# Patient Record
Sex: FEMALE | Race: WHITE | Employment: FULL TIME | URBAN - METROPOLITAN AREA
[De-identification: names, ages, dates, MRNs, and addresses within clinical notes are randomized per-mention and may not be internally consistent; named-entity substitution may affect disease eponyms.]

---

## 2018-07-27 ENCOUNTER — OFFICE VISIT (OUTPATIENT)
Dept: FAMILY MEDICINE CLINIC | Age: 53
End: 2018-07-27
Payer: MEDICARE

## 2018-07-27 VITALS
HEIGHT: 66 IN | SYSTOLIC BLOOD PRESSURE: 132 MMHG | WEIGHT: 158 LBS | TEMPERATURE: 97.7 F | DIASTOLIC BLOOD PRESSURE: 72 MMHG | HEART RATE: 63 BPM | BODY MASS INDEX: 25.39 KG/M2

## 2018-07-27 DIAGNOSIS — J30.89 CHRONIC NON-SEASONAL ALLERGIC RHINITIS, UNSPECIFIED TRIGGER: ICD-10-CM

## 2018-07-27 DIAGNOSIS — Z12.39 BREAST CANCER SCREENING: ICD-10-CM

## 2018-07-27 DIAGNOSIS — Z12.11 SCREENING FOR COLON CANCER: ICD-10-CM

## 2018-07-27 DIAGNOSIS — E04.9 THYROID ENLARGEMENT: ICD-10-CM

## 2018-07-27 DIAGNOSIS — Z11.59 NEED FOR HEPATITIS C SCREENING TEST: ICD-10-CM

## 2018-07-27 DIAGNOSIS — Z97.2 WEARS DENTURES: ICD-10-CM

## 2018-07-27 DIAGNOSIS — Z76.89 ENCOUNTER TO ESTABLISH CARE: ICD-10-CM

## 2018-07-27 DIAGNOSIS — Z80.3 FAMILY HISTORY OF BREAST CANCER IN SISTER: ICD-10-CM

## 2018-07-27 DIAGNOSIS — Z01.89 ROUTINE LAB DRAW: ICD-10-CM

## 2018-07-27 DIAGNOSIS — R53.82 CHRONIC FATIGUE: ICD-10-CM

## 2018-07-27 DIAGNOSIS — L30.9 DERMATITIS: ICD-10-CM

## 2018-07-27 DIAGNOSIS — Z13.220 SCREENING, LIPID: ICD-10-CM

## 2018-07-27 DIAGNOSIS — R23.3 ABNORMAL BRUISING: Primary | ICD-10-CM

## 2018-07-27 DIAGNOSIS — Z11.4 ENCOUNTER FOR SCREENING FOR HIV: ICD-10-CM

## 2018-07-27 DIAGNOSIS — Z13.6 ENCOUNTER FOR SCREENING FOR CARDIOVASCULAR DISORDERS: ICD-10-CM

## 2018-07-27 DIAGNOSIS — Z86.39 HISTORY OF HYPERTHYROIDISM: ICD-10-CM

## 2018-07-27 PROCEDURE — 99203 OFFICE O/P NEW LOW 30 MIN: CPT | Performed by: NURSE PRACTITIONER

## 2018-07-27 PROCEDURE — 3017F COLORECTAL CA SCREEN DOC REV: CPT | Performed by: NURSE PRACTITIONER

## 2018-07-27 PROCEDURE — G8427 DOCREV CUR MEDS BY ELIG CLIN: HCPCS | Performed by: NURSE PRACTITIONER

## 2018-07-27 PROCEDURE — 1036F TOBACCO NON-USER: CPT | Performed by: NURSE PRACTITIONER

## 2018-07-27 PROCEDURE — G8419 CALC BMI OUT NRM PARAM NOF/U: HCPCS | Performed by: NURSE PRACTITIONER

## 2018-07-27 RX ORDER — LORATADINE 10 MG/1
10 TABLET ORAL DAILY
Qty: 30 TABLET | Refills: 0 | COMMUNITY
Start: 2018-07-27 | End: 2018-08-21 | Stop reason: ALTCHOICE

## 2018-07-27 RX ORDER — IBUPROFEN 800 MG/1
800 TABLET ORAL EVERY 8 HOURS PRN
COMMUNITY
Start: 2018-06-11 | End: 2018-08-21 | Stop reason: ALTCHOICE

## 2018-07-27 RX ORDER — ANTIARTHRITIC COMBINATION NO.2 900 MG
1 TABLET ORAL DAILY
COMMUNITY
End: 2018-08-21

## 2018-07-27 RX ORDER — LEVOTHYROXINE SODIUM 0.05 MG/1
50 TABLET ORAL DAILY
COMMUNITY
Start: 2018-07-15 | End: 2018-08-02 | Stop reason: ALTCHOICE

## 2018-07-27 ASSESSMENT — ENCOUNTER SYMPTOMS
COUGH: 0
EYE PAIN: 0
EYE REDNESS: 0
EYE DISCHARGE: 1
SINUS PRESSURE: 0
ABDOMINAL PAIN: 0
TROUBLE SWALLOWING: 0
VOMITING: 0
DIARRHEA: 0
CONSTIPATION: 0
BLOOD IN STOOL: 0
BACK PAIN: 0
SORE THROAT: 0
RHINORRHEA: 1
NAUSEA: 0
CHEST TIGHTNESS: 1
SHORTNESS OF BREATH: 0
ABDOMINAL DISTENTION: 0
WHEEZING: 0

## 2018-07-27 ASSESSMENT — PATIENT HEALTH QUESTIONNAIRE - PHQ9
2. FEELING DOWN, DEPRESSED OR HOPELESS: 0
SUM OF ALL RESPONSES TO PHQ QUESTIONS 1-9: 0
1. LITTLE INTEREST OR PLEASURE IN DOING THINGS: 0
SUM OF ALL RESPONSES TO PHQ9 QUESTIONS 1 & 2: 0

## 2018-07-27 NOTE — PROGRESS NOTES
hepatosplenomegaly. There is no tenderness. There is no rebound and no CVA tenderness. Musculoskeletal: Normal range of motion. She exhibits no edema or tenderness. Lymphadenopathy:     She has no cervical adenopathy. Neurological: She is alert and oriented to person, place, and time. She has normal strength. She exhibits normal muscle tone. Coordination normal.   Skin: Skin is warm, dry and intact. Ecchymosis (large bruises scattered over body wihtout explanation ) and rash noted. No erythema. Psychiatric: Her speech is normal and behavior is normal. Thought content normal. Her mood appears anxious. Nursing note and vitals reviewed. Assessment:      Diagnosis Orders   1. Abnormal bruising  Comprehensive Metabolic Panel    CBC Auto Differential    TSH without Reflex    T4, Free    T3, Free    APTT    Protime-INR    Vitamin B12    Vitamin D 25 Hydroxy   2. Dermatitis  Comprehensive Metabolic Panel    CBC Auto Differential    TSH without Reflex    T4, Free    T3, Free    APTT    Protime-INR    Vitamin B12    Vitamin D 25 Hydroxy   3. Chronic fatigue  Comprehensive Metabolic Panel    CBC Auto Differential    TSH without Reflex    T4, Free    T3, Free    US Head Neck Soft Tissue Thyroid    Vitamin B12    Vitamin D 25 Hydroxy   4. Thyroid enlargement  TSH without Reflex    T4, Free    T3, Free    US Head Neck Soft Tissue Thyroid   5. History of hyperthyroidism  TSH without Reflex    T4, Free    T3, Free    US Head Neck Soft Tissue Thyroid   6. Chronic non-seasonal allergic rhinitis, unspecified trigger  loratadine (CLARITIN) 10 MG tablet   7. Wears dentures     8. Screening for colon cancer  POCT Fecal Immunochemical Test (FIT)   9. Breast cancer screening  [facility] DIGITAL SCREEN W CAD BILATERAL   10. Family history of breast cancer in sister  [facility] DIGITAL SCREEN W CAD BILATERAL   11.  Encounter for screening for cardiovascular disorders  Lipid Panel    Comprehensive Metabolic Panel    CBC Auto Differential 12. Screening, lipid  Lipid Panel    Comprehensive Metabolic Panel    CBC Auto Differential   13. Routine lab draw  Lipid Panel    Comprehensive Metabolic Panel    CBC Auto Differential   14. Encounter for screening for HIV  Comprehensive Metabolic Panel    CBC Auto Differential    TSH without Reflex    T4, Free    T3, Free    US Head Neck Soft Tissue Thyroid    HIV Screen   15. Need for hepatitis C screening test  Hepatitis C Antibody   16. Encounter to establish care         Plan:     Care established in office today  tdap from [address] with FIT test for colon cancer screening   Proceed with mammogram as ordered  Fasting labs today as ordered  Ultrasound thyroid as ordered  Recommend healthy diet and regular exercise   Call with concerns  Return in about 4 weeks (around 8/24/2018) for physical with pap. Abbey Duque received counseling on the following healthy behaviors: nutrition, exercise and medication adherence  Reviewed prior labs and health maintenance  Continue current medications, diet and exercise. Discussed use, benefit, and side effects of prescribed medications. Barriers to medication compliance addressed. Patient given educational materials - see patient instructions  Was a self-tracking handout given in paper form or via "[employer]t? No    Requested Prescriptions     Signed Prescriptions Disp Refills    loratadine (CLARITIN) 10 MG tablet 30 tablet 0     Sig: Take 1 tablet by mouth daily       All patient questions answered. Patient voiced understanding. Quality Measures    Body mass index is 25.5 kg/m². Normal. Weight control planned discussed Healthy diet and regular exercise. BP: 132/72 Blood pressure is normal. Treatment plan consists of No treatment change needed.     Lab Results   Component Value Date    LDLCHOLESTEROL 142 (H) 07/30/2018    (goal LDL reduction with dx if diabetes is 50% LDL reduction)      PHQ Scores 7/27/2018   PHQ2 Score 0   PHQ9 Score 0     Interpretation of Total

## 2018-07-27 NOTE — PATIENT INSTRUCTIONS
Patient Education        Managing Your Allergies: Care Instructions  Your Care Instructions    Managing your allergies is an important part of staying healthy. Your doctor will help you find out what may be causing the allergies. Common causes of allergy symptoms are house dust and dust mites, animal dander, mold, and pollen. As soon as you know what triggers your symptoms, try to reduce your exposure to your triggers. This can help prevent allergy symptoms, asthma, and other health problems. Ask your doctor about allergy medicine or immunotherapy. These treatments may help reduce or prevent allergy symptoms. Follow-up care is a key part of your treatment and safety. Be sure to make and go to all appointments, and call your doctor if you are having problems. It's also a good idea to know your test results and keep a list of the medicines you take. How can you care for yourself at home? · If you think that dust or dust mites are causing your allergies:  ¨ Wash sheets, pillowcases, and other bedding every week in hot water. ¨ Use airtight, dust-proof covers for pillows, duvets, and mattresses. Avoid plastic covers, because they tend to tear quickly and do not \"breathe. \" Wash according to the instructions. ¨ Remove extra blankets and pillows that you don't need. ¨ Use blankets that are machine-washable. ¨ Don't use home humidifiers. They can help mites live longer. · Use air-conditioning. Change or clean all filters every month. Keep windows closed. Use high-efficiency air filters. Don't use window or attic fans, which draw dust into the air. · If you're allergic to pet dander, keep pets outside or, at the very least, out of your bedroom. Old carpet and cloth-covered furniture can hold a lot of animal dander. You may need to replace them. · Look for signs of cockroaches. Use cockroach baits to get rid of them. Then clean your home well.   · If you're allergic to mold, don't keep indoor plants, because your healthcare professional. [address] any warranty or liability for your use of this information. Patient Education        Fatigue: Care Instructions  Your Care Instructions    Fatigue is a feeling of tiredness, exhaustion, or lack of energy. You may feel fatigue because of too much or not enough activity. It can also come from stress, lack of sleep, boredom, and poor diet. Many medical problems, such as viral infections, can cause fatigue. Emotional problems, especially depression, are often the cause of fatigue. Fatigue is most often a symptom of another problem. Treatment for fatigue depends on the cause. For example, if you have fatigue because you have a certain health problem, treating this problem also treats your fatigue. If depression or anxiety is the cause, treatment may help. Follow-up care is a key part of your treatment and safety. Be sure to make and go to all appointments, and call your doctor if you are having problems. It's also a good idea to know your test results and keep a list of the medicines you take. How can you care for yourself at home? · Get regular exercise. But don't overdo it. Go back and forth between rest and exercise. · Get plenty of rest.  · Eat a healthy diet. Do not skip meals, especially breakfast.  · Reduce your use of caffeine, tobacco, and alcohol. Caffeine is most often found in coffee, tea, cola drinks, and chocolate. · Limit medicines that can cause fatigue. This includes tranquilizers and cold and allergy medicines. When should you call for help? Watch closely for changes in your health, and be sure to contact your doctor if:    · You have new symptoms such as fever or a rash.     · Your fatigue gets worse.     · You have been feeling down, depressed, or hopeless. Or you may have lost interest in things that you usually enjoy.     · You are not getting better as expected. Where can you learn more?   Go to days. Try to keep the bruise above the level of your heart. When should you call for help? Call your doctor now or seek immediate medical care if:    · You have signs of infection, such as:  ¨ Increased pain, swelling, warmth, or redness. ¨ Red streaks leading from the bruise. ¨ Pus draining from the bruise. ¨ A fever.     · You have a bruise on your leg and signs of a blood clot, such as:  ¨ Increasing redness and swelling along with warmth, tenderness, and pain in the bruised area. ¨ Pain in your calf, back of the knee, thigh, or groin. ¨ Redness and swelling in your leg or groin.     · Your pain gets worse.    Watch closely for changes in your health, and be sure to contact your doctor if:    · You do not get better as expected. Where can you learn more? Go to https://[email or web address][employer]. org and sign in to your [employer] account. Enter [phone] in the An[employer] DD box to learn more about \"Bruises: Care Instructions. \"     If you do not have an account, please click on the \"Sign Up Now\" link. Current as of: November 20, 2017  Content Version: 11.6  © 5980-4125 [facility]. Care instructions adapted under license by [facility] [employer] [facility] ([facility]). If you have questions about a medical condition or this instruction, always ask your healthcare professional. [place] [address] any warranty or liability for your use of this information.

## 2018-07-27 NOTE — PROGRESS NOTES
Visit Information    Have you changed or started any medications since your last visit including any over-the-counter medicines, vitamins, or herbal medicines? no   Are you having any side effects from any of your medications? -  no  Have you stopped taking any of your medications? Is so, why? -  no    Have you seen any other physician or provider since your last visit? Yes - Records Obtained  Have you had any other diagnostic tests since your last visit? No  Have you been seen in the emergency room and/or had an admission to a hospital since we last saw you? No  Have you had your routine dental cleaning in the past 6 months? no    Have you activated your [employer] account? If not, what are your barriers?  Yes     Patient Care Team:  JAYLENE Leahy CNP as PCP - General (Certified Nurse Practitioner)    Medical History Review  Past Medical, Family, and Social History reviewed and does contribute to the patient presenting condition    Health Maintenance   Topic Date Due    Hepatitis C screen  1965    HIV screen  08/12/1980    DTaP/Tdap/Td vaccine (1 - Tdap) 08/12/1984    Cervical cancer screen  08/12/1986    Lipid screen  08/12/2005    Breast cancer screen  08/12/2015    Shingles Vaccine (1 of 2 - 2 Dose Series) 08/12/2015    Colon cancer screen colonoscopy  08/12/2015    Flu vaccine (1) 09/01/2018

## 2018-07-30 ENCOUNTER — HOSPITAL ENCOUNTER (OUTPATIENT)
Age: 53
Setting detail: SPECIMEN
Discharge: HOME | End: 2018-07-30
Payer: MEDICARE

## 2018-07-30 DIAGNOSIS — D69.6 THROMBOCYTOPENIA (HCC): Primary | ICD-10-CM

## 2018-07-30 DIAGNOSIS — Z13.220 SCREENING, LIPID: ICD-10-CM

## 2018-07-30 DIAGNOSIS — Z01.89 ROUTINE LAB DRAW: ICD-10-CM

## 2018-07-30 DIAGNOSIS — Z11.59 NEED FOR HEPATITIS C SCREENING TEST: ICD-10-CM

## 2018-07-30 DIAGNOSIS — Z11.4 ENCOUNTER FOR SCREENING FOR HIV: ICD-10-CM

## 2018-07-30 DIAGNOSIS — R23.3 ABNORMAL BRUISING: ICD-10-CM

## 2018-07-30 DIAGNOSIS — R53.82 CHRONIC FATIGUE: ICD-10-CM

## 2018-07-30 DIAGNOSIS — L30.9 DERMATITIS: ICD-10-CM

## 2018-07-30 DIAGNOSIS — Z13.6 ENCOUNTER FOR SCREENING FOR CARDIOVASCULAR DISORDERS: ICD-10-CM

## 2018-07-30 DIAGNOSIS — Z86.39 HISTORY OF HYPERTHYROIDISM: ICD-10-CM

## 2018-07-30 DIAGNOSIS — E04.9 THYROID ENLARGEMENT: ICD-10-CM

## 2018-07-30 LAB
ABSOLUTE EOS #: 0.49 K/UL (ref 0–0.44)
ABSOLUTE IMMATURE GRANULOCYTE: <0.03 K/UL (ref 0–0.3)
ABSOLUTE LYMPH #: 1.61 K/UL (ref 1.1–3.7)
ABSOLUTE MONO #: 0.44 K/UL (ref 0.1–1.2)
ALBUMIN SERPL-MCNC: 4.4 G/DL (ref 3.5–5.2)
ALBUMIN/GLOBULIN RATIO: 1.5 (ref 1–2.5)
ALP BLD-CCNC: 82 U/L (ref 35–104)
ALT SERPL-CCNC: 14 U/L (ref 5–33)
ANION GAP SERPL CALCULATED.3IONS-SCNC: 15 MMOL/L (ref 9–17)
AST SERPL-CCNC: 17 U/L
BASOPHILS # BLD: 1 % (ref 0–2)
BASOPHILS ABSOLUTE: 0.07 K/UL (ref 0–0.2)
BILIRUB SERPL-MCNC: 0.66 MG/DL (ref 0.3–1.2)
BUN BLDV-MCNC: 13 MG/DL (ref 6–20)
BUN/CREAT BLD: NORMAL (ref 9–20)
CALCIUM SERPL-MCNC: 9.4 MG/DL (ref 8.6–10.4)
CHLORIDE BLD-SCNC: 104 MMOL/L (ref 98–107)
CHOLESTEROL/HDL RATIO: 3.3
CHOLESTEROL: 230 MG/DL
CO2: 23 MMOL/L (ref 20–31)
CREAT SERPL-MCNC: 0.79 MG/DL (ref 0.5–0.9)
DIFFERENTIAL TYPE: ABNORMAL
EOSINOPHILS RELATIVE PERCENT: 9 % (ref 1–4)
GFR AFRICAN AMERICAN: >60 ML/MIN
GFR NON-AFRICAN AMERICAN: >60 ML/MIN
GFR SERPL CREATININE-BSD FRML MDRD: NORMAL ML/MIN/{1.73_M2}
GFR SERPL CREATININE-BSD FRML MDRD: NORMAL ML/MIN/{1.73_M2}
GLUCOSE BLD-MCNC: 86 MG/DL (ref 70–99)
HCT VFR BLD CALC: 44.8 % (ref 36.3–47.1)
HDLC SERPL-MCNC: 69 MG/DL
HEMOGLOBIN: 14.9 G/DL (ref 11.9–15.1)
HEPATITIS C ANTIBODY: NONREACTIVE
HIV AG/AB: NONREACTIVE
IMMATURE GRANULOCYTES: 0 %
INR BLD: 0.9
LDL CHOLESTEROL: 142 MG/DL (ref 0–130)
LYMPHOCYTES # BLD: 29 % (ref 24–43)
MCH RBC QN AUTO: 30.1 PG (ref 25.2–33.5)
MCHC RBC AUTO-ENTMCNC: 33.3 G/DL (ref 28.4–34.8)
MCV RBC AUTO: 90.5 FL (ref 82.6–102.9)
MONOCYTES # BLD: 8 % (ref 3–12)
NRBC AUTOMATED: 0 PER 100 WBC
PARTIAL THROMBOPLASTIN TIME: 24.9 SEC (ref 20.5–30.5)
PDW BLD-RTO: 13.4 % (ref 11.8–14.4)
PLATELET # BLD: ABNORMAL K/UL (ref 138–453)
PLATELET ESTIMATE: ABNORMAL
PLATELET, FLUORESCENCE: 5 K/UL (ref 138–453)
PLATELET, IMMATURE FRACTION: 38.3 % (ref 1.1–10.3)
PMV BLD AUTO: ABNORMAL FL (ref 8.1–13.5)
POTASSIUM SERPL-SCNC: 4.2 MMOL/L (ref 3.7–5.3)
PROTHROMBIN TIME: 9.7 SEC (ref 9–12)
RBC # BLD: 4.95 M/UL (ref 3.95–5.11)
RBC # BLD: ABNORMAL 10*6/UL
SEG NEUTROPHILS: 53 % (ref 36–65)
SEGMENTED NEUTROPHILS ABSOLUTE COUNT: 2.91 K/UL (ref 1.5–8.1)
SODIUM BLD-SCNC: 142 MMOL/L (ref 135–144)
T3 FREE: 2.74 PG/ML (ref 2.02–4.43)
THYROXINE, FREE: 1.27 NG/DL (ref 0.93–1.7)
TOTAL PROTEIN: 7.4 G/DL (ref 6.4–8.3)
TRIGL SERPL-MCNC: 93 MG/DL
TSH SERPL DL<=0.05 MIU/L-ACNC: 5.19 MIU/L (ref 0.3–5)
VITAMIN B-12: 359 PG/ML (ref 232–1245)
VITAMIN D 25-HYDROXY: 24.1 NG/ML (ref 30–100)
VLDLC SERPL CALC-MCNC: ABNORMAL MG/DL (ref 1–30)
WBC # BLD: 5.5 K/UL (ref 3.5–11.3)
WBC # BLD: ABNORMAL 10*3/UL

## 2018-07-31 ENCOUNTER — TELEPHONE (OUTPATIENT)
Dept: ONCOLOGY | Age: 53
End: 2018-07-31

## 2018-07-31 ENCOUNTER — INITIAL CONSULT (OUTPATIENT)
Dept: ONCOLOGY | Age: 53
End: 2018-07-31
Payer: MEDICARE

## 2018-07-31 VITALS
SYSTOLIC BLOOD PRESSURE: 153 MMHG | HEIGHT: 66 IN | RESPIRATION RATE: 18 BRPM | BODY MASS INDEX: 25.51 KG/M2 | WEIGHT: 158.7 LBS | DIASTOLIC BLOOD PRESSURE: 91 MMHG | HEART RATE: 69 BPM | TEMPERATURE: 97.8 F

## 2018-07-31 DIAGNOSIS — D69.3 ACUTE ITP (HCC): ICD-10-CM

## 2018-07-31 LAB
CONTROL: PRESENT
HEMOCCULT STL QL: NORMAL

## 2018-07-31 PROCEDURE — G8427 DOCREV CUR MEDS BY ELIG CLIN: HCPCS | Performed by: INTERNAL MEDICINE

## 2018-07-31 PROCEDURE — 99245 OFF/OP CONSLTJ NEW/EST HI 55: CPT | Performed by: INTERNAL MEDICINE

## 2018-07-31 PROCEDURE — 3017F COLORECTAL CA SCREEN DOC REV: CPT | Performed by: INTERNAL MEDICINE

## 2018-07-31 PROCEDURE — G8419 CALC BMI OUT NRM PARAM NOF/U: HCPCS | Performed by: INTERNAL MEDICINE

## 2018-07-31 PROCEDURE — 99201 HC NEW PT, E/M LEVEL 1: CPT | Performed by: INTERNAL MEDICINE

## 2018-07-31 RX ORDER — PANTOPRAZOLE SODIUM 40 MG/1
40 TABLET, DELAYED RELEASE ORAL DAILY
Qty: 30 TABLET | Refills: 0 | Status: SHIPPED | OUTPATIENT
Start: 2018-07-31 | End: 2018-08-02 | Stop reason: ALTCHOICE

## 2018-07-31 RX ORDER — DEXAMETHASONE 4 MG/1
40 TABLET ORAL 4 TIMES DAILY
Qty: 40 TABLET | Refills: 0 | Status: SHIPPED | OUTPATIENT
Start: 2018-07-31 | End: 2018-08-04

## 2018-07-31 NOTE — LETTER
Teddy Sullivan MD    2018     Natalie Hartmann, APRN - CNP   [address], [address] [facility] [address]    Dear Doctors : Thank you for referring Stan Gary, 1965, to me for evaluation. Below are the relevant portions of my assessment and plan of care. DIAGNOSIS:   1. Severe thrombocytopenia, likely ITP with petechia and bruising, no active bleeding     CURRENT THERAPY:  1. Start steroids course 2018    BRIEF CASE HISTORY:  Stan Gary is a very pleasant 46 y.o. female who is referred to us for thrombocytopenia. She recently had CBC via PCP which showed PLT count at 5. She has no other history of lab work. She notes she has petechia ecchymosis which started about a year ago and has been bruising easily over the last weeks. She was on steroids for shoulder dislocation a few moths ago and had GI distress. She reports the bruising started after that. She had hypothyroidism and takes Synthroid. She has not started new medication recently and does not take any blood thinners or pain medication. She has no history of stroke, heart attack, previous blood disorders, diabetes, or cancer. She does not drink alcohol or smoke cigarettes. PAST MEDICAL HISTORY: has a past medical history of Atrial fibrillation ([address] Hyperthyroidism; and Wears dentures. PAST SURGICAL HISTORY: has a past surgical history that includes  section (). CURRENT MEDICATIONS:  has a current medication list which includes the following prescription(s): levothyroxine, ibu, biotin, diphenhydramine hcl, and loratadine. ALLERGIES:  is allergic to bee venom and nuts [peanut-containing drug products]. FAMILY HISTORY: Negative for any hematological or oncological conditions. SOCIAL HISTORY:  reports that she has never smoked. She has never used smokeless tobacco. She reports that she drinks alcohol. She reports that she does not use drugs.     REVIEW OF SYSTEMS: Chemistry        Component Value Date/Time     07/30/2018 0735    K 4.2 07/30/2018 0735     07/30/2018 0735    CO2 23 07/30/2018 0735    BUN 13 07/30/2018 0735    CREATININE 0.79 07/30/2018 0735        Component Value Date/Time    CALCIUM 9.4 07/30/2018 0735    ALKPHOS 82 07/30/2018 0735    AST 17 07/30/2018 0735    ALT 14 07/30/2018 0735    BILITOT 0.66 07/30/2018 0735        Platelet, Immature Fraction 38.3   1.1 - 10.3 % Final 07/30/2018  7:35  [facility]   Platelet, Fluorescence 5   138 - 453 k/uL Final       HIV and hepatitis tests were negative    REVIEW OF RADIOLOGICAL RESULTS:     IMPRESSION:   1. Severe thrombocytopenia, likely  ITP with petechia and bruising    PLAN:   1. We reviewed her recent history, she has symptoms of petechia and bruising in addition to very low PLT. 2. I explained the differential diagnosis, including the likely diagnosis of ITP  3. I offered the patient admission to the hospital but she declined  4. I discussed plan to start steroids course and repeat testing in 2 days, I recommend she take prilosec during the course. 5. I explained she will need bone marrow biopsy in the near future. 6. I am writing for steroids. 7. I am ordering repeat lab work. 8. Return in 2 days. If you have questions, please do not hesitate to call me. I look forward to following [facility] along with you.     Sincerely,    Yudelka Jerez MD  Hematology/ Oncology  Cell [phone]

## 2018-08-01 DIAGNOSIS — Z12.11 SCREENING FOR COLON CANCER: ICD-10-CM

## 2018-08-01 PROCEDURE — 82274 ASSAY TEST FOR BLOOD FECAL: CPT | Performed by: NURSE PRACTITIONER

## 2018-08-02 ENCOUNTER — HOSPITAL ENCOUNTER (OUTPATIENT)
Age: 53
Discharge: HOME | End: 2018-08-02
Payer: MEDICARE

## 2018-08-02 ENCOUNTER — TELEPHONE (OUTPATIENT)
Dept: INFUSION THERAPY | Age: 53
End: 2018-08-02

## 2018-08-02 ENCOUNTER — OFFICE VISIT (OUTPATIENT)
Dept: ONCOLOGY | Age: 53
End: 2018-08-02
Payer: MEDICARE

## 2018-08-02 ENCOUNTER — TELEPHONE (OUTPATIENT)
Dept: ONCOLOGY | Age: 53
End: 2018-08-02

## 2018-08-02 VITALS
WEIGHT: 158 LBS | TEMPERATURE: 97.8 F | BODY MASS INDEX: 25.89 KG/M2 | SYSTOLIC BLOOD PRESSURE: 170 MMHG | DIASTOLIC BLOOD PRESSURE: 94 MMHG | HEART RATE: 60 BPM

## 2018-08-02 DIAGNOSIS — R23.3 ABNORMAL BRUISING: ICD-10-CM

## 2018-08-02 DIAGNOSIS — D69.3 ACUTE ITP (HCC): ICD-10-CM

## 2018-08-02 LAB
ABSOLUTE EOS #: 0 K/UL (ref 0–0.4)
ABSOLUTE IMMATURE GRANULOCYTE: ABNORMAL K/UL (ref 0–0.3)
ABSOLUTE LYMPH #: 0.92 K/UL (ref 1–4.8)
ABSOLUTE MONO #: 1.07 K/UL (ref 0.1–1.2)
BASOPHILS # BLD: 0 % (ref 0–2)
BASOPHILS ABSOLUTE: 0 K/UL (ref 0–0.2)
DIFFERENTIAL TYPE: ABNORMAL
EOSINOPHILS RELATIVE PERCENT: 0 % (ref 1–4)
HCT VFR BLD CALC: 43.8 % (ref 36–46)
HEMOGLOBIN: 15 G/DL (ref 12–16)
IMMATURE GRANULOCYTES: ABNORMAL %
LYMPHOCYTES # BLD: 6 % (ref 24–44)
MCH RBC QN AUTO: 31 PG (ref 26–34)
MCHC RBC AUTO-ENTMCNC: 34.2 G/DL (ref 31–37)
MCV RBC AUTO: 90.8 FL (ref 80–100)
MONOCYTES # BLD: 7 % (ref 2–11)
MORPHOLOGY: NORMAL
NRBC AUTOMATED: ABNORMAL PER 100 WBC
PDW BLD-RTO: 13.9 % (ref 12.5–15.4)
PLATELET # BLD: 19 K/UL (ref 140–450)
PLATELET ESTIMATE: ABNORMAL
PMV BLD AUTO: 10.1 FL (ref 6–12)
RBC # BLD: 4.82 M/UL (ref 4–5.2)
RBC # BLD: ABNORMAL 10*6/UL
SEG NEUTROPHILS: 87 % (ref 36–66)
SEGMENTED NEUTROPHILS ABSOLUTE COUNT: 13.31 K/UL (ref 1.8–7.7)
WBC # BLD: 15.3 K/UL (ref 3.5–11)
WBC # BLD: ABNORMAL 10*3/UL

## 2018-08-02 PROCEDURE — 36415 COLL VENOUS BLD VENIPUNCTURE: CPT

## 2018-08-02 PROCEDURE — 3017F COLORECTAL CA SCREEN DOC REV: CPT | Performed by: INTERNAL MEDICINE

## 2018-08-02 PROCEDURE — 1036F TOBACCO NON-USER: CPT | Performed by: INTERNAL MEDICINE

## 2018-08-02 PROCEDURE — G8427 DOCREV CUR MEDS BY ELIG CLIN: HCPCS | Performed by: INTERNAL MEDICINE

## 2018-08-02 PROCEDURE — 85025 COMPLETE CBC W/AUTO DIFF WBC: CPT

## 2018-08-02 PROCEDURE — 99214 OFFICE O/P EST MOD 30 MIN: CPT | Performed by: INTERNAL MEDICINE

## 2018-08-02 PROCEDURE — G8419 CALC BMI OUT NRM PARAM NOF/U: HCPCS | Performed by: INTERNAL MEDICINE

## 2018-08-02 PROCEDURE — 99211 OFF/OP EST MAY X REQ PHY/QHP: CPT | Performed by: INTERNAL MEDICINE

## 2018-08-02 RX ORDER — OMEPRAZOLE 20 MG/1
20 CAPSULE, DELAYED RELEASE ORAL DAILY
COMMUNITY
End: 2018-08-21 | Stop reason: ALTCHOICE

## 2018-08-02 NOTE — LETTER
Darinel Falk MD    2018     Phil Pandya, APRN - CNP   [address] [facility], [address] [facility]    Dear Doctors : Thank you for referring Ceci Gray, 1965, to me for evaluation. Below are the relevant portions of my assessment and plan of care. DIAGNOSIS:   1. Severe thrombocytopenia, likely ITP with petechia and bruising, no active bleeding     CURRENT THERAPY:  1. Start steroids course (high dose dex)  2018 - good initial response    BRIEF CASE HISTORY:  Ceci Gray is a very pleasant 46 y.o. female who is referred to us for thrombocytopenia. She recently had CBC via PCP which showed PLT count at 5. She has no other history of lab work. She notes she has petechia ecchymosis which started about a year ago and has been bruising easily over the last weeks. She was on steroids for shoulder dislocation a few moths ago and had GI distress. She reports the bruising started after that. She had hypothyroidism and takes Synthroid. She has not started new medication recently and does not take any blood thinners or pain medication. She has no history of stroke, heart attack, previous blood disorders, diabetes, or cancer. She does not drink alcohol or smoke cigarettes. Started on steroids 2018 - good initial response. INTERIM HISTORY: The patient presents for follow up. She has been taking steroids and notes fatigue and bone pain. PAST MEDICAL HISTORY: has a past medical history of Atrial fibrillation ([address] Hyperthyroidism; and Wears dentures. PAST SURGICAL HISTORY: has a past surgical history that includes  section (). CURRENT MEDICATIONS:  has a current medication list which includes the following prescription(s): omeprazole, dexamethasone, ibu, biotin, diphenhydramine hcl, and loratadine. ALLERGIES:  is allergic to bee venom and nuts [peanut-containing drug products]. appreciated in the supraclavicular, axillary, cervical or inguinal area Skin: petechia and bruising    REVIEW OF LABORATORY DATA:   Lab Results   Component Value Date    WBC 15.3 (H) 08/02/2018    HGB 15.0 08/02/2018    HCT 43.8 08/02/2018    MCV 90.8 08/02/2018    PLT 19 (LL) 08/02/2018           Chemistry        Component Value Date/Time     07/30/2018 0735    K 4.2 07/30/2018 0735     07/30/2018 0735    CO2 23 07/30/2018 0735    BUN 13 07/30/2018 0735    CREATININE 0.79 07/30/2018 0735        Component Value Date/Time    CALCIUM 9.4 07/30/2018 0735    ALKPHOS 82 07/30/2018 0735    AST 17 07/30/2018 0735    ALT 14 07/30/2018 0735    BILITOT 0.66 07/30/2018 0735        Platelet, Immature Fraction 38.3   1.1 - 10.3 % Final 07/30/2018  7:35  [facility] [facility]   Platelet, Fluorescence 5   138 - 453 k/uL Final       HIV and hepatitis tests were negative    REVIEW OF RADIOLOGICAL RESULTS:     IMPRESSION:   1. Severe thrombocytopenia, likely  ITP with petechia and bruising  2. Started 4 day course of steroids - good initial response. PLAN:   1. We reviewed her current PLT count which shows good response to steroids. 2. I discussed plan to complete course and monitor lab work weekly with goal of PLT reaching minimum of 50 and remaining stable. 3. I explained her bone pain is side effect of steroids. 4. I am ordering weekly lab work. 5. Return in 2 weeks. If you have questions, please do not hesitate to call me. I look forward to following Tyrell Epley along with you.     Sincerely,    Yomi Jackson MD  Hematology/ Oncology  Cell [phone]

## 2018-08-02 NOTE — TELEPHONE ENCOUNTER
TWO WEEK FOLLOW UP SCHEDULED FOR 8/16/18 AT 2:20PM WITH CBC TO BE DRAWN AT [facility] PATIENT WILL COME IN ON 8/9/18 TO HAVE CBC DRAWN FOR WEEKLY DRAW.

## 2018-08-02 NOTE — TELEPHONE ENCOUNTER
A message was left on our voicemail from lab, regarding a low plt count. I called lab spoke to [facility]. She gave me to someone else. While I was waiting it was noted that Dr. Stephany Mohan saw the patient today. He saw the lab results today and he was pleased with the increase in plts. D/t steroid treatment. It was noted by lab that they spoke to Vinnie  [address]. earlier today, after leaving the message on the triage line.

## 2018-08-05 PROBLEM — E04.9 THYROID ENLARGEMENT: Status: ACTIVE | Noted: 2018-08-05

## 2018-08-07 ENCOUNTER — TELEPHONE (OUTPATIENT)
Dept: ONCOLOGY | Age: 53
End: 2018-08-07

## 2018-08-09 ENCOUNTER — TELEPHONE (OUTPATIENT)
Dept: ONCOLOGY | Age: 53
End: 2018-08-09

## 2018-08-09 ENCOUNTER — TELEPHONE (OUTPATIENT)
Dept: INFUSION THERAPY | Age: 53
End: 2018-08-09

## 2018-08-09 ENCOUNTER — HOSPITAL ENCOUNTER (OUTPATIENT)
Age: 53
Discharge: HOME | End: 2018-08-09
Payer: MEDICARE

## 2018-08-09 DIAGNOSIS — D69.3 ACUTE ITP (HCC): ICD-10-CM

## 2018-08-09 DIAGNOSIS — D69.3 ACUTE ITP (HCC): Primary | ICD-10-CM

## 2018-08-09 LAB
ABSOLUTE EOS #: 0.43 K/UL (ref 0–0.4)
ABSOLUTE IMMATURE GRANULOCYTE: ABNORMAL K/UL (ref 0–0.3)
ABSOLUTE LYMPH #: 1.59 K/UL (ref 1–4.8)
ABSOLUTE MONO #: 0.43 K/UL (ref 0.1–0.8)
BASOPHILS # BLD: 0 % (ref 0–2)
BASOPHILS ABSOLUTE: 0 K/UL (ref 0–0.2)
DIFFERENTIAL TYPE: ABNORMAL
EOSINOPHILS RELATIVE PERCENT: 7 % (ref 1–4)
HCT VFR BLD CALC: 44.1 % (ref 36–46)
HEMOGLOBIN: 15.3 G/DL (ref 12–16)
IMMATURE GRANULOCYTES: ABNORMAL %
LYMPHOCYTES # BLD: 26 % (ref 24–44)
MCH RBC QN AUTO: 31.7 PG (ref 26–34)
MCHC RBC AUTO-ENTMCNC: 34.7 G/DL (ref 31–37)
MCV RBC AUTO: 91.3 FL (ref 80–100)
MONOCYTES # BLD: 7 % (ref 1–7)
MORPHOLOGY: NORMAL
NRBC AUTOMATED: ABNORMAL PER 100 WBC
PDW BLD-RTO: 13.9 % (ref 12.5–15.4)
PLATELET # BLD: 15 K/UL (ref 140–450)
PLATELET ESTIMATE: ABNORMAL
PMV BLD AUTO: 10.2 FL (ref 6–12)
RBC # BLD: 4.83 M/UL (ref 4–5.2)
RBC # BLD: ABNORMAL 10*6/UL
SEG NEUTROPHILS: 60 % (ref 36–66)
SEGMENTED NEUTROPHILS ABSOLUTE COUNT: 3.65 K/UL (ref 1.8–7.7)
WBC # BLD: 6.1 K/UL (ref 3.5–11)
WBC # BLD: ABNORMAL 10*3/UL

## 2018-08-09 PROCEDURE — 85025 COMPLETE CBC W/AUTO DIFF WBC: CPT

## 2018-08-09 PROCEDURE — 36415 COLL VENOUS BLD VENIPUNCTURE: CPT

## 2018-08-09 RX ORDER — DIPHENHYDRAMINE HYDROCHLORIDE 50 MG/ML
25 INJECTION INTRAMUSCULAR; INTRAVENOUS ONCE
Status: CANCELLED | OUTPATIENT
Start: 2018-08-10 | End: 2018-08-10

## 2018-08-09 RX ORDER — METHYLPREDNISOLONE SODIUM SUCCINATE 125 MG/2ML
125 INJECTION, POWDER, LYOPHILIZED, FOR SOLUTION INTRAMUSCULAR; INTRAVENOUS ONCE
Status: CANCELLED | OUTPATIENT
Start: 2018-08-10 | End: 2018-08-10

## 2018-08-09 RX ORDER — DIPHENHYDRAMINE HYDROCHLORIDE 50 MG/ML
50 INJECTION INTRAMUSCULAR; INTRAVENOUS ONCE
Status: CANCELLED | OUTPATIENT
Start: 2018-08-10 | End: 2018-08-10

## 2018-08-09 RX ORDER — SODIUM CHLORIDE 0.9 % (FLUSH) 0.9 %
5 SYRINGE (ML) INJECTION PRN
Status: CANCELLED | OUTPATIENT
Start: 2018-08-10

## 2018-08-09 RX ORDER — SODIUM CHLORIDE 9 MG/ML
INJECTION, SOLUTION INTRAVENOUS CONTINUOUS
Status: CANCELLED | OUTPATIENT
Start: 2018-08-10

## 2018-08-09 RX ORDER — HEPARIN SODIUM (PORCINE) LOCK FLUSH IV SOLN 100 UNIT/ML 100 UNIT/ML
500 SOLUTION INTRAVENOUS PRN
Status: CANCELLED | OUTPATIENT
Start: 2018-08-10

## 2018-08-09 RX ORDER — ACETAMINOPHEN 325 MG/1
650 TABLET ORAL ONCE
Status: CANCELLED | OUTPATIENT
Start: 2018-08-10

## 2018-08-09 RX ORDER — 0.9 % SODIUM CHLORIDE 0.9 %
10 VIAL (ML) INJECTION ONCE
Status: CANCELLED | OUTPATIENT
Start: 2018-08-10 | End: 2018-08-10

## 2018-08-09 RX ORDER — PREDNISONE 20 MG/1
40 TABLET ORAL DAILY
Qty: 60 TABLET | Refills: 0 | Status: SHIPPED | OUTPATIENT
Start: 2018-08-09 | End: 2018-09-06 | Stop reason: SDUPTHER

## 2018-08-09 RX ORDER — SODIUM CHLORIDE 0.9 % (FLUSH) 0.9 %
10 SYRINGE (ML) INJECTION PRN
Status: CANCELLED | OUTPATIENT
Start: 2018-08-10

## 2018-08-09 RX ORDER — SODIUM CHLORIDE 9 MG/ML
INJECTION, SOLUTION INTRAVENOUS ONCE
Status: CANCELLED | OUTPATIENT
Start: 2018-08-10

## 2018-08-09 RX ORDER — EPINEPHRINE 1 MG/ML
0.3 INJECTION, SOLUTION, CONCENTRATE INTRAVENOUS PRN
Status: CANCELLED | OUTPATIENT
Start: 2018-08-10

## 2018-08-09 NOTE — TELEPHONE ENCOUNTER
Patient provided with cbc results for today. Patient's platelet count is critical at 15. Patient denies any active bleeding, dark/tarry stools, or any other issues. Patient stated she still has generalized bruising; however, nothing has worsened. Writer contacted Dr Lazarus Reader via [employer] and he stated he spoke with Sunny Teran infusion RN, and gave her orders.  Summer Robbins

## 2018-08-13 ENCOUNTER — TELEPHONE (OUTPATIENT)
Dept: ONCOLOGY | Age: 53
End: 2018-08-13

## 2018-08-13 ENCOUNTER — HOSPITAL ENCOUNTER (OUTPATIENT)
Age: 53
Discharge: HOME | End: 2018-08-13
Payer: MEDICARE

## 2018-08-13 DIAGNOSIS — D69.3 ACUTE ITP (HCC): ICD-10-CM

## 2018-08-13 LAB
ABSOLUTE EOS #: 0.27 K/UL (ref 0–0.4)
ABSOLUTE IMMATURE GRANULOCYTE: ABNORMAL K/UL (ref 0–0.3)
ABSOLUTE LYMPH #: 1.96 K/UL (ref 1–4.8)
ABSOLUTE MONO #: 0.53 K/UL (ref 0.1–0.8)
BASOPHILS # BLD: 0 % (ref 0–2)
BASOPHILS ABSOLUTE: 0 K/UL (ref 0–0.2)
DIFFERENTIAL TYPE: ABNORMAL
EOSINOPHILS RELATIVE PERCENT: 3 % (ref 1–4)
HCT VFR BLD CALC: 42.2 % (ref 36–46)
HEMOGLOBIN: 14.5 G/DL (ref 12–16)
IMMATURE GRANULOCYTES: ABNORMAL %
LYMPHOCYTES # BLD: 22 % (ref 24–44)
MCH RBC QN AUTO: 31.5 PG (ref 26–34)
MCHC RBC AUTO-ENTMCNC: 34.3 G/DL (ref 31–37)
MCV RBC AUTO: 91.8 FL (ref 80–100)
MONOCYTES # BLD: 6 % (ref 1–7)
MORPHOLOGY: NORMAL
NRBC AUTOMATED: ABNORMAL PER 100 WBC
PDW BLD-RTO: 14.2 % (ref 12.5–15.4)
PLATELET # BLD: 33 K/UL (ref 140–450)
PLATELET ESTIMATE: ABNORMAL
PMV BLD AUTO: 9.9 FL (ref 6–12)
RBC # BLD: 4.6 M/UL (ref 4–5.2)
RBC # BLD: ABNORMAL 10*6/UL
SEG NEUTROPHILS: 69 % (ref 36–66)
SEGMENTED NEUTROPHILS ABSOLUTE COUNT: 6.14 K/UL (ref 1.8–7.7)
WBC # BLD: 8.9 K/UL (ref 3.5–11)
WBC # BLD: ABNORMAL 10*3/UL

## 2018-08-13 PROCEDURE — 85025 COMPLETE CBC W/AUTO DIFF WBC: CPT

## 2018-08-13 PROCEDURE — 36415 COLL VENOUS BLD VENIPUNCTURE: CPT

## 2018-08-16 ENCOUNTER — TELEPHONE (OUTPATIENT)
Dept: ONCOLOGY | Age: 53
End: 2018-08-16

## 2018-08-16 ENCOUNTER — HOSPITAL ENCOUNTER (OUTPATIENT)
Age: 53
Discharge: HOME | End: 2018-08-16
Payer: MEDICARE

## 2018-08-16 DIAGNOSIS — D69.3 ACUTE ITP (HCC): ICD-10-CM

## 2018-08-16 LAB
ABSOLUTE EOS #: 0.1 K/UL (ref 0–0.4)
ABSOLUTE IMMATURE GRANULOCYTE: ABNORMAL K/UL (ref 0–0.3)
ABSOLUTE LYMPH #: 3.1 K/UL (ref 1–4.8)
ABSOLUTE MONO #: 0.7 K/UL (ref 0.1–1.2)
BASOPHILS # BLD: 1 % (ref 0–2)
BASOPHILS ABSOLUTE: 0.1 K/UL (ref 0–0.2)
DIFFERENTIAL TYPE: ABNORMAL
EOSINOPHILS RELATIVE PERCENT: 1 % (ref 1–4)
HCT VFR BLD CALC: 45.3 % (ref 36–46)
HEMOGLOBIN: 15.3 G/DL (ref 12–16)
IMMATURE GRANULOCYTES: ABNORMAL %
LYMPHOCYTES # BLD: 31 % (ref 24–44)
MCH RBC QN AUTO: 31.3 PG (ref 26–34)
MCHC RBC AUTO-ENTMCNC: 33.9 G/DL (ref 31–37)
MCV RBC AUTO: 92.4 FL (ref 80–100)
MONOCYTES # BLD: 7 % (ref 2–11)
NRBC AUTOMATED: ABNORMAL PER 100 WBC
PDW BLD-RTO: 14.5 % (ref 12.5–15.4)
PLATELET # BLD: 26 K/UL (ref 140–450)
PLATELET ESTIMATE: ABNORMAL
PMV BLD AUTO: 10 FL (ref 6–12)
RBC # BLD: 4.91 M/UL (ref 4–5.2)
RBC # BLD: ABNORMAL 10*6/UL
SEG NEUTROPHILS: 60 % (ref 36–66)
SEGMENTED NEUTROPHILS ABSOLUTE COUNT: 6.2 K/UL (ref 1.8–7.7)
WBC # BLD: 10.2 K/UL (ref 3.5–11)
WBC # BLD: ABNORMAL 10*3/UL

## 2018-08-16 PROCEDURE — 36415 COLL VENOUS BLD VENIPUNCTURE: CPT

## 2018-08-16 PROCEDURE — 85025 COMPLETE CBC W/AUTO DIFF WBC: CPT

## 2018-08-16 NOTE — TELEPHONE ENCOUNTER
Writer spoke with patient regarding critical platelet count. Patient denies any active bleeding at this time. Writer discussed platelet count with Dr Angelique Gandhi and he stated he wants patient's Rituxan to be started ASAP. Writer called and discussed above with Anu Robles, infusion . Logan Eaton stated she will discuss adding patient to schedule for tomorrow or next week. Anu Robles to f/u with patient to schedule Rituxan.  Marta Gonzalez

## 2018-08-17 ENCOUNTER — APPOINTMENT (OUTPATIENT)
Dept: INFUSION THERAPY | Age: 53
End: 2018-08-17
Payer: MEDICARE

## 2018-08-20 ENCOUNTER — TELEPHONE (OUTPATIENT)
Dept: ONCOLOGY | Age: 53
End: 2018-08-20

## 2018-08-20 ENCOUNTER — HOSPITAL ENCOUNTER (OUTPATIENT)
Age: 53
Discharge: HOME | End: 2018-08-20
Payer: MEDICARE

## 2018-08-20 DIAGNOSIS — D69.3 ACUTE ITP (HCC): ICD-10-CM

## 2018-08-20 LAB
ABSOLUTE EOS #: 0.1 K/UL (ref 0–0.4)
ABSOLUTE IMMATURE GRANULOCYTE: ABNORMAL K/UL (ref 0–0.3)
ABSOLUTE LYMPH #: 3.3 K/UL (ref 1–4.8)
ABSOLUTE MONO #: 0.8 K/UL (ref 0.1–1.2)
BASOPHILS # BLD: 1 % (ref 0–2)
BASOPHILS ABSOLUTE: 0.1 K/UL (ref 0–0.2)
DIFFERENTIAL TYPE: ABNORMAL
EOSINOPHILS RELATIVE PERCENT: 1 % (ref 1–4)
HCT VFR BLD CALC: 43.7 % (ref 36–46)
HEMOGLOBIN: 14.8 G/DL (ref 12–16)
IMMATURE GRANULOCYTES: ABNORMAL %
LYMPHOCYTES # BLD: 35 % (ref 24–44)
MCH RBC QN AUTO: 31.1 PG (ref 26–34)
MCHC RBC AUTO-ENTMCNC: 33.9 G/DL (ref 31–37)
MCV RBC AUTO: 91.8 FL (ref 80–100)
MONOCYTES # BLD: 8 % (ref 2–11)
NRBC AUTOMATED: ABNORMAL PER 100 WBC
PDW BLD-RTO: 14.3 % (ref 12.5–15.4)
PLATELET # BLD: 48 K/UL (ref 140–450)
PLATELET ESTIMATE: ABNORMAL
PMV BLD AUTO: 10.1 FL (ref 6–12)
RBC # BLD: 4.76 M/UL (ref 4–5.2)
RBC # BLD: ABNORMAL 10*6/UL
SEG NEUTROPHILS: 55 % (ref 36–66)
SEGMENTED NEUTROPHILS ABSOLUTE COUNT: 5.1 K/UL (ref 1.8–7.7)
WBC # BLD: 9.3 K/UL (ref 3.5–11)
WBC # BLD: ABNORMAL 10*3/UL

## 2018-08-20 PROCEDURE — 36415 COLL VENOUS BLD VENIPUNCTURE: CPT

## 2018-08-20 PROCEDURE — 85025 COMPLETE CBC W/AUTO DIFF WBC: CPT

## 2018-08-21 ENCOUNTER — OFFICE VISIT (OUTPATIENT)
Dept: ONCOLOGY | Age: 53
End: 2018-08-21
Payer: MEDICARE

## 2018-08-21 VITALS
WEIGHT: 159.56 LBS | SYSTOLIC BLOOD PRESSURE: 157 MMHG | BODY MASS INDEX: 26.15 KG/M2 | HEART RATE: 82 BPM | DIASTOLIC BLOOD PRESSURE: 92 MMHG | TEMPERATURE: 97.8 F

## 2018-08-21 DIAGNOSIS — R23.3 ABNORMAL BRUISING: ICD-10-CM

## 2018-08-21 PROCEDURE — G8427 DOCREV CUR MEDS BY ELIG CLIN: HCPCS | Performed by: INTERNAL MEDICINE

## 2018-08-21 PROCEDURE — 1036F TOBACCO NON-USER: CPT | Performed by: INTERNAL MEDICINE

## 2018-08-21 PROCEDURE — 99215 OFFICE O/P EST HI 40 MIN: CPT | Performed by: INTERNAL MEDICINE

## 2018-08-21 PROCEDURE — 99211 OFF/OP EST MAY X REQ PHY/QHP: CPT | Performed by: INTERNAL MEDICINE

## 2018-08-21 PROCEDURE — 3017F COLORECTAL CA SCREEN DOC REV: CPT | Performed by: INTERNAL MEDICINE

## 2018-08-21 PROCEDURE — G8419 CALC BMI OUT NRM PARAM NOF/U: HCPCS | Performed by: INTERNAL MEDICINE

## 2018-08-21 RX ORDER — PANTOPRAZOLE SODIUM 40 MG/1
40 GRANULE, DELAYED RELEASE ORAL
COMMUNITY
End: 2018-09-06 | Stop reason: SDUPTHER

## 2018-08-21 RX ORDER — LEVOTHYROXINE SODIUM 0.05 MG/1
50 TABLET ORAL DAILY
COMMUNITY
End: 2019-02-18 | Stop reason: SDUPTHER

## 2018-08-21 NOTE — PROGRESS NOTES
she drinks alcohol. She reports that she does not use drugs. REVIEW OF SYSTEMS:   General: no fever or night sweats, Weight is stable. Fatigue   ENT: No double or blurred vision, no tinnitus or hearing problem, no dysphagia or sore throat   Respiratory: No chest pain, no shortness of breath, no cough or hemoptysis. Cardiovascular: Denies chest pain, PND or orthopnea. No L E swelling or palpitations. Gastrointestinal:  No nausea or vomiting, abdominal pain, diarrhea or constipation. Genitourinary: Denies dysuria, hematuria, frequency, urgency or incontinence. Neurological: Denies headaches, decreased LOC, no sensory or motor focal deficits. Musculoskeletal:  No arthralgia no back pain or joint swelling. Bone pain, side effect   Skin: There are no rashes or bleeding. Petechia ecchymosis    Psychiatric:  No anxiety, no depression. Endocrine: No diabetes or thyroid disease. Hematologic: No bleeding, no adenopathy. PHYSICAL EXAM: Shows a well appearing 48y.o.-year-old female who is not in pain or distress. Vital Signs: Blood pressure (!) 157/92, pulse 82, temperature 97.8 °F (36.6 °C), weight 159 lb 9 oz (72.4 kg), not currently breastfeeding. HEENT: Normocephalic and atraumatic. Pupils are equal, round, reactive to light and accommodation. Extraocular muscles are intact. Neck: Showed no JVD, no carotid bruit . Lungs: Clear to auscultation bilaterally. Heart: Regular without any murmur. Abdomen: Soft, nontender. No hepatosplenomegaly. Extremities: Lower extremities show no edema, clubbing, or cyanosis. Breasts: Examination not done today.  Neuro exam: intact cranial nerves bilaterally no motor or sensory deficit, gait is normal. Lymphatic: no adenopathy appreciated in the supraclavicular, axillary, cervical or inguinal area Skin: petechia and bruising    REVIEW OF LABORATORY DATA:   Lab Results   Component Value Date    WBC 9.3 08/20/2018    HGB 14.8 08/20/2018    HCT 43.7 08/20/2018    MCV 91.8

## 2018-08-22 DIAGNOSIS — D69.3 ACUTE ITP (HCC): Primary | ICD-10-CM

## 2018-08-22 RX ORDER — DIPHENHYDRAMINE HYDROCHLORIDE 50 MG/ML
50 INJECTION INTRAMUSCULAR; INTRAVENOUS ONCE
Status: CANCELLED | OUTPATIENT
Start: 2018-08-22 | End: 2018-08-22

## 2018-08-22 RX ORDER — SODIUM CHLORIDE 0.9 % (FLUSH) 0.9 %
10 SYRINGE (ML) INJECTION PRN
Status: CANCELLED | OUTPATIENT
Start: 2018-08-22

## 2018-08-22 RX ORDER — EPINEPHRINE 1 MG/ML
0.3 INJECTION, SOLUTION, CONCENTRATE INTRAVENOUS PRN
Status: CANCELLED | OUTPATIENT
Start: 2018-08-22

## 2018-08-22 RX ORDER — SODIUM CHLORIDE 9 MG/ML
INJECTION, SOLUTION INTRAVENOUS CONTINUOUS
Status: CANCELLED | OUTPATIENT
Start: 2018-08-22

## 2018-08-22 RX ORDER — SODIUM CHLORIDE 0.9 % (FLUSH) 0.9 %
5 SYRINGE (ML) INJECTION PRN
Status: CANCELLED | OUTPATIENT
Start: 2018-08-22

## 2018-08-22 RX ORDER — SODIUM CHLORIDE 9 MG/ML
INJECTION, SOLUTION INTRAVENOUS ONCE
Status: CANCELLED | OUTPATIENT
Start: 2018-08-22

## 2018-08-22 RX ORDER — 0.9 % SODIUM CHLORIDE 0.9 %
10 VIAL (ML) INJECTION ONCE
Status: CANCELLED | OUTPATIENT
Start: 2018-08-22 | End: 2018-08-22

## 2018-08-22 RX ORDER — ACETAMINOPHEN 325 MG/1
650 TABLET ORAL ONCE
Status: COMPLETED | OUTPATIENT
Start: 2018-08-23 | End: 2018-08-23

## 2018-08-22 RX ORDER — ACETAMINOPHEN 325 MG/1
650 TABLET ORAL ONCE
Status: CANCELLED | OUTPATIENT
Start: 2018-08-22

## 2018-08-22 RX ORDER — HEPARIN SODIUM (PORCINE) LOCK FLUSH IV SOLN 100 UNIT/ML 100 UNIT/ML
500 SOLUTION INTRAVENOUS PRN
Status: CANCELLED | OUTPATIENT
Start: 2018-08-22

## 2018-08-22 RX ORDER — DIPHENHYDRAMINE HYDROCHLORIDE 50 MG/ML
25 INJECTION INTRAMUSCULAR; INTRAVENOUS ONCE
Status: COMPLETED | OUTPATIENT
Start: 2018-08-22 | End: 2018-08-23

## 2018-08-22 RX ORDER — DIPHENHYDRAMINE HYDROCHLORIDE 50 MG/ML
25 INJECTION INTRAMUSCULAR; INTRAVENOUS ONCE
Status: CANCELLED | OUTPATIENT
Start: 2018-08-22 | End: 2018-08-22

## 2018-08-22 RX ORDER — METHYLPREDNISOLONE SODIUM SUCCINATE 125 MG/2ML
125 INJECTION, POWDER, LYOPHILIZED, FOR SOLUTION INTRAMUSCULAR; INTRAVENOUS ONCE
Status: CANCELLED | OUTPATIENT
Start: 2018-08-22 | End: 2018-08-22

## 2018-08-23 ENCOUNTER — TELEPHONE (OUTPATIENT)
Dept: ONCOLOGY | Age: 53
End: 2018-08-23

## 2018-08-23 ENCOUNTER — OFFICE VISIT (OUTPATIENT)
Dept: ONCOLOGY | Age: 53
End: 2018-08-23
Payer: MEDICARE

## 2018-08-23 ENCOUNTER — HOSPITAL ENCOUNTER (OUTPATIENT)
Dept: INFUSION THERAPY | Age: 53
Discharge: HOME | End: 2018-08-23
Payer: MEDICARE

## 2018-08-23 VITALS
OXYGEN SATURATION: 100 % | BODY MASS INDEX: 26.06 KG/M2 | TEMPERATURE: 97.9 F | HEART RATE: 91 BPM | WEIGHT: 159 LBS | DIASTOLIC BLOOD PRESSURE: 71 MMHG | RESPIRATION RATE: 18 BRPM | SYSTOLIC BLOOD PRESSURE: 118 MMHG

## 2018-08-23 DIAGNOSIS — D69.3 ACUTE ITP (HCC): ICD-10-CM

## 2018-08-23 DIAGNOSIS — D69.3 ACUTE ITP (HCC): Primary | ICD-10-CM

## 2018-08-23 LAB
ABSOLUTE EOS #: 0.1 K/UL (ref 0–0.4)
ABSOLUTE IMMATURE GRANULOCYTE: ABNORMAL K/UL (ref 0–0.3)
ABSOLUTE LYMPH #: 3.1 K/UL (ref 1–4.8)
ABSOLUTE MONO #: 0.8 K/UL (ref 0.1–1.2)
ALBUMIN SERPL-MCNC: 4.6 G/DL (ref 3.5–5.2)
ALBUMIN/GLOBULIN RATIO: 1.7 (ref 1–2.5)
ALP BLD-CCNC: 66 U/L (ref 35–104)
ALT SERPL-CCNC: 16 U/L (ref 5–33)
ANION GAP SERPL CALCULATED.3IONS-SCNC: 13 MMOL/L (ref 9–17)
AST SERPL-CCNC: 14 U/L
BASOPHILS # BLD: 1 % (ref 0–2)
BASOPHILS ABSOLUTE: 0.1 K/UL (ref 0–0.2)
BILIRUB SERPL-MCNC: 0.65 MG/DL (ref 0.3–1.2)
BUN BLDV-MCNC: 18 MG/DL (ref 6–20)
BUN/CREAT BLD: NORMAL (ref 9–20)
CALCIUM SERPL-MCNC: 9.9 MG/DL (ref 8.6–10.4)
CHLORIDE BLD-SCNC: 100 MMOL/L (ref 98–107)
CO2: 27 MMOL/L (ref 20–31)
CREAT SERPL-MCNC: 0.79 MG/DL (ref 0.5–0.9)
DIFFERENTIAL TYPE: ABNORMAL
EOSINOPHILS RELATIVE PERCENT: 1 % (ref 1–4)
GFR AFRICAN AMERICAN: >60 ML/MIN
GFR NON-AFRICAN AMERICAN: >60 ML/MIN
GFR SERPL CREATININE-BSD FRML MDRD: NORMAL ML/MIN/{1.73_M2}
GFR SERPL CREATININE-BSD FRML MDRD: NORMAL ML/MIN/{1.73_M2}
GLUCOSE BLD-MCNC: 85 MG/DL (ref 70–99)
HBV SURFACE AB TITR SER: <3.5 MIU/ML
HCT VFR BLD CALC: 46 % (ref 36–46)
HEMOGLOBIN: 15.8 G/DL (ref 12–16)
HEPATITIS B SURFACE ANTIGEN: NONREACTIVE
IMMATURE GRANULOCYTES: ABNORMAL %
LYMPHOCYTES # BLD: 31 % (ref 24–44)
MCH RBC QN AUTO: 31.7 PG (ref 26–34)
MCHC RBC AUTO-ENTMCNC: 34.3 G/DL (ref 31–37)
MCV RBC AUTO: 92.2 FL (ref 80–100)
MONOCYTES # BLD: 8 % (ref 2–11)
NRBC AUTOMATED: ABNORMAL PER 100 WBC
PDW BLD-RTO: 14.6 % (ref 12.5–15.4)
PLATELET # BLD: 84 K/UL (ref 140–450)
PLATELET ESTIMATE: ABNORMAL
PMV BLD AUTO: 8.6 FL (ref 6–12)
POTASSIUM SERPL-SCNC: 3.9 MMOL/L (ref 3.7–5.3)
RBC # BLD: 4.99 M/UL (ref 4–5.2)
RBC # BLD: ABNORMAL 10*6/UL
SEG NEUTROPHILS: 59 % (ref 36–66)
SEGMENTED NEUTROPHILS ABSOLUTE COUNT: 5.9 K/UL (ref 1.8–7.7)
SODIUM BLD-SCNC: 140 MMOL/L (ref 135–144)
TOTAL PROTEIN: 7.3 G/DL (ref 6.4–8.3)
WBC # BLD: 10 K/UL (ref 3.5–11)
WBC # BLD: ABNORMAL 10*3/UL

## 2018-08-23 PROCEDURE — 6360000002 HC RX W HCPCS: Performed by: INTERNAL MEDICINE

## 2018-08-23 PROCEDURE — 96415 CHEMO IV INFUSION ADDL HR: CPT

## 2018-08-23 PROCEDURE — 99214 OFFICE O/P EST MOD 30 MIN: CPT

## 2018-08-23 PROCEDURE — 96413 CHEMO IV INFUSION 1 HR: CPT

## 2018-08-23 PROCEDURE — 96376 TX/PRO/DX INJ SAME DRUG ADON: CPT

## 2018-08-23 PROCEDURE — 86317 IMMUNOASSAY INFECTIOUS AGENT: CPT

## 2018-08-23 PROCEDURE — 80053 COMPREHEN METABOLIC PANEL: CPT

## 2018-08-23 PROCEDURE — 36415 COLL VENOUS BLD VENIPUNCTURE: CPT

## 2018-08-23 PROCEDURE — 96375 TX/PRO/DX INJ NEW DRUG ADDON: CPT

## 2018-08-23 PROCEDURE — 87340 HEPATITIS B SURFACE AG IA: CPT

## 2018-08-23 PROCEDURE — 85025 COMPLETE CBC W/AUTO DIFF WBC: CPT

## 2018-08-23 PROCEDURE — 6370000000 HC RX 637 (ALT 250 FOR IP): Performed by: INTERNAL MEDICINE

## 2018-08-23 PROCEDURE — 2580000003 HC RX 258: Performed by: INTERNAL MEDICINE

## 2018-08-23 PROCEDURE — 99999 PR OFFICE/OUTPT VISIT,PROCEDURE ONLY: CPT | Performed by: INTERNAL MEDICINE

## 2018-08-23 RX ORDER — DIPHENHYDRAMINE HYDROCHLORIDE 50 MG/ML
25 INJECTION INTRAMUSCULAR; INTRAVENOUS ONCE
Status: COMPLETED | OUTPATIENT
Start: 2018-08-23 | End: 2018-08-23

## 2018-08-23 RX ADMIN — DIPHENHYDRAMINE HYDROCHLORIDE 25 MG: 50 INJECTION, SOLUTION INTRAMUSCULAR; INTRAVENOUS at 10:16

## 2018-08-23 RX ADMIN — RITUXIMAB 690 MG: 10 INJECTION, SOLUTION INTRAVENOUS at 10:38

## 2018-08-23 RX ADMIN — ACETAMINOPHEN 650 MG: 325 TABLET ORAL at 10:15

## 2018-08-23 RX ADMIN — DIPHENHYDRAMINE HYDROCHLORIDE 25 MG: 50 INJECTION, SOLUTION INTRAMUSCULAR; INTRAVENOUS at 11:51

## 2018-08-23 ASSESSMENT — PAIN SCALES - GENERAL: PAINLEVEL_OUTOF10: 0

## 2018-08-23 NOTE — PROGRESS NOTES
Patient arrived for [address] Located within [facility] collected and reviewed - proceed with treatment   Pre meds given and rituxan started at 46 ml/hr, rate increase every 30 mins with monitoring of VS  Rate increased to 138 ml/hr and patient c/o feeling \"tickeling\" in her throat, coughing noted. Rituxan stopped and 25 mg IV Benadryl given, patient stated that she is also feeling tingling in her face and eyes. Dr Yoshi Cooper notifed and told to hold Rituxan until he evals her in infusion  Dr Yoshi Cooper saw patient in ifusion and the above reactions were gone and the patient was feeling better, ok to restart Rituxan at 92 ml/hr  Rate increased from 138-184 ml/hr and patient became hypotensive and c/o feeling lightheaded and dizzy, she states she thinks she might throw up, Rituxan stopped and NS started to flush line, Dr Yoshi Cooper notifed and saw patient in infusion BP slowing improving OK to restart Rituxan at 92 ml/hr and to max increase to 138 ml/hr continue to monitor patient and if additional reactions occur we will stop Rituxan for the day.    Tolerated rest of infusion without incident ambulated to exit in stable condition   Return 8/31 for C2 Rituxan   Manda Foreman RN     Orders received for max rate of rituxan 138 ml/hr and 50 mg IV benadryl prior for remaining infusions will notify pharmacy and [place]      Please see flowsheet for Vitals and MAR for Rituxan rates

## 2018-08-24 RX ORDER — DIPHENHYDRAMINE HYDROCHLORIDE 50 MG/ML
50 INJECTION INTRAMUSCULAR; INTRAVENOUS ONCE
Status: CANCELLED | OUTPATIENT
Start: 2018-08-24 | End: 2018-08-24

## 2018-08-27 ENCOUNTER — HOSPITAL ENCOUNTER (OUTPATIENT)
Age: 53
Discharge: HOME | End: 2018-08-27
Payer: MEDICARE

## 2018-08-27 DIAGNOSIS — D69.3 ACUTE ITP (HCC): ICD-10-CM

## 2018-08-27 LAB
ABSOLUTE EOS #: 0.2 K/UL (ref 0–0.4)
ABSOLUTE IMMATURE GRANULOCYTE: ABNORMAL K/UL (ref 0–0.3)
ABSOLUTE LYMPH #: 1.9 K/UL (ref 1–4.8)
ABSOLUTE MONO #: 0.7 K/UL (ref 0.1–1.2)
ALBUMIN SERPL-MCNC: 4.1 G/DL (ref 3.5–5.2)
ALBUMIN/GLOBULIN RATIO: 1.6 (ref 1–2.5)
ALP BLD-CCNC: 56 U/L (ref 35–104)
ALT SERPL-CCNC: 18 U/L (ref 5–33)
ANION GAP SERPL CALCULATED.3IONS-SCNC: 15 MMOL/L (ref 9–17)
AST SERPL-CCNC: 14 U/L
BASOPHILS # BLD: 1 % (ref 0–2)
BASOPHILS ABSOLUTE: 0 K/UL (ref 0–0.2)
BILIRUB SERPL-MCNC: 0.47 MG/DL (ref 0.3–1.2)
BUN BLDV-MCNC: 23 MG/DL (ref 6–20)
BUN/CREAT BLD: ABNORMAL (ref 9–20)
CALCIUM SERPL-MCNC: 9.2 MG/DL (ref 8.6–10.4)
CHLORIDE BLD-SCNC: 103 MMOL/L (ref 98–107)
CO2: 25 MMOL/L (ref 20–31)
CREAT SERPL-MCNC: 0.81 MG/DL (ref 0.5–0.9)
DIFFERENTIAL TYPE: ABNORMAL
EOSINOPHILS RELATIVE PERCENT: 2 % (ref 1–4)
GFR AFRICAN AMERICAN: >60 ML/MIN
GFR NON-AFRICAN AMERICAN: >60 ML/MIN
GFR SERPL CREATININE-BSD FRML MDRD: ABNORMAL ML/MIN/{1.73_M2}
GFR SERPL CREATININE-BSD FRML MDRD: ABNORMAL ML/MIN/{1.73_M2}
GLUCOSE BLD-MCNC: 99 MG/DL (ref 70–99)
HCT VFR BLD CALC: 45.6 % (ref 36–46)
HEMOGLOBIN: 15.4 G/DL (ref 12–16)
IMMATURE GRANULOCYTES: ABNORMAL %
LYMPHOCYTES # BLD: 27 % (ref 24–44)
MCH RBC QN AUTO: 31.3 PG (ref 26–34)
MCHC RBC AUTO-ENTMCNC: 33.8 G/DL (ref 31–37)
MCV RBC AUTO: 92.7 FL (ref 80–100)
MONOCYTES # BLD: 10 % (ref 2–11)
NRBC AUTOMATED: ABNORMAL PER 100 WBC
PDW BLD-RTO: 14.8 % (ref 12.5–15.4)
PLATELET # BLD: 93 K/UL (ref 140–450)
PLATELET ESTIMATE: ABNORMAL
PMV BLD AUTO: 8.2 FL (ref 6–12)
POTASSIUM SERPL-SCNC: 3.6 MMOL/L (ref 3.7–5.3)
RBC # BLD: 4.92 M/UL (ref 4–5.2)
RBC # BLD: ABNORMAL 10*6/UL
SEG NEUTROPHILS: 60 % (ref 36–66)
SEGMENTED NEUTROPHILS ABSOLUTE COUNT: 4.2 K/UL (ref 1.8–7.7)
SODIUM BLD-SCNC: 143 MMOL/L (ref 135–144)
TOTAL PROTEIN: 6.6 G/DL (ref 6.4–8.3)
WBC # BLD: 7 K/UL (ref 3.5–11)
WBC # BLD: ABNORMAL 10*3/UL

## 2018-08-27 PROCEDURE — 80053 COMPREHEN METABOLIC PANEL: CPT

## 2018-08-27 PROCEDURE — 85025 COMPLETE CBC W/AUTO DIFF WBC: CPT

## 2018-08-27 PROCEDURE — 36415 COLL VENOUS BLD VENIPUNCTURE: CPT

## 2018-08-31 ENCOUNTER — TELEPHONE (OUTPATIENT)
Dept: ONCOLOGY | Age: 53
End: 2018-08-31

## 2018-08-31 ENCOUNTER — HOSPITAL ENCOUNTER (OUTPATIENT)
Dept: INFUSION THERAPY | Age: 53
Discharge: HOME | End: 2018-08-31
Payer: MEDICARE

## 2018-08-31 VITALS
WEIGHT: 161.6 LBS | BODY MASS INDEX: 26.48 KG/M2 | DIASTOLIC BLOOD PRESSURE: 86 MMHG | TEMPERATURE: 97.6 F | HEART RATE: 62 BPM | SYSTOLIC BLOOD PRESSURE: 158 MMHG | RESPIRATION RATE: 18 BRPM

## 2018-08-31 DIAGNOSIS — D69.3 ACUTE ITP (HCC): ICD-10-CM

## 2018-08-31 LAB
ABSOLUTE EOS #: 0.2 K/UL (ref 0–0.4)
ABSOLUTE IMMATURE GRANULOCYTE: ABNORMAL K/UL (ref 0–0.3)
ABSOLUTE LYMPH #: 1.8 K/UL (ref 1–4.8)
ABSOLUTE MONO #: 0.6 K/UL (ref 0.1–1.2)
ALBUMIN SERPL-MCNC: 4.3 G/DL (ref 3.5–5.2)
ALBUMIN/GLOBULIN RATIO: 1.9 (ref 1–2.5)
ALP BLD-CCNC: 56 U/L (ref 35–104)
ALT SERPL-CCNC: 16 U/L (ref 5–33)
ANION GAP SERPL CALCULATED.3IONS-SCNC: 13 MMOL/L (ref 9–17)
AST SERPL-CCNC: 13 U/L
BASOPHILS # BLD: 0 % (ref 0–2)
BASOPHILS ABSOLUTE: 0 K/UL (ref 0–0.2)
BILIRUB SERPL-MCNC: 0.62 MG/DL (ref 0.3–1.2)
BUN BLDV-MCNC: 19 MG/DL (ref 6–20)
BUN/CREAT BLD: ABNORMAL (ref 9–20)
CALCIUM SERPL-MCNC: 9.3 MG/DL (ref 8.6–10.4)
CHLORIDE BLD-SCNC: 103 MMOL/L (ref 98–107)
CO2: 27 MMOL/L (ref 20–31)
CREAT SERPL-MCNC: 0.82 MG/DL (ref 0.5–0.9)
DIFFERENTIAL TYPE: ABNORMAL
EOSINOPHILS RELATIVE PERCENT: 2 % (ref 1–4)
GFR AFRICAN AMERICAN: >60 ML/MIN
GFR NON-AFRICAN AMERICAN: >60 ML/MIN
GFR SERPL CREATININE-BSD FRML MDRD: ABNORMAL ML/MIN/{1.73_M2}
GFR SERPL CREATININE-BSD FRML MDRD: ABNORMAL ML/MIN/{1.73_M2}
GLUCOSE BLD-MCNC: 100 MG/DL (ref 70–99)
HCT VFR BLD CALC: 45.5 % (ref 36–46)
HEMOGLOBIN: 15.3 G/DL (ref 12–16)
IMMATURE GRANULOCYTES: ABNORMAL %
LYMPHOCYTES # BLD: 23 % (ref 24–44)
MCH RBC QN AUTO: 31.4 PG (ref 26–34)
MCHC RBC AUTO-ENTMCNC: 33.7 G/DL (ref 31–37)
MCV RBC AUTO: 93.2 FL (ref 80–100)
MONOCYTES # BLD: 7 % (ref 2–11)
NRBC AUTOMATED: ABNORMAL PER 100 WBC
PDW BLD-RTO: 15 % (ref 12.5–15.4)
PLATELET # BLD: 121 K/UL (ref 140–450)
PLATELET ESTIMATE: ABNORMAL
PMV BLD AUTO: 7.9 FL (ref 6–12)
POTASSIUM SERPL-SCNC: 3.9 MMOL/L (ref 3.7–5.3)
RBC # BLD: 4.88 M/UL (ref 4–5.2)
RBC # BLD: ABNORMAL 10*6/UL
SEG NEUTROPHILS: 68 % (ref 36–66)
SEGMENTED NEUTROPHILS ABSOLUTE COUNT: 5.3 K/UL (ref 1.8–7.7)
SODIUM BLD-SCNC: 143 MMOL/L (ref 135–144)
TOTAL PROTEIN: 6.6 G/DL (ref 6.4–8.3)
WBC # BLD: 7.9 K/UL (ref 3.5–11)
WBC # BLD: ABNORMAL 10*3/UL

## 2018-08-31 PROCEDURE — 36415 COLL VENOUS BLD VENIPUNCTURE: CPT

## 2018-08-31 PROCEDURE — 6360000002 HC RX W HCPCS: Performed by: INTERNAL MEDICINE

## 2018-08-31 PROCEDURE — 96375 TX/PRO/DX INJ NEW DRUG ADDON: CPT

## 2018-08-31 PROCEDURE — 80053 COMPREHEN METABOLIC PANEL: CPT

## 2018-08-31 PROCEDURE — 96413 CHEMO IV INFUSION 1 HR: CPT

## 2018-08-31 PROCEDURE — 6370000000 HC RX 637 (ALT 250 FOR IP): Performed by: INTERNAL MEDICINE

## 2018-08-31 PROCEDURE — 2580000003 HC RX 258: Performed by: INTERNAL MEDICINE

## 2018-08-31 PROCEDURE — 96415 CHEMO IV INFUSION ADDL HR: CPT

## 2018-08-31 PROCEDURE — 85025 COMPLETE CBC W/AUTO DIFF WBC: CPT

## 2018-08-31 RX ORDER — SODIUM CHLORIDE 9 MG/ML
INJECTION, SOLUTION INTRAVENOUS ONCE
Status: COMPLETED | OUTPATIENT
Start: 2018-08-31 | End: 2018-08-31

## 2018-08-31 RX ORDER — DEXAMETHASONE SODIUM PHOSPHATE 10 MG/ML
10 INJECTION, SOLUTION INTRAMUSCULAR; INTRAVENOUS ONCE
Status: COMPLETED | OUTPATIENT
Start: 2018-08-31 | End: 2018-08-31

## 2018-08-31 RX ORDER — ACETAMINOPHEN 325 MG/1
650 TABLET ORAL ONCE
Status: CANCELLED | OUTPATIENT
Start: 2018-08-31

## 2018-08-31 RX ORDER — SODIUM CHLORIDE 0.9 % (FLUSH) 0.9 %
10 SYRINGE (ML) INJECTION PRN
Status: CANCELLED | OUTPATIENT
Start: 2018-08-31

## 2018-08-31 RX ORDER — DIPHENHYDRAMINE HYDROCHLORIDE 50 MG/ML
50 INJECTION INTRAMUSCULAR; INTRAVENOUS ONCE
Status: CANCELLED | OUTPATIENT
Start: 2018-08-31 | End: 2018-08-31

## 2018-08-31 RX ORDER — ACETAMINOPHEN 325 MG/1
650 TABLET ORAL ONCE
Status: COMPLETED | OUTPATIENT
Start: 2018-08-31 | End: 2018-08-31

## 2018-08-31 RX ORDER — EPINEPHRINE 1 MG/ML
0.3 INJECTION, SOLUTION, CONCENTRATE INTRAVENOUS PRN
Status: CANCELLED | OUTPATIENT
Start: 2018-08-31

## 2018-08-31 RX ORDER — SODIUM CHLORIDE 9 MG/ML
INJECTION, SOLUTION INTRAVENOUS CONTINUOUS
Status: CANCELLED | OUTPATIENT
Start: 2018-08-31

## 2018-08-31 RX ORDER — DIPHENHYDRAMINE HYDROCHLORIDE 50 MG/ML
50 INJECTION INTRAMUSCULAR; INTRAVENOUS ONCE
Status: COMPLETED | OUTPATIENT
Start: 2018-08-31 | End: 2018-08-31

## 2018-08-31 RX ORDER — 0.9 % SODIUM CHLORIDE 0.9 %
10 VIAL (ML) INJECTION ONCE
Status: CANCELLED | OUTPATIENT
Start: 2018-08-31 | End: 2018-08-31

## 2018-08-31 RX ORDER — SODIUM CHLORIDE 9 MG/ML
INJECTION, SOLUTION INTRAVENOUS ONCE
Status: CANCELLED | OUTPATIENT
Start: 2018-08-31

## 2018-08-31 RX ORDER — SODIUM CHLORIDE 0.9 % (FLUSH) 0.9 %
5 SYRINGE (ML) INJECTION PRN
Status: CANCELLED | OUTPATIENT
Start: 2018-08-31

## 2018-08-31 RX ORDER — HEPARIN SODIUM (PORCINE) LOCK FLUSH IV SOLN 100 UNIT/ML 100 UNIT/ML
500 SOLUTION INTRAVENOUS PRN
Status: CANCELLED | OUTPATIENT
Start: 2018-08-31

## 2018-08-31 RX ORDER — METHYLPREDNISOLONE SODIUM SUCCINATE 125 MG/2ML
125 INJECTION, POWDER, LYOPHILIZED, FOR SOLUTION INTRAMUSCULAR; INTRAVENOUS ONCE
Status: CANCELLED | OUTPATIENT
Start: 2018-08-31 | End: 2018-08-31

## 2018-08-31 RX ADMIN — DIPHENHYDRAMINE HYDROCHLORIDE 50 MG: 50 INJECTION, SOLUTION INTRAMUSCULAR; INTRAVENOUS at 08:40

## 2018-08-31 RX ADMIN — DEXAMETHASONE SODIUM PHOSPHATE 10 MG: 10 INJECTION, SOLUTION INTRAMUSCULAR; INTRAVENOUS at 08:43

## 2018-08-31 RX ADMIN — ACETAMINOPHEN 650 MG: 325 TABLET ORAL at 08:39

## 2018-08-31 RX ADMIN — RITUXIMAB 700 MG: 10 INJECTION, SOLUTION INTRAVENOUS at 09:05

## 2018-08-31 RX ADMIN — SODIUM CHLORIDE: 9 INJECTION, SOLUTION INTRAVENOUS at 08:35

## 2018-08-31 ASSESSMENT — PAIN SCALES - GENERAL: PAINLEVEL_OUTOF10: 0

## 2018-08-31 NOTE — PROGRESS NOTES
Pt here for C2D1 of rituxan. Denies any new complaints. Labs drawn and results reviewed. Pt states her normal systolic B/P runs [id number]'S. Pt was treated without incident and d/c'd in stable condition.

## 2018-08-31 NOTE — TELEPHONE ENCOUNTER
met with patient during treatment to introduce self and services. Lakeshia Argueta reported having a \"horrible\" first treatment due to allergic reactions and side effects. Lakeshia Argueta is hopeful that things are under control and she can finish out treatment smoother. Other than side effects patient has no concerns at this time.  provided contact information and encouraged patient to call with any questions or concerns.

## 2018-09-06 ENCOUNTER — HOSPITAL ENCOUNTER (OUTPATIENT)
Dept: INFUSION THERAPY | Age: 53
Discharge: HOME | End: 2018-09-06
Payer: MEDICARE

## 2018-09-06 ENCOUNTER — OFFICE VISIT (OUTPATIENT)
Dept: ONCOLOGY | Age: 53
End: 2018-09-06
Payer: MEDICARE

## 2018-09-06 VITALS
DIASTOLIC BLOOD PRESSURE: 76 MMHG | WEIGHT: 164.6 LBS | RESPIRATION RATE: 16 BRPM | TEMPERATURE: 97.6 F | BODY MASS INDEX: 26.97 KG/M2 | SYSTOLIC BLOOD PRESSURE: 144 MMHG | HEART RATE: 71 BPM

## 2018-09-06 VITALS
HEART RATE: 81 BPM | SYSTOLIC BLOOD PRESSURE: 153 MMHG | WEIGHT: 164.6 LBS | TEMPERATURE: 97.6 F | BODY MASS INDEX: 26.97 KG/M2 | DIASTOLIC BLOOD PRESSURE: 84 MMHG

## 2018-09-06 DIAGNOSIS — D69.3 ACUTE ITP (HCC): ICD-10-CM

## 2018-09-06 LAB
ABSOLUTE EOS #: 0.1 K/UL (ref 0–0.4)
ABSOLUTE IMMATURE GRANULOCYTE: ABNORMAL K/UL (ref 0–0.3)
ABSOLUTE LYMPH #: 1.7 K/UL (ref 1–4.8)
ABSOLUTE MONO #: 0.6 K/UL (ref 0.1–1.2)
ALBUMIN SERPL-MCNC: 4.2 G/DL (ref 3.5–5.2)
ALBUMIN/GLOBULIN RATIO: 1.8 (ref 1–2.5)
ALP BLD-CCNC: 59 U/L (ref 35–104)
ALT SERPL-CCNC: 20 U/L (ref 5–33)
ANION GAP SERPL CALCULATED.3IONS-SCNC: 13 MMOL/L (ref 9–17)
AST SERPL-CCNC: 16 U/L
BASOPHILS # BLD: 1 % (ref 0–2)
BASOPHILS ABSOLUTE: 0 K/UL (ref 0–0.2)
BILIRUB SERPL-MCNC: 0.58 MG/DL (ref 0.3–1.2)
BUN BLDV-MCNC: 17 MG/DL (ref 6–20)
BUN/CREAT BLD: ABNORMAL (ref 9–20)
CALCIUM SERPL-MCNC: 9.3 MG/DL (ref 8.6–10.4)
CHLORIDE BLD-SCNC: 101 MMOL/L (ref 98–107)
CO2: 28 MMOL/L (ref 20–31)
CREAT SERPL-MCNC: 0.79 MG/DL (ref 0.5–0.9)
DIFFERENTIAL TYPE: ABNORMAL
EOSINOPHILS RELATIVE PERCENT: 1 % (ref 1–4)
GFR AFRICAN AMERICAN: >60 ML/MIN
GFR NON-AFRICAN AMERICAN: >60 ML/MIN
GFR SERPL CREATININE-BSD FRML MDRD: ABNORMAL ML/MIN/{1.73_M2}
GFR SERPL CREATININE-BSD FRML MDRD: ABNORMAL ML/MIN/{1.73_M2}
GLUCOSE BLD-MCNC: 105 MG/DL (ref 70–99)
HCT VFR BLD CALC: 45 % (ref 36–46)
HEMOGLOBIN: 15.3 G/DL (ref 12–16)
IMMATURE GRANULOCYTES: ABNORMAL %
LYMPHOCYTES # BLD: 21 % (ref 24–44)
MCH RBC QN AUTO: 31.9 PG (ref 26–34)
MCHC RBC AUTO-ENTMCNC: 34 G/DL (ref 31–37)
MCV RBC AUTO: 93.6 FL (ref 80–100)
MONOCYTES # BLD: 8 % (ref 2–11)
NRBC AUTOMATED: ABNORMAL PER 100 WBC
PDW BLD-RTO: 15 % (ref 12.5–15.4)
PLATELET # BLD: 137 K/UL (ref 140–450)
PLATELET ESTIMATE: ABNORMAL
PMV BLD AUTO: 7.5 FL (ref 6–12)
POTASSIUM SERPL-SCNC: 3.8 MMOL/L (ref 3.7–5.3)
RBC # BLD: 4.8 M/UL (ref 4–5.2)
RBC # BLD: ABNORMAL 10*6/UL
SEG NEUTROPHILS: 69 % (ref 36–66)
SEGMENTED NEUTROPHILS ABSOLUTE COUNT: 5.6 K/UL (ref 1.8–7.7)
SODIUM BLD-SCNC: 142 MMOL/L (ref 135–144)
TOTAL PROTEIN: 6.5 G/DL (ref 6.4–8.3)
WBC # BLD: 8.1 K/UL (ref 3.5–11)
WBC # BLD: ABNORMAL 10*3/UL

## 2018-09-06 PROCEDURE — 96415 CHEMO IV INFUSION ADDL HR: CPT

## 2018-09-06 PROCEDURE — G8428 CUR MEDS NOT DOCUMENT: HCPCS | Performed by: INTERNAL MEDICINE

## 2018-09-06 PROCEDURE — 80053 COMPREHEN METABOLIC PANEL: CPT

## 2018-09-06 PROCEDURE — 36415 COLL VENOUS BLD VENIPUNCTURE: CPT

## 2018-09-06 PROCEDURE — 96375 TX/PRO/DX INJ NEW DRUG ADDON: CPT

## 2018-09-06 PROCEDURE — 1036F TOBACCO NON-USER: CPT | Performed by: INTERNAL MEDICINE

## 2018-09-06 PROCEDURE — 2580000003 HC RX 258: Performed by: INTERNAL MEDICINE

## 2018-09-06 PROCEDURE — 6360000002 HC RX W HCPCS: Performed by: INTERNAL MEDICINE

## 2018-09-06 PROCEDURE — 6370000000 HC RX 637 (ALT 250 FOR IP): Performed by: INTERNAL MEDICINE

## 2018-09-06 PROCEDURE — 99214 OFFICE O/P EST MOD 30 MIN: CPT | Performed by: INTERNAL MEDICINE

## 2018-09-06 PROCEDURE — 85025 COMPLETE CBC W/AUTO DIFF WBC: CPT

## 2018-09-06 PROCEDURE — 96413 CHEMO IV INFUSION 1 HR: CPT

## 2018-09-06 PROCEDURE — G8419 CALC BMI OUT NRM PARAM NOF/U: HCPCS | Performed by: INTERNAL MEDICINE

## 2018-09-06 PROCEDURE — 3017F COLORECTAL CA SCREEN DOC REV: CPT | Performed by: INTERNAL MEDICINE

## 2018-09-06 RX ORDER — SODIUM CHLORIDE 0.9 % (FLUSH) 0.9 %
5 SYRINGE (ML) INJECTION PRN
Status: CANCELLED | OUTPATIENT
Start: 2018-09-06

## 2018-09-06 RX ORDER — PANTOPRAZOLE SODIUM 40 MG/1
40 GRANULE, DELAYED RELEASE ORAL
Qty: 30 EACH | Refills: 2 | Status: SHIPPED | OUTPATIENT
Start: 2018-09-06 | End: 2018-10-23 | Stop reason: ALTCHOICE

## 2018-09-06 RX ORDER — METHYLPREDNISOLONE SODIUM SUCCINATE 125 MG/2ML
125 INJECTION, POWDER, LYOPHILIZED, FOR SOLUTION INTRAMUSCULAR; INTRAVENOUS ONCE
Status: CANCELLED | OUTPATIENT
Start: 2018-09-06 | End: 2018-09-06

## 2018-09-06 RX ORDER — ACETAMINOPHEN 325 MG/1
650 TABLET ORAL ONCE
Status: COMPLETED | OUTPATIENT
Start: 2018-09-06 | End: 2018-09-06

## 2018-09-06 RX ORDER — SODIUM CHLORIDE 0.9 % (FLUSH) 0.9 %
10 SYRINGE (ML) INJECTION PRN
Status: CANCELLED | OUTPATIENT
Start: 2018-09-06

## 2018-09-06 RX ORDER — ACETAMINOPHEN 325 MG/1
650 TABLET ORAL ONCE
Status: CANCELLED | OUTPATIENT
Start: 2018-09-06

## 2018-09-06 RX ORDER — PREDNISONE 10 MG/1
TABLET ORAL
Qty: 60 TABLET | Refills: 0 | Status: SHIPPED | OUTPATIENT
Start: 2018-09-06 | End: 2018-10-23 | Stop reason: ALTCHOICE

## 2018-09-06 RX ORDER — DIPHENHYDRAMINE HYDROCHLORIDE 50 MG/ML
50 INJECTION INTRAMUSCULAR; INTRAVENOUS ONCE
Status: CANCELLED | OUTPATIENT
Start: 2018-09-06 | End: 2018-09-06

## 2018-09-06 RX ORDER — 0.9 % SODIUM CHLORIDE 0.9 %
10 VIAL (ML) INJECTION ONCE
Status: CANCELLED | OUTPATIENT
Start: 2018-09-06 | End: 2018-09-06

## 2018-09-06 RX ORDER — DIPHENHYDRAMINE HYDROCHLORIDE 50 MG/ML
50 INJECTION INTRAMUSCULAR; INTRAVENOUS ONCE
Status: COMPLETED | OUTPATIENT
Start: 2018-09-06 | End: 2018-09-06

## 2018-09-06 RX ORDER — EPINEPHRINE 1 MG/ML
0.3 INJECTION, SOLUTION, CONCENTRATE INTRAVENOUS PRN
Status: CANCELLED | OUTPATIENT
Start: 2018-09-06

## 2018-09-06 RX ORDER — HEPARIN SODIUM (PORCINE) LOCK FLUSH IV SOLN 100 UNIT/ML 100 UNIT/ML
500 SOLUTION INTRAVENOUS PRN
Status: CANCELLED | OUTPATIENT
Start: 2018-09-06

## 2018-09-06 RX ORDER — SODIUM CHLORIDE 9 MG/ML
INJECTION, SOLUTION INTRAVENOUS ONCE
Status: COMPLETED | OUTPATIENT
Start: 2018-09-06 | End: 2018-09-06

## 2018-09-06 RX ORDER — SODIUM CHLORIDE 9 MG/ML
INJECTION, SOLUTION INTRAVENOUS CONTINUOUS
Status: CANCELLED | OUTPATIENT
Start: 2018-09-06

## 2018-09-06 RX ORDER — SODIUM CHLORIDE 9 MG/ML
INJECTION, SOLUTION INTRAVENOUS ONCE
Status: CANCELLED | OUTPATIENT
Start: 2018-09-06

## 2018-09-06 RX ADMIN — SODIUM CHLORIDE: 9 INJECTION, SOLUTION INTRAVENOUS at 08:24

## 2018-09-06 RX ADMIN — RITUXIMAB 700 MG: 10 INJECTION, SOLUTION INTRAVENOUS at 08:54

## 2018-09-06 RX ADMIN — ACETAMINOPHEN 650 MG: 325 TABLET ORAL at 08:24

## 2018-09-06 RX ADMIN — DIPHENHYDRAMINE HYDROCHLORIDE 50 MG: 50 INJECTION, SOLUTION INTRAMUSCULAR; INTRAVENOUS at 08:24

## 2018-09-06 ASSESSMENT — PAIN SCALES - GENERAL: PAINLEVEL_OUTOF10: 0

## 2018-09-06 NOTE — PROGRESS NOTES
Pt here for 3 of 4 Rituxan infusion. Labs drawn and reviewed. Pt seen by Dr You Villa at chair side for follow up, refer to his note. Pt was treated without incident and discharged in stable condition. Pt will return in 1 week for 4 of 4.

## 2018-09-06 NOTE — PROGRESS NOTES
[peanut-containing drug products]. FAMILY HISTORY: Negative for any hematological or oncological conditions. SOCIAL HISTORY:  reports that she has never smoked. She has never used smokeless tobacco. She reports that she drinks alcohol. She reports that she does not use drugs. REVIEW OF SYSTEMS:   General: no fever or night sweats, Weight is stable. Fatigue   ENT: No double or blurred vision, no tinnitus or hearing problem, no dysphagia or sore throat   Respiratory: No chest pain, no shortness of breath, no cough or hemoptysis. Cardiovascular: Denies chest pain, PND or orthopnea. No L E swelling or palpitations. Gastrointestinal:  No nausea or vomiting, abdominal pain, diarrhea or constipation. Heartburn - controlled  Genitourinary: Denies dysuria, hematuria, frequency, urgency or incontinence. Neurological: Denies headaches, decreased LOC, no sensory or motor focal deficits. Musculoskeletal:  No arthralgia no back pain or joint swelling. Bone pain, side effect   Skin: There are no rashes or bleeding. Petechia ecchymosis    Psychiatric:  No anxiety, no depression. Endocrine: No diabetes or thyroid disease. Hematologic: No bleeding, no adenopathy. PHYSICAL EXAM: Shows a well appearing 48y.o.-year-old female who is not in pain or distress. Vital Signs: not currently breastfeeding. HEENT: Normocephalic and atraumatic. Pupils are equal, round, reactive to light and accommodation. Extraocular muscles are intact. Neck: Showed no JVD, no carotid bruit . Lungs: Clear to auscultation bilaterally. Heart: Regular without any murmur. Abdomen: Soft, nontender. No hepatosplenomegaly. Extremities: Lower extremities show no edema, clubbing, or cyanosis. Breasts: Examination not done today.  Neuro exam: intact cranial nerves bilaterally no motor or sensory deficit, gait is normal. Lymphatic: no adenopathy appreciated in the supraclavicular, axillary, cervical or inguinal area Skin: petechia and

## 2018-09-06 NOTE — PATIENT INSTRUCTIONS
Change prednisone to 10 mg BID X 1 week then 10 mg daily  rituxan today  rv next week with rituxan need labs next week.    No need for labs until next Thursday

## 2018-09-06 NOTE — LETTER
Extremities: Lower extremities show no edema, clubbing, or cyanosis. Breasts: Examination not done today. Neuro exam: intact cranial nerves bilaterally no motor or sensory deficit, gait is normal. Lymphatic: no adenopathy appreciated in the supraclavicular, axillary, cervical or inguinal area Skin: petechia and bruising    REVIEW OF LABORATORY DATA:   Lab Results   Component Value Date    WBC 8.1 09/06/2018    HGB 15.3 09/06/2018    HCT 45.0 09/06/2018    MCV 93.6 09/06/2018     (L) 09/06/2018           Chemistry        Component Value Date/Time     09/06/2018 0757    K 3.8 09/06/2018 0757     09/06/2018 0757    CO2 28 09/06/2018 0757    BUN 17 09/06/2018 0757    CREATININE 0.79 09/06/2018 0757        Component Value Date/Time    CALCIUM 9.3 09/06/2018 0757    ALKPHOS 59 09/06/2018 0757    AST 16 09/06/2018 0757    ALT 20 09/06/2018 0757    BILITOT 0.58 09/06/2018 0757          REVIEW OF RADIOLOGICAL RESULTS:     IMPRESSION:   1. ITP, steroid refractory  2. Started 4 day course of steroids - good initial response. 3. Steroid dependent, plan to transition to Rituxan    PLAN:   1. We reviewed her lab work and her PLT continue recovery. 2. I completed toxicity check. 3. We discussed tapering down schedule for Prednisone. 4. Treat today as planned. 5. I am refilling protonix and writing for 10mg of Prednisone. 6. Return in 1 week for 4th cycle. If you have questions, please do not hesitate to call me. I look forward to following Jonathan Gutierrez along with you.     Sincerely,    Jennifer Peñaloza MD  Hematology/ Oncology  Cell [phone]

## 2018-09-10 ENCOUNTER — TELEPHONE (OUTPATIENT)
Dept: ONCOLOGY | Age: 53
End: 2018-09-10

## 2018-09-10 NOTE — TELEPHONE ENCOUNTER
received call from patient. Hannah Harris reports that she is looking into getting mold removed from home to help with her recovery. Hannah Harris was told by someone that there are multiple grants available to assist in mold removal.  stated he would look into available assistance. Hannah Harris had previously contacted her home insurance provider who reported it was \"her problem\" and to get her roof fixed.  will call with assistance update when available.

## 2018-09-13 ENCOUNTER — TELEPHONE (OUTPATIENT)
Dept: ONCOLOGY | Age: 53
End: 2018-09-13

## 2018-09-13 ENCOUNTER — OFFICE VISIT (OUTPATIENT)
Dept: ONCOLOGY | Age: 53
End: 2018-09-13
Payer: MEDICARE

## 2018-09-13 ENCOUNTER — HOSPITAL ENCOUNTER (OUTPATIENT)
Dept: INFUSION THERAPY | Age: 53
Discharge: HOME | End: 2018-09-13
Payer: MEDICARE

## 2018-09-13 VITALS
DIASTOLIC BLOOD PRESSURE: 88 MMHG | SYSTOLIC BLOOD PRESSURE: 150 MMHG | HEART RATE: 69 BPM | TEMPERATURE: 97.4 F | RESPIRATION RATE: 16 BRPM | BODY MASS INDEX: 26.94 KG/M2 | WEIGHT: 164.4 LBS

## 2018-09-13 VITALS
DIASTOLIC BLOOD PRESSURE: 91 MMHG | BODY MASS INDEX: 26.94 KG/M2 | TEMPERATURE: 97.4 F | SYSTOLIC BLOOD PRESSURE: 161 MMHG | RESPIRATION RATE: 16 BRPM | HEART RATE: 59 BPM | WEIGHT: 164.4 LBS

## 2018-09-13 DIAGNOSIS — D69.3 ACUTE ITP (HCC): ICD-10-CM

## 2018-09-13 DIAGNOSIS — D69.3 ACUTE ITP (HCC): Primary | ICD-10-CM

## 2018-09-13 LAB
ABSOLUTE EOS #: 0.1 K/UL (ref 0–0.4)
ABSOLUTE IMMATURE GRANULOCYTE: ABNORMAL K/UL (ref 0–0.3)
ABSOLUTE LYMPH #: 1.9 K/UL (ref 1–4.8)
ABSOLUTE MONO #: 0.7 K/UL (ref 0.1–1.2)
ALBUMIN SERPL-MCNC: 4.3 G/DL (ref 3.5–5.2)
ALBUMIN/GLOBULIN RATIO: 1.9 (ref 1–2.5)
ALP BLD-CCNC: 58 U/L (ref 35–104)
ALT SERPL-CCNC: 25 U/L (ref 5–33)
ANION GAP SERPL CALCULATED.3IONS-SCNC: 12 MMOL/L (ref 9–17)
AST SERPL-CCNC: 16 U/L
BASOPHILS # BLD: 1 % (ref 0–2)
BASOPHILS ABSOLUTE: 0 K/UL (ref 0–0.2)
BILIRUB SERPL-MCNC: 0.66 MG/DL (ref 0.3–1.2)
BUN BLDV-MCNC: 20 MG/DL (ref 6–20)
BUN/CREAT BLD: ABNORMAL (ref 9–20)
CALCIUM SERPL-MCNC: 9.1 MG/DL (ref 8.6–10.4)
CHLORIDE BLD-SCNC: 102 MMOL/L (ref 98–107)
CO2: 26 MMOL/L (ref 20–31)
CREAT SERPL-MCNC: 0.92 MG/DL (ref 0.5–0.9)
DIFFERENTIAL TYPE: ABNORMAL
EOSINOPHILS RELATIVE PERCENT: 1 % (ref 1–4)
GFR AFRICAN AMERICAN: >60 ML/MIN
GFR NON-AFRICAN AMERICAN: >60 ML/MIN
GFR SERPL CREATININE-BSD FRML MDRD: ABNORMAL ML/MIN/{1.73_M2}
GFR SERPL CREATININE-BSD FRML MDRD: ABNORMAL ML/MIN/{1.73_M2}
GLUCOSE BLD-MCNC: 96 MG/DL (ref 70–99)
HCT VFR BLD CALC: 46.4 % (ref 36–46)
HEMOGLOBIN: 15.5 G/DL (ref 12–16)
IMMATURE GRANULOCYTES: ABNORMAL %
LYMPHOCYTES # BLD: 22 % (ref 24–44)
MCH RBC QN AUTO: 31.2 PG (ref 26–34)
MCHC RBC AUTO-ENTMCNC: 33.3 G/DL (ref 31–37)
MCV RBC AUTO: 93.9 FL (ref 80–100)
MONOCYTES # BLD: 9 % (ref 2–11)
NRBC AUTOMATED: ABNORMAL PER 100 WBC
PDW BLD-RTO: 14.9 % (ref 12.5–15.4)
PLATELET # BLD: 150 K/UL (ref 140–450)
PLATELET ESTIMATE: ABNORMAL
PMV BLD AUTO: 7.7 FL (ref 6–12)
POTASSIUM SERPL-SCNC: 3.6 MMOL/L (ref 3.7–5.3)
RBC # BLD: 4.94 M/UL (ref 4–5.2)
RBC # BLD: ABNORMAL 10*6/UL
SEG NEUTROPHILS: 67 % (ref 36–66)
SEGMENTED NEUTROPHILS ABSOLUTE COUNT: 5.7 K/UL (ref 1.8–7.7)
SODIUM BLD-SCNC: 140 MMOL/L (ref 135–144)
TOTAL PROTEIN: 6.6 G/DL (ref 6.4–8.3)
WBC # BLD: 8.4 K/UL (ref 3.5–11)
WBC # BLD: ABNORMAL 10*3/UL

## 2018-09-13 PROCEDURE — G8419 CALC BMI OUT NRM PARAM NOF/U: HCPCS | Performed by: INTERNAL MEDICINE

## 2018-09-13 PROCEDURE — 99214 OFFICE O/P EST MOD 30 MIN: CPT | Performed by: INTERNAL MEDICINE

## 2018-09-13 PROCEDURE — 85025 COMPLETE CBC W/AUTO DIFF WBC: CPT

## 2018-09-13 PROCEDURE — 80053 COMPREHEN METABOLIC PANEL: CPT

## 2018-09-13 PROCEDURE — 3017F COLORECTAL CA SCREEN DOC REV: CPT | Performed by: INTERNAL MEDICINE

## 2018-09-13 PROCEDURE — G8427 DOCREV CUR MEDS BY ELIG CLIN: HCPCS | Performed by: INTERNAL MEDICINE

## 2018-09-13 PROCEDURE — 96413 CHEMO IV INFUSION 1 HR: CPT

## 2018-09-13 PROCEDURE — 6360000002 HC RX W HCPCS: Performed by: INTERNAL MEDICINE

## 2018-09-13 PROCEDURE — 36415 COLL VENOUS BLD VENIPUNCTURE: CPT

## 2018-09-13 PROCEDURE — 2580000003 HC RX 258: Performed by: INTERNAL MEDICINE

## 2018-09-13 PROCEDURE — 6370000000 HC RX 637 (ALT 250 FOR IP): Performed by: INTERNAL MEDICINE

## 2018-09-13 PROCEDURE — 96415 CHEMO IV INFUSION ADDL HR: CPT

## 2018-09-13 PROCEDURE — 96375 TX/PRO/DX INJ NEW DRUG ADDON: CPT

## 2018-09-13 PROCEDURE — 1036F TOBACCO NON-USER: CPT | Performed by: INTERNAL MEDICINE

## 2018-09-13 RX ORDER — HEPARIN SODIUM (PORCINE) LOCK FLUSH IV SOLN 100 UNIT/ML 100 UNIT/ML
500 SOLUTION INTRAVENOUS PRN
Status: CANCELLED | OUTPATIENT
Start: 2018-09-13

## 2018-09-13 RX ORDER — ACETAMINOPHEN 325 MG/1
650 TABLET ORAL ONCE
Status: COMPLETED | OUTPATIENT
Start: 2018-09-13 | End: 2018-09-13

## 2018-09-13 RX ORDER — DIPHENHYDRAMINE HYDROCHLORIDE 50 MG/ML
50 INJECTION INTRAMUSCULAR; INTRAVENOUS ONCE
Status: COMPLETED | OUTPATIENT
Start: 2018-09-13 | End: 2018-09-13

## 2018-09-13 RX ORDER — DIPHENHYDRAMINE HYDROCHLORIDE 50 MG/ML
50 INJECTION INTRAMUSCULAR; INTRAVENOUS ONCE
Status: CANCELLED | OUTPATIENT
Start: 2018-09-13 | End: 2018-09-13

## 2018-09-13 RX ORDER — SODIUM CHLORIDE 0.9 % (FLUSH) 0.9 %
10 SYRINGE (ML) INJECTION PRN
Status: CANCELLED | OUTPATIENT
Start: 2018-09-13

## 2018-09-13 RX ORDER — SODIUM CHLORIDE 0.9 % (FLUSH) 0.9 %
5 SYRINGE (ML) INJECTION PRN
Status: CANCELLED | OUTPATIENT
Start: 2018-09-13

## 2018-09-13 RX ORDER — DIPHENHYDRAMINE HCL 25 MG
50 TABLET ORAL ONCE
Status: DISCONTINUED | OUTPATIENT
Start: 2018-09-13 | End: 2018-09-13

## 2018-09-13 RX ORDER — SODIUM CHLORIDE 9 MG/ML
INJECTION, SOLUTION INTRAVENOUS ONCE
Status: CANCELLED | OUTPATIENT
Start: 2018-09-13

## 2018-09-13 RX ORDER — METHYLPREDNISOLONE SODIUM SUCCINATE 125 MG/2ML
125 INJECTION, POWDER, LYOPHILIZED, FOR SOLUTION INTRAMUSCULAR; INTRAVENOUS ONCE
Status: CANCELLED | OUTPATIENT
Start: 2018-09-13 | End: 2018-09-13

## 2018-09-13 RX ORDER — SODIUM CHLORIDE 9 MG/ML
INJECTION, SOLUTION INTRAVENOUS CONTINUOUS
Status: CANCELLED | OUTPATIENT
Start: 2018-09-13

## 2018-09-13 RX ORDER — 0.9 % SODIUM CHLORIDE 0.9 %
10 VIAL (ML) INJECTION ONCE
Status: CANCELLED | OUTPATIENT
Start: 2018-09-13 | End: 2018-09-13

## 2018-09-13 RX ORDER — DIPHENHYDRAMINE HYDROCHLORIDE 50 MG/ML
50 INJECTION INTRAMUSCULAR; INTRAVENOUS ONCE
Status: DISCONTINUED | OUTPATIENT
Start: 2018-09-13 | End: 2018-09-13

## 2018-09-13 RX ORDER — ACETAMINOPHEN 325 MG/1
650 TABLET ORAL ONCE
Status: CANCELLED | OUTPATIENT
Start: 2018-09-13

## 2018-09-13 RX ORDER — EPINEPHRINE 1 MG/ML
0.3 INJECTION, SOLUTION, CONCENTRATE INTRAVENOUS PRN
Status: CANCELLED | OUTPATIENT
Start: 2018-09-13

## 2018-09-13 RX ORDER — SODIUM CHLORIDE 9 MG/ML
INJECTION, SOLUTION INTRAVENOUS ONCE
Status: COMPLETED | OUTPATIENT
Start: 2018-09-13 | End: 2018-09-13

## 2018-09-13 RX ADMIN — ACETAMINOPHEN 650 MG: 325 TABLET ORAL at 08:26

## 2018-09-13 RX ADMIN — SODIUM CHLORIDE: 9 INJECTION, SOLUTION INTRAVENOUS at 08:26

## 2018-09-13 RX ADMIN — RITUXIMAB 700 MG: 10 INJECTION, SOLUTION INTRAVENOUS at 09:14

## 2018-09-13 RX ADMIN — DIPHENHYDRAMINE HYDROCHLORIDE 50 MG: 50 INJECTION, SOLUTION INTRAMUSCULAR; INTRAVENOUS at 08:26

## 2018-09-13 ASSESSMENT — PAIN SCALES - GENERAL: PAINLEVEL_OUTOF10: 0

## 2018-09-13 NOTE — PROGRESS NOTES
DIAGNOSIS:   1. Severe thrombocytopenia, likely ITP with petechia and bruising, no active bleeding, steroid dependent    CURRENT THERAPY:  1. Start steroids course (high dose dex)  2018 - good initial response  2. Transition to Rituxan    BRIEF CASE HISTORY:  Maribeth Bella is a very pleasant 48 y.o. female who is referred to us for thrombocytopenia. She recently had CBC via PCP which showed PLT count at 5. She has no other history of lab work. She notes she has petechia ecchymosis which started about a year ago and has been bruising easily over the last weeks. She was on steroids for shoulder dislocation a few moths ago and had GI distress. She reports the bruising started after that. She had hypothyroidism and takes Synthroid. She has not started new medication recently and does not take any blood thinners or pain medication. She has no history of stroke, heart attack, previous blood disorders, diabetes, or cancer. She does not drink alcohol or smoke cigarettes. Started on steroids 2018 - good initial response. She was found to be steroid dependent, plan for Rituxan. INTERIM HISTORY: The patient presents for follow up and cycle #4 of Rituxan. She is taking 2 10mg Prednisone daily. She has some fatigue, bleeding and bruising has resolved. She reports mold was found in her attic at home and in the work place. PAST MEDICAL HISTORY: has a past medical history of Acute ITP ([address] Atrial fibrillation ([facility] [address] Hyperthyroidism; and Wears dentures. PAST SURGICAL HISTORY: has a past surgical history that includes  section (). CURRENT MEDICATIONS:  has a current medication list which includes the following prescription(s): pantoprazole sodium, prednisone, and levothyroxine, and the following Facility-Administered Medications: riTUXimab (RITUXAN) 700 mg in sodium chloride 0.9 % 500 mL chemo IVPB.     ALLERGIES:  is allergic to bee venom and nuts [peanut-containing drug products]. FAMILY HISTORY: Negative for any hematological or oncological conditions. SOCIAL HISTORY:  reports that she has never smoked. She has never used smokeless tobacco. She reports that she drinks alcohol. She reports that she does not use drugs. REVIEW OF SYSTEMS:   General: no fever or night sweats, Weight is stable. Fatigue   ENT: No double or blurred vision, no tinnitus or hearing problem, no dysphagia or sore throat   Respiratory: No chest pain, no shortness of breath, no cough or hemoptysis. Cardiovascular: Denies chest pain, PND or orthopnea. No L E swelling or palpitations. Gastrointestinal:  No nausea or vomiting, abdominal pain, diarrhea or constipation. Heartburn - controlled  Genitourinary: Denies dysuria, hematuria, frequency, urgency or incontinence. Neurological: Denies headaches, decreased LOC, no sensory or motor focal deficits. Musculoskeletal:  No arthralgia no back pain or joint swelling. Bone pain, side effect   Skin: There are no rashes or bleeding. Petechia ecchymosis    Psychiatric:  No anxiety, no depression. Endocrine: No diabetes or thyroid disease. Hematologic: No bleeding, no adenopathy. PHYSICAL EXAM: Shows a well appearing 48y.o.-year-old female who is not in pain or distress. Vital Signs: Blood pressure (!) 150/88, pulse 69, temperature 97.4 °F (36.3 °C), temperature source Oral, resp. rate 16, weight 164 lb 6.4 oz (74.6 kg), not currently breastfeeding. HEENT: Normocephalic and atraumatic. Pupils are equal, round, reactive to light and accommodation. Extraocular muscles are intact. Neck: Showed no JVD, no carotid bruit . Lungs: Clear to auscultation bilaterally. Heart: Regular without any murmur. Abdomen: Soft, nontender. No hepatosplenomegaly. Extremities: Lower extremities show no edema, clubbing, or cyanosis. Breasts: Examination not done today.  Neuro exam: intact cranial nerves bilaterally no motor or sensory deficit, gait is normal. Lymphatic: no adenopathy appreciated in the supraclavicular, axillary, cervical or inguinal area Skin: petechia and bruising    REVIEW OF LABORATORY DATA:   Lab Results   Component Value Date    WBC 8.4 09/13/2018    HGB 15.5 09/13/2018    HCT 46.4 (H) 09/13/2018    MCV 93.9 09/13/2018     09/13/2018           Chemistry        Component Value Date/Time     09/13/2018 0759    K 3.6 (L) 09/13/2018 0759     09/13/2018 0759    CO2 26 09/13/2018 0759    BUN 20 09/13/2018 0759    CREATININE 0.92 (H) 09/13/2018 0759        Component Value Date/Time    CALCIUM 9.1 09/13/2018 0759    ALKPHOS 58 09/13/2018 0759    AST 16 09/13/2018 0759    ALT 25 09/13/2018 0759    BILITOT 0.66 09/13/2018 0759          REVIEW OF RADIOLOGICAL RESULTS:     IMPRESSION:   1. ITP, steroid refractory  2. Started 4 day course of steroids - good initial response. 3. Steroid dependent, plan to transition to Rituxan    PLAN:   1. We reviewed her lab work and her PLT has recovered. 2. We discussed taper down schedule for the Prednisone, she will take 10 mg daily for 1 week, then 10 mg every other day. 3. I explained her fatigue is likely bone marrow recovery. 4. Due to immunosuppression with steroids I advised her to avoid infectious situations. 5. Plan for repeat lab work in 2 weeks. 6. Return in 2 weeks.

## 2018-09-13 NOTE — PROGRESS NOTES
Pt here for 4 of 4 Rituxan. Denies any new complaints. Pt seen by Dr Pascale Magallanes at chair side for follow up, refer to his note. Labs drawn from port and results reviewed. Pt given handout on foods higher in K+ and educated about including in diet to help increase K+ level. Pt was treated without incident and d/c'd in stable condition. Pt will return in 2 weeks for labs.

## 2018-09-25 ENCOUNTER — OFFICE VISIT (OUTPATIENT)
Dept: ONCOLOGY | Age: 53
End: 2018-09-25
Payer: MEDICARE

## 2018-09-25 ENCOUNTER — HOSPITAL ENCOUNTER (OUTPATIENT)
Age: 53
Discharge: HOME | End: 2018-09-25
Payer: MEDICARE

## 2018-09-25 VITALS
BODY MASS INDEX: 27.53 KG/M2 | RESPIRATION RATE: 16 BRPM | HEART RATE: 83 BPM | WEIGHT: 168 LBS | TEMPERATURE: 97.8 F | SYSTOLIC BLOOD PRESSURE: 151 MMHG | DIASTOLIC BLOOD PRESSURE: 60 MMHG

## 2018-09-25 DIAGNOSIS — D69.3 ACUTE ITP (HCC): ICD-10-CM

## 2018-09-25 LAB
ABSOLUTE EOS #: 0.2 K/UL (ref 0–0.4)
ABSOLUTE IMMATURE GRANULOCYTE: ABNORMAL K/UL (ref 0–0.3)
ABSOLUTE LYMPH #: 1.4 K/UL (ref 1–4.8)
ABSOLUTE MONO #: 0.6 K/UL (ref 0.1–1.2)
BASOPHILS # BLD: 1 % (ref 0–2)
BASOPHILS ABSOLUTE: 0 K/UL (ref 0–0.2)
DIFFERENTIAL TYPE: ABNORMAL
EOSINOPHILS RELATIVE PERCENT: 3 % (ref 1–4)
HCT VFR BLD CALC: 46.1 % (ref 36–46)
HEMOGLOBIN: 15.7 G/DL (ref 12–16)
IMMATURE GRANULOCYTES: ABNORMAL %
LYMPHOCYTES # BLD: 19 % (ref 24–44)
MCH RBC QN AUTO: 31.7 PG (ref 26–34)
MCHC RBC AUTO-ENTMCNC: 34 G/DL (ref 31–37)
MCV RBC AUTO: 93.3 FL (ref 80–100)
MONOCYTES # BLD: 8 % (ref 2–11)
NRBC AUTOMATED: ABNORMAL PER 100 WBC
PDW BLD-RTO: 15 % (ref 12.5–15.4)
PLATELET # BLD: 178 K/UL (ref 140–450)
PLATELET ESTIMATE: ABNORMAL
PMV BLD AUTO: 7.5 FL (ref 6–12)
RBC # BLD: 4.94 M/UL (ref 4–5.2)
RBC # BLD: ABNORMAL 10*6/UL
SEG NEUTROPHILS: 69 % (ref 36–66)
SEGMENTED NEUTROPHILS ABSOLUTE COUNT: 5.4 K/UL (ref 1.8–7.7)
WBC # BLD: 7.8 K/UL (ref 3.5–11)
WBC # BLD: ABNORMAL 10*3/UL

## 2018-09-25 PROCEDURE — 85025 COMPLETE CBC W/AUTO DIFF WBC: CPT

## 2018-09-25 PROCEDURE — 3017F COLORECTAL CA SCREEN DOC REV: CPT | Performed by: INTERNAL MEDICINE

## 2018-09-25 PROCEDURE — G8419 CALC BMI OUT NRM PARAM NOF/U: HCPCS | Performed by: INTERNAL MEDICINE

## 2018-09-25 PROCEDURE — 99211 OFF/OP EST MAY X REQ PHY/QHP: CPT | Performed by: INTERNAL MEDICINE

## 2018-09-25 PROCEDURE — 99214 OFFICE O/P EST MOD 30 MIN: CPT | Performed by: INTERNAL MEDICINE

## 2018-09-25 PROCEDURE — G8427 DOCREV CUR MEDS BY ELIG CLIN: HCPCS | Performed by: INTERNAL MEDICINE

## 2018-09-25 PROCEDURE — 1036F TOBACCO NON-USER: CPT | Performed by: INTERNAL MEDICINE

## 2018-09-25 PROCEDURE — 36415 COLL VENOUS BLD VENIPUNCTURE: CPT

## 2018-09-25 NOTE — PATIENT INSTRUCTIONS
Plan  1- taper steroids to 5 Every other day for 2 weeks then stop  2- need cbc in 2 and 4 weeks  3- rv in 4 weeks

## 2018-10-08 ENCOUNTER — HOSPITAL ENCOUNTER (OUTPATIENT)
Age: 53
Discharge: HOME | End: 2018-10-08
Payer: MEDICARE

## 2018-10-08 DIAGNOSIS — D69.3 ACUTE ITP (HCC): ICD-10-CM

## 2018-10-08 LAB
ABSOLUTE EOS #: 0.3 K/UL (ref 0–0.4)
ABSOLUTE IMMATURE GRANULOCYTE: ABNORMAL K/UL (ref 0–0.3)
ABSOLUTE LYMPH #: 1.4 K/UL (ref 1–4.8)
ABSOLUTE MONO #: 0.7 K/UL (ref 0.1–1.2)
BASOPHILS # BLD: 1 % (ref 0–2)
BASOPHILS ABSOLUTE: 0.1 K/UL (ref 0–0.2)
DIFFERENTIAL TYPE: ABNORMAL
EOSINOPHILS RELATIVE PERCENT: 4 % (ref 1–4)
HCT VFR BLD CALC: 45.4 % (ref 36–46)
HEMOGLOBIN: 15.4 G/DL (ref 12–16)
IMMATURE GRANULOCYTES: ABNORMAL %
LYMPHOCYTES # BLD: 19 % (ref 24–44)
MCH RBC QN AUTO: 31.3 PG (ref 26–34)
MCHC RBC AUTO-ENTMCNC: 33.9 G/DL (ref 31–37)
MCV RBC AUTO: 92.4 FL (ref 80–100)
MONOCYTES # BLD: 10 % (ref 2–11)
NRBC AUTOMATED: ABNORMAL PER 100 WBC
PDW BLD-RTO: 14.4 % (ref 12.5–15.4)
PLATELET # BLD: 236 K/UL (ref 140–450)
PLATELET ESTIMATE: ABNORMAL
PMV BLD AUTO: 7.3 FL (ref 6–12)
RBC # BLD: 4.91 M/UL (ref 4–5.2)
RBC # BLD: ABNORMAL 10*6/UL
SEG NEUTROPHILS: 66 % (ref 36–66)
SEGMENTED NEUTROPHILS ABSOLUTE COUNT: 5 K/UL (ref 1.8–7.7)
WBC # BLD: 7.5 K/UL (ref 3.5–11)
WBC # BLD: ABNORMAL 10*3/UL

## 2018-10-08 PROCEDURE — 36415 COLL VENOUS BLD VENIPUNCTURE: CPT

## 2018-10-08 PROCEDURE — 85025 COMPLETE CBC W/AUTO DIFF WBC: CPT

## 2018-10-23 ENCOUNTER — HOSPITAL ENCOUNTER (OUTPATIENT)
Age: 53
Discharge: HOME | End: 2018-10-23
Payer: MEDICARE

## 2018-10-23 ENCOUNTER — OFFICE VISIT (OUTPATIENT)
Dept: ONCOLOGY | Age: 53
End: 2018-10-23
Payer: MEDICARE

## 2018-10-23 VITALS
BODY MASS INDEX: 27.38 KG/M2 | HEART RATE: 68 BPM | SYSTOLIC BLOOD PRESSURE: 155 MMHG | WEIGHT: 167.1 LBS | DIASTOLIC BLOOD PRESSURE: 100 MMHG | TEMPERATURE: 98.1 F

## 2018-10-23 DIAGNOSIS — Z23 INFLUENZA VACCINE NEEDED: Primary | ICD-10-CM

## 2018-10-23 DIAGNOSIS — D69.3 ACUTE ITP (HCC): ICD-10-CM

## 2018-10-23 DIAGNOSIS — Z80.3 FAMILY HISTORY OF BREAST CANCER IN SISTER: ICD-10-CM

## 2018-10-23 LAB
ABSOLUTE EOS #: 0.4 K/UL (ref 0–0.4)
ABSOLUTE IMMATURE GRANULOCYTE: ABNORMAL K/UL (ref 0–0.3)
ABSOLUTE LYMPH #: 1.7 K/UL (ref 1–4.8)
ABSOLUTE MONO #: 0.8 K/UL (ref 0.1–1.2)
BASOPHILS # BLD: 1 % (ref 0–2)
BASOPHILS ABSOLUTE: 0.1 K/UL (ref 0–0.2)
DIFFERENTIAL TYPE: ABNORMAL
EOSINOPHILS RELATIVE PERCENT: 6 % (ref 1–4)
HCT VFR BLD CALC: 46.7 % (ref 36–46)
HEMOGLOBIN: 15.9 G/DL (ref 12–16)
IMMATURE GRANULOCYTES: ABNORMAL %
LYMPHOCYTES # BLD: 27 % (ref 24–44)
MCH RBC QN AUTO: 31.3 PG (ref 26–34)
MCHC RBC AUTO-ENTMCNC: 34.1 G/DL (ref 31–37)
MCV RBC AUTO: 91.8 FL (ref 80–100)
MONOCYTES # BLD: 13 % (ref 2–11)
NRBC AUTOMATED: ABNORMAL PER 100 WBC
PDW BLD-RTO: 14.8 % (ref 12.5–15.4)
PLATELET # BLD: 240 K/UL (ref 140–450)
PLATELET ESTIMATE: ABNORMAL
PMV BLD AUTO: 7.4 FL (ref 6–12)
RBC # BLD: 5.09 M/UL (ref 4–5.2)
RBC # BLD: ABNORMAL 10*6/UL
SEG NEUTROPHILS: 53 % (ref 36–66)
SEGMENTED NEUTROPHILS ABSOLUTE COUNT: 3.5 K/UL (ref 1.8–7.7)
WBC # BLD: 6.4 K/UL (ref 3.5–11)
WBC # BLD: ABNORMAL 10*3/UL

## 2018-10-23 PROCEDURE — 1036F TOBACCO NON-USER: CPT | Performed by: INTERNAL MEDICINE

## 2018-10-23 PROCEDURE — 99211 OFF/OP EST MAY X REQ PHY/QHP: CPT | Performed by: INTERNAL MEDICINE

## 2018-10-23 PROCEDURE — 99214 OFFICE O/P EST MOD 30 MIN: CPT | Performed by: INTERNAL MEDICINE

## 2018-10-23 PROCEDURE — 36415 COLL VENOUS BLD VENIPUNCTURE: CPT

## 2018-10-23 PROCEDURE — G8419 CALC BMI OUT NRM PARAM NOF/U: HCPCS | Performed by: INTERNAL MEDICINE

## 2018-10-23 PROCEDURE — 90686 IIV4 VACC NO PRSV 0.5 ML IM: CPT | Performed by: INTERNAL MEDICINE

## 2018-10-23 PROCEDURE — 85025 COMPLETE CBC W/AUTO DIFF WBC: CPT

## 2018-10-23 PROCEDURE — G8427 DOCREV CUR MEDS BY ELIG CLIN: HCPCS | Performed by: INTERNAL MEDICINE

## 2018-10-23 PROCEDURE — G8482 FLU IMMUNIZE ORDER/ADMIN: HCPCS | Performed by: INTERNAL MEDICINE

## 2018-10-23 PROCEDURE — 3017F COLORECTAL CA SCREEN DOC REV: CPT | Performed by: INTERNAL MEDICINE

## 2018-10-23 NOTE — LETTER
Jana Benton MD    10/23/2018     JAYLENE Holland - CNP   [address], [address] [facility] [address]    Dear Doctors : Thank you for referring Dorys Flowers, 1965, to me for evaluation. Below are the relevant portions of my assessment and plan of care. DIAGNOSIS:   1. Severe thrombocytopenia, likely ITP with petechia and bruising, no active bleeding, steroid dependent    CURRENT THERAPY:  1. Start steroids course (high dose dex)  2018 - good initial response  2. Transition to Rituxan, with excellent response     BRIEF CASE HISTORY:  Dorys Flowers is a very pleasant 48 y.o. female who is referred to us for thrombocytopenia. She recently had CBC via PCP which showed PLT count at 5. She has no other history of lab work. She notes she has petechia ecchymosis which started about a year ago and has been bruising easily over the last weeks. She was on steroids for shoulder dislocation a few moths ago and had GI distress. She reports the bruising started after that. She had hypothyroidism and takes Synthroid. She has not started new medication recently and does not take any blood thinners or pain medication. She has no history of stroke, heart attack, previous blood disorders, diabetes, or cancer. She does not drink alcohol or smoke cigarettes. Started on steroids 2018 - good initial response. She was found to be steroid dependent, plan for Rituxan. Excellent response. INTERIM HISTORY: The patient presents for follow up for ITP and to review lab work. She is off all steroids and completed Rituxan therapy. She had venous rupture in left a few weeks ago but has resolved. Bone pain in the leg persists - managed with Tylenol. PAST MEDICAL HISTORY: has a past medical history of Acute ITP ([address] Atrial fibrillation ([address] Hyperthyroidism; and Wears dentures.     PAST SURGICAL HISTORY: has a past surgical history that includes

## 2018-10-23 NOTE — PROGRESS NOTES
DIAGNOSIS:   1. Severe thrombocytopenia, likely ITP with petechia and bruising, no active bleeding, steroid dependent    CURRENT THERAPY:  1. Start steroids course (high dose dex)  2018 - good initial response  2. Transition to Rituxan, with excellent response     BRIEF CASE HISTORY:  Obie Mistry is a very pleasant 48 y.o. female who is referred to us for thrombocytopenia. She recently had CBC via PCP which showed PLT count at 5. She has no other history of lab work. She notes she has petechia ecchymosis which started about a year ago and has been bruising easily over the last weeks. She was on steroids for shoulder dislocation a few moths ago and had GI distress. She reports the bruising started after that. She had hypothyroidism and takes Synthroid. She has not started new medication recently and does not take any blood thinners or pain medication. She has no history of stroke, heart attack, previous blood disorders, diabetes, or cancer. She does not drink alcohol or smoke cigarettes. Started on steroids 2018 - good initial response. She was found to be steroid dependent, plan for Rituxan. Excellent response. INTERIM HISTORY: The patient presents for follow up for ITP and to review lab work. She is off all steroids and completed Rituxan therapy. She had venous rupture in left a few weeks ago but has resolved. Bone pain in the leg persists - managed with Tylenol. PAST MEDICAL HISTORY: has a past medical history of Acute ITP ([address] Atrial fibrillation ([address] Hyperthyroidism; and Wears dentures. PAST SURGICAL HISTORY: has a past surgical history that includes  section (). CURRENT MEDICATIONS:  has a current medication list which includes the following prescription(s): levothyroxine. ALLERGIES:  is allergic to bee venom and nuts [peanut-containing drug products]. FAMILY HISTORY: Negative for any hematological or oncological conditions.      SOCIAL HISTORY:  reports that she has never smoked. She has never used smokeless tobacco. She reports that she drinks alcohol. She reports that she does not use drugs. REVIEW OF SYSTEMS:   General: no fever or night sweats, Weight is stable. Fatigue - resolved   ENT: No double or blurred vision, no tinnitus or hearing problem, no dysphagia or sore throat   Respiratory: No chest pain, no shortness of breath, no cough or hemoptysis. Cardiovascular: Denies chest pain, PND or orthopnea. No L E swelling or palpitations. Gastrointestinal:  No nausea or vomiting, abdominal pain, diarrhea or constipation. Heartburn - controlled  Genitourinary: Denies dysuria, hematuria, frequency, urgency or incontinence. Neurological: Denies headaches, decreased LOC, no sensory or motor focal deficits. Musculoskeletal:  No arthralgia no back pain or joint swelling. Bone pain persists - managed  Skin: There are no rashes or bleeding. Petechia ecchymosis    Psychiatric:  No anxiety, no depression. Endocrine: No diabetes or thyroid disease. Hematologic: No bleeding, no adenopathy. PHYSICAL EXAM: Shows a well appearing 48y.o.-year-old female who is not in pain or distress. Vital Signs: Blood pressure (!) 155/100, pulse 68, temperature 98.1 °F (36.7 °C), temperature source Oral, weight 167 lb 1.6 oz (75.8 kg), not currently breastfeeding. HEENT: Normocephalic and atraumatic. Pupils are equal, round, reactive to light and accommodation. Extraocular muscles are intact. Neck: Showed no JVD, no carotid bruit . Lungs: Clear to auscultation bilaterally. Heart: Regular without any murmur. Abdomen: Soft, nontender. No hepatosplenomegaly. Extremities: Lower extremities show no edema, clubbing, or cyanosis. Breasts: Examination not done today.  Neuro exam: intact cranial nerves bilaterally no motor or sensory deficit, gait is normal. Lymphatic: no adenopathy appreciated in the supraclavicular, axillary, cervical or inguinal area Skin: petechia

## 2018-11-19 ENCOUNTER — HOSPITAL ENCOUNTER (OUTPATIENT)
Age: 53
Discharge: HOME | End: 2018-11-19
Payer: MEDICARE

## 2018-11-19 DIAGNOSIS — D69.3 ACUTE ITP (HCC): ICD-10-CM

## 2018-11-19 LAB
ABSOLUTE EOS #: 0.4 K/UL (ref 0–0.4)
ABSOLUTE IMMATURE GRANULOCYTE: ABNORMAL K/UL (ref 0–0.3)
ABSOLUTE LYMPH #: 1.2 K/UL (ref 1–4.8)
ABSOLUTE MONO #: 0.6 K/UL (ref 0.1–1.2)
BASOPHILS # BLD: 1 % (ref 0–2)
BASOPHILS ABSOLUTE: 0 K/UL (ref 0–0.2)
DIFFERENTIAL TYPE: ABNORMAL
EOSINOPHILS RELATIVE PERCENT: 9 % (ref 1–4)
HCT VFR BLD CALC: 45.6 % (ref 36–46)
HEMOGLOBIN: 15.3 G/DL (ref 12–16)
IMMATURE GRANULOCYTES: ABNORMAL %
LYMPHOCYTES # BLD: 27 % (ref 24–44)
MCH RBC QN AUTO: 31 PG (ref 26–34)
MCHC RBC AUTO-ENTMCNC: 33.6 G/DL (ref 31–37)
MCV RBC AUTO: 92.1 FL (ref 80–100)
MONOCYTES # BLD: 13 % (ref 2–11)
NRBC AUTOMATED: ABNORMAL PER 100 WBC
PDW BLD-RTO: 14 % (ref 12.5–15.4)
PLATELET # BLD: 190 K/UL (ref 140–450)
PLATELET ESTIMATE: ABNORMAL
PMV BLD AUTO: 7.2 FL (ref 6–12)
RBC # BLD: 4.95 M/UL (ref 4–5.2)
RBC # BLD: ABNORMAL 10*6/UL
SEG NEUTROPHILS: 50 % (ref 36–66)
SEGMENTED NEUTROPHILS ABSOLUTE COUNT: 2.2 K/UL (ref 1.8–7.7)
WBC # BLD: 4.5 K/UL (ref 3.5–11)
WBC # BLD: ABNORMAL 10*3/UL

## 2018-11-19 PROCEDURE — 36415 COLL VENOUS BLD VENIPUNCTURE: CPT

## 2018-11-19 PROCEDURE — 85025 COMPLETE CBC W/AUTO DIFF WBC: CPT

## 2018-12-17 ENCOUNTER — HOSPITAL ENCOUNTER (OUTPATIENT)
Age: 53
Discharge: HOME | End: 2018-12-17
Payer: MEDICARE

## 2018-12-17 ENCOUNTER — TELEPHONE (OUTPATIENT)
Dept: ONCOLOGY | Age: 53
End: 2018-12-17

## 2018-12-17 DIAGNOSIS — D69.3 ACUTE ITP (HCC): ICD-10-CM

## 2018-12-17 LAB
ABSOLUTE EOS #: 0.3 K/UL (ref 0–0.4)
ABSOLUTE IMMATURE GRANULOCYTE: ABNORMAL K/UL (ref 0–0.3)
ABSOLUTE LYMPH #: 1.2 K/UL (ref 1–4.8)
ABSOLUTE MONO #: 0.6 K/UL (ref 0.1–1.2)
BASOPHILS # BLD: 1 % (ref 0–2)
BASOPHILS ABSOLUTE: 0 K/UL (ref 0–0.2)
DIFFERENTIAL TYPE: ABNORMAL
EOSINOPHILS RELATIVE PERCENT: 8 % (ref 1–4)
HCT VFR BLD CALC: 48 % (ref 36–46)
HEMOGLOBIN: 16.1 G/DL (ref 12–16)
IMMATURE GRANULOCYTES: ABNORMAL %
LYMPHOCYTES # BLD: 25 % (ref 24–44)
MCH RBC QN AUTO: 30.4 PG (ref 26–34)
MCHC RBC AUTO-ENTMCNC: 33.6 G/DL (ref 31–37)
MCV RBC AUTO: 90.4 FL (ref 80–100)
MONOCYTES # BLD: 12 % (ref 2–11)
NRBC AUTOMATED: ABNORMAL PER 100 WBC
PDW BLD-RTO: 13.2 % (ref 12.5–15.4)
PLATELET # BLD: 193 K/UL (ref 140–450)
PLATELET ESTIMATE: ABNORMAL
PMV BLD AUTO: 7.8 FL (ref 6–12)
RBC # BLD: 5.3 M/UL (ref 4–5.2)
RBC # BLD: ABNORMAL 10*6/UL
SEG NEUTROPHILS: 54 % (ref 36–66)
SEGMENTED NEUTROPHILS ABSOLUTE COUNT: 2.5 K/UL (ref 1.8–7.7)
WBC # BLD: 4.6 K/UL (ref 3.5–11)
WBC # BLD: ABNORMAL 10*3/UL

## 2018-12-17 PROCEDURE — 36415 COLL VENOUS BLD VENIPUNCTURE: CPT

## 2018-12-17 PROCEDURE — 85025 COMPLETE CBC W/AUTO DIFF WBC: CPT

## 2019-01-29 ENCOUNTER — TELEPHONE (OUTPATIENT)
Dept: ONCOLOGY | Age: 54
End: 2019-01-29

## 2019-01-29 ENCOUNTER — HOSPITAL ENCOUNTER (OUTPATIENT)
Dept: GENERAL RADIOLOGY | Age: 54
Discharge: HOME | End: 2019-01-31
Payer: MEDICARE

## 2019-01-29 ENCOUNTER — OFFICE VISIT (OUTPATIENT)
Dept: ONCOLOGY | Age: 54
End: 2019-01-29
Payer: MEDICARE

## 2019-01-29 ENCOUNTER — HOSPITAL ENCOUNTER (OUTPATIENT)
Age: 54
Discharge: HOME | End: 2019-01-29
Payer: MEDICARE

## 2019-01-29 ENCOUNTER — HOSPITAL ENCOUNTER (OUTPATIENT)
Age: 54
Discharge: HOME | End: 2019-01-31
Payer: MEDICARE

## 2019-01-29 VITALS
HEART RATE: 65 BPM | SYSTOLIC BLOOD PRESSURE: 131 MMHG | BODY MASS INDEX: 25.62 KG/M2 | WEIGHT: 156.31 LBS | DIASTOLIC BLOOD PRESSURE: 94 MMHG | TEMPERATURE: 97.6 F

## 2019-01-29 DIAGNOSIS — Z86.2 HISTORY OF ITP: ICD-10-CM

## 2019-01-29 DIAGNOSIS — D69.3 ACUTE ITP (HCC): ICD-10-CM

## 2019-01-29 LAB
ABSOLUTE EOS #: 0.3 K/UL (ref 0–0.4)
ABSOLUTE IMMATURE GRANULOCYTE: ABNORMAL K/UL (ref 0–0.3)
ABSOLUTE LYMPH #: 1.3 K/UL (ref 1–4.8)
ABSOLUTE MONO #: 0.5 K/UL (ref 0.1–1.2)
BASOPHILS # BLD: 1 % (ref 0–2)
BASOPHILS ABSOLUTE: 0 K/UL (ref 0–0.2)
DIFFERENTIAL TYPE: ABNORMAL
EOSINOPHILS RELATIVE PERCENT: 6 % (ref 1–4)
HCT VFR BLD CALC: 47.5 % (ref 36–46)
HEMOGLOBIN: 15.9 G/DL (ref 12–16)
IMMATURE GRANULOCYTES: ABNORMAL %
LYMPHOCYTES # BLD: 26 % (ref 24–44)
MCH RBC QN AUTO: 29.8 PG (ref 26–34)
MCHC RBC AUTO-ENTMCNC: 33.5 G/DL (ref 31–37)
MCV RBC AUTO: 89 FL (ref 80–100)
MONOCYTES # BLD: 10 % (ref 2–11)
NRBC AUTOMATED: ABNORMAL PER 100 WBC
PDW BLD-RTO: 14.2 % (ref 12.5–15.4)
PLATELET # BLD: 208 K/UL (ref 140–450)
PLATELET ESTIMATE: ABNORMAL
PMV BLD AUTO: 7.6 FL (ref 6–12)
RBC # BLD: 5.33 M/UL (ref 4–5.2)
RBC # BLD: ABNORMAL 10*6/UL
SEG NEUTROPHILS: 57 % (ref 36–66)
SEGMENTED NEUTROPHILS ABSOLUTE COUNT: 2.8 K/UL (ref 1.8–7.7)
WBC # BLD: 4.9 K/UL (ref 3.5–11)
WBC # BLD: ABNORMAL 10*3/UL

## 2019-01-29 PROCEDURE — 36415 COLL VENOUS BLD VENIPUNCTURE: CPT

## 2019-01-29 PROCEDURE — 73552 X-RAY EXAM OF FEMUR 2/>: CPT

## 2019-01-29 PROCEDURE — 99214 OFFICE O/P EST MOD 30 MIN: CPT | Performed by: INTERNAL MEDICINE

## 2019-01-29 PROCEDURE — 1036F TOBACCO NON-USER: CPT | Performed by: INTERNAL MEDICINE

## 2019-01-29 PROCEDURE — G8482 FLU IMMUNIZE ORDER/ADMIN: HCPCS | Performed by: INTERNAL MEDICINE

## 2019-01-29 PROCEDURE — 99211 OFF/OP EST MAY X REQ PHY/QHP: CPT | Performed by: INTERNAL MEDICINE

## 2019-01-29 PROCEDURE — G8419 CALC BMI OUT NRM PARAM NOF/U: HCPCS | Performed by: INTERNAL MEDICINE

## 2019-01-29 PROCEDURE — 85025 COMPLETE CBC W/AUTO DIFF WBC: CPT

## 2019-01-29 PROCEDURE — 3017F COLORECTAL CA SCREEN DOC REV: CPT | Performed by: INTERNAL MEDICINE

## 2019-01-29 PROCEDURE — G8427 DOCREV CUR MEDS BY ELIG CLIN: HCPCS | Performed by: INTERNAL MEDICINE

## 2019-02-12 ENCOUNTER — HOSPITAL ENCOUNTER (OUTPATIENT)
Age: 54
End: 2019-02-12
Payer: MEDICARE

## 2019-02-16 ENCOUNTER — PATIENT MESSAGE (OUTPATIENT)
Dept: FAMILY MEDICINE CLINIC | Age: 54
End: 2019-02-16

## 2019-02-16 DIAGNOSIS — E04.9 THYROID ENLARGEMENT: Primary | ICD-10-CM

## 2019-02-19 ENCOUNTER — OFFICE VISIT (OUTPATIENT)
Dept: ONCOLOGY | Age: 54
End: 2019-02-19
Payer: MEDICARE

## 2019-02-19 ENCOUNTER — TELEPHONE (OUTPATIENT)
Dept: ONCOLOGY | Age: 54
End: 2019-02-19

## 2019-02-19 ENCOUNTER — HOSPITAL ENCOUNTER (OUTPATIENT)
Age: 54
Discharge: HOME | End: 2019-02-19
Payer: MEDICARE

## 2019-02-19 VITALS
DIASTOLIC BLOOD PRESSURE: 97 MMHG | BODY MASS INDEX: 25.56 KG/M2 | HEART RATE: 63 BPM | WEIGHT: 156 LBS | SYSTOLIC BLOOD PRESSURE: 160 MMHG | TEMPERATURE: 98.1 F

## 2019-02-19 DIAGNOSIS — Z86.2 HISTORY OF ITP: Primary | ICD-10-CM

## 2019-02-19 DIAGNOSIS — D69.3 ACUTE ITP (HCC): ICD-10-CM

## 2019-02-19 LAB
ABSOLUTE EOS #: 0.3 K/UL (ref 0–0.4)
ABSOLUTE IMMATURE GRANULOCYTE: ABNORMAL K/UL (ref 0–0.3)
ABSOLUTE LYMPH #: 1.71 K/UL (ref 1–4.8)
ABSOLUTE MONO #: 0.67 K/UL (ref 0.1–1.2)
BASOPHILS # BLD: 0 % (ref 0–2)
BASOPHILS ABSOLUTE: 0.02 K/UL (ref 0–0.2)
DIFFERENTIAL TYPE: ABNORMAL
EOSINOPHILS RELATIVE PERCENT: 5 % (ref 1–4)
HCT VFR BLD CALC: 44.9 % (ref 36–46)
HEMOGLOBIN: 15.3 G/DL (ref 12–16)
IMMATURE GRANULOCYTES: ABNORMAL %
LYMPHOCYTES # BLD: 27 % (ref 24–44)
MCH RBC QN AUTO: 30 PG (ref 26–34)
MCHC RBC AUTO-ENTMCNC: 34.1 G/DL (ref 31–37)
MCV RBC AUTO: 88 FL (ref 80–100)
MONOCYTES # BLD: 11 % (ref 2–11)
NRBC AUTOMATED: ABNORMAL PER 100 WBC
PDW BLD-RTO: 14.4 % (ref 12.5–15.4)
PLATELET # BLD: 178 K/UL (ref 140–450)
PLATELET ESTIMATE: ABNORMAL
PMV BLD AUTO: 9.3 FL (ref 8–14)
RBC # BLD: 5.1 M/UL (ref 4–5.2)
RBC # BLD: ABNORMAL 10*6/UL
SEG NEUTROPHILS: 57 % (ref 36–66)
SEGMENTED NEUTROPHILS ABSOLUTE COUNT: 3.53 K/UL (ref 1.8–7.7)
WBC # BLD: 6.2 K/UL (ref 3.5–11)
WBC # BLD: ABNORMAL 10*3/UL

## 2019-02-19 PROCEDURE — 88305 TISSUE EXAM BY PATHOLOGIST: CPT

## 2019-02-19 PROCEDURE — 88264 CHROMOSOME ANALYSIS 20-25: CPT

## 2019-02-19 PROCEDURE — 36415 COLL VENOUS BLD VENIPUNCTURE: CPT

## 2019-02-19 PROCEDURE — 88185 FLOWCYTOMETRY/TC ADD-ON: CPT

## 2019-02-19 PROCEDURE — 85025 COMPLETE CBC W/AUTO DIFF WBC: CPT

## 2019-02-19 PROCEDURE — 88237 TISSUE CULTURE BONE MARROW: CPT

## 2019-02-19 PROCEDURE — 38222 DX BONE MARROW BX & ASPIR: CPT | Performed by: INTERNAL MEDICINE

## 2019-02-19 PROCEDURE — 88311 DECALCIFY TISSUE: CPT

## 2019-02-19 PROCEDURE — 88313 SPECIAL STAINS GROUP 2: CPT

## 2019-02-19 PROCEDURE — 88280 CHROMOSOME KARYOTYPE STUDY: CPT

## 2019-02-19 PROCEDURE — 88184 FLOWCYTOMETRY/ TC 1 MARKER: CPT

## 2019-02-19 RX ORDER — ACETAMINOPHEN 325 MG/1
650 TABLET ORAL EVERY 6 HOURS PRN
COMMUNITY
End: 2020-12-15 | Stop reason: ALTCHOICE

## 2019-02-19 RX ORDER — LEVOTHYROXINE SODIUM 0.05 MG/1
50 TABLET ORAL DAILY
Qty: 30 TABLET | Refills: 1 | Status: SHIPPED | OUTPATIENT
Start: 2019-02-19 | End: 2019-04-23 | Stop reason: SDUPTHER

## 2019-02-24 LAB — BONE MARROW REPORT: NORMAL

## 2019-02-25 LAB — FLOW CYTOMETRY, BM: NORMAL

## 2019-02-26 NOTE — PROGRESS NOTES
DIAGNOSIS:   1. Severe thrombocytopenia, likely ITP with petechia and bruising, no active bleeding     CURRENT THERAPY:  1. Start steroids course 2018    BRIEF CASE HISTORY:  Jenelle Chaudhry is a very pleasant 46 y.o. female who is referred to us for thrombocytopenia. She recently had CBC via PCP which showed PLT count at 5. She has no other history of lab work. She notes she has petechia ecchymosis which started about a year ago and has been bruising easily over the last weeks. She was on steroids for shoulder dislocation a few moths ago and had GI distress. She reports the bruising started after that. She had hypothyroidism and takes Synthroid. She has not started new medication recently and does not take any blood thinners or pain medication. She has no history of stroke, heart attack, previous blood disorders, diabetes, or cancer. She does not drink alcohol or smoke cigarettes. PAST MEDICAL HISTORY: has a past medical history of Atrial fibrillation ([address] Hyperthyroidism; and Wears dentures. PAST SURGICAL HISTORY: has a past surgical history that includes  section (). CURRENT MEDICATIONS:  has a current medication list which includes the following prescription(s): levothyroxine, ibu, biotin, diphenhydramine hcl, and loratadine. ALLERGIES:  is allergic to bee venom and nuts [peanut-containing drug products]. FAMILY HISTORY: Negative for any hematological or oncological conditions. SOCIAL HISTORY:  reports that she has never smoked. She has never used smokeless tobacco. She reports that she drinks alcohol. She reports that she does not use drugs. REVIEW OF SYSTEMS:   General: no fever or night sweats, Weight is stable. ENT: No double or blurred vision, no tinnitus or hearing problem, no dysphagia or sore throat   Respiratory: No chest pain, no shortness of breath, no cough or hemoptysis. Cardiovascular: Denies chest pain, PND or orthopnea.  No L E swelling
declines

## 2019-02-27 LAB — CHROMOSOME STUDY: NORMAL

## 2019-02-28 ENCOUNTER — OFFICE VISIT (OUTPATIENT)
Dept: ONCOLOGY | Age: 54
End: 2019-02-28
Payer: MEDICARE

## 2019-02-28 VITALS
HEART RATE: 66 BPM | BODY MASS INDEX: 25.76 KG/M2 | WEIGHT: 157.2 LBS | SYSTOLIC BLOOD PRESSURE: 142 MMHG | RESPIRATION RATE: 18 BRPM | DIASTOLIC BLOOD PRESSURE: 95 MMHG | TEMPERATURE: 97.5 F

## 2019-02-28 DIAGNOSIS — Z86.2 HISTORY OF ITP: Primary | ICD-10-CM

## 2019-02-28 PROCEDURE — 3017F COLORECTAL CA SCREEN DOC REV: CPT | Performed by: INTERNAL MEDICINE

## 2019-02-28 PROCEDURE — 1036F TOBACCO NON-USER: CPT | Performed by: INTERNAL MEDICINE

## 2019-02-28 PROCEDURE — 99211 OFF/OP EST MAY X REQ PHY/QHP: CPT | Performed by: INTERNAL MEDICINE

## 2019-02-28 PROCEDURE — G8427 DOCREV CUR MEDS BY ELIG CLIN: HCPCS | Performed by: INTERNAL MEDICINE

## 2019-02-28 PROCEDURE — G8482 FLU IMMUNIZE ORDER/ADMIN: HCPCS | Performed by: INTERNAL MEDICINE

## 2019-02-28 PROCEDURE — G8419 CALC BMI OUT NRM PARAM NOF/U: HCPCS | Performed by: INTERNAL MEDICINE

## 2019-02-28 PROCEDURE — 99213 OFFICE O/P EST LOW 20 MIN: CPT | Performed by: INTERNAL MEDICINE

## 2019-04-23 NOTE — TELEPHONE ENCOUNTER
LOV: 7/27/2018  LRF: 2/19/2019  RTO: 4 Weeks    Clavisterhart message sent to schedule. Also reminded of labs.      Health Maintenance   Topic Date Due    Cervical cancer screen  08/12/1986    Breast cancer screen  08/12/2005    Shingles Vaccine (1 of 2) 08/12/2015    Colon Cancer Screen FIT/FOBT  07/30/2019    Lipid screen  07/30/2023    DTaP/Tdap/Td vaccine (2 - Td) 10/31/2023    Flu vaccine  Completed    Hepatitis C screen  Completed    HIV screen  Completed    Pneumococcal 0-64 years Vaccine  Aged Out             (applicable per patient's age: Cancer Screenings, Depression Screening, Fall Risk Screening, Immunizations)    LDL Cholesterol (mg/dL)   Date Value   07/30/2018 142 (H)     AST (U/L)   Date Value   09/13/2018 16     ALT (U/L)   Date Value   09/13/2018 25     BUN (mg/dL)   Date Value   09/13/2018 20      (goal A1C is < 7)   (goal LDL is <100) need 30-50% reduction from baseline     BP Readings from Last 3 Encounters:   02/28/19 (!) 142/95   02/19/19 (!) 160/97   01/29/19 (!) 131/94    (goal /80)      All Future Testing planned in [facility]:  Lab Frequency Next Occurrence   HENRIQUE DIGITAL SCREEN W CAD BILATERAL Once 08/26/2018   TSH without Reflex Once 02/19/2019   T4, Free Once 02/19/2019   Lactate Dehydrogenase Once 02/28/2019   CBC Auto Differential     Comprehensive Metabolic Panel         Next Visit Date:  Future Appointments   Date Time Provider Leon Light   5/21/2019 10:20 AM Sagar Anthony MD [address] [place] [address]            Patient Active Problem List:     Chronic non-seasonal allergic rhinitis     Wears dentures     Family history of breast cancer in sister     Abnormal bruising     Acute ITP (HCC)     Thyroid enlargement     History of ITP

## 2019-04-24 RX ORDER — LEVOTHYROXINE SODIUM 0.05 MG/1
50 TABLET ORAL DAILY
Qty: 30 TABLET | Refills: 5 | OUTPATIENT
Start: 2019-04-24

## 2019-04-24 RX ORDER — LEVOTHYROXINE SODIUM 0.05 MG/1
50 TABLET ORAL DAILY
Qty: 30 TABLET | Refills: 2 | Status: SHIPPED | OUTPATIENT
Start: 2019-04-24 | End: 2019-07-21 | Stop reason: SDUPTHER

## 2019-05-14 ENCOUNTER — HOSPITAL ENCOUNTER (OUTPATIENT)
Age: 54
Discharge: HOME | End: 2019-05-14
Payer: MEDICARE

## 2019-05-14 DIAGNOSIS — Z86.2 HISTORY OF ITP: ICD-10-CM

## 2019-05-14 DIAGNOSIS — E04.9 THYROID ENLARGEMENT: ICD-10-CM

## 2019-05-14 LAB
ABSOLUTE EOS #: 0.3 K/UL (ref 0–0.4)
ABSOLUTE IMMATURE GRANULOCYTE: ABNORMAL K/UL (ref 0–0.3)
ABSOLUTE LYMPH #: 0.9 K/UL (ref 1–4.8)
ABSOLUTE MONO #: 0.4 K/UL (ref 0.1–1.2)
ALBUMIN SERPL-MCNC: 4.6 G/DL (ref 3.5–5.2)
ALBUMIN/GLOBULIN RATIO: 1.8 (ref 1–2.5)
ALP BLD-CCNC: 71 U/L (ref 35–104)
ALT SERPL-CCNC: 14 U/L (ref 5–33)
ANION GAP SERPL CALCULATED.3IONS-SCNC: 12 MMOL/L (ref 9–17)
AST SERPL-CCNC: 18 U/L
BASOPHILS # BLD: 1 % (ref 0–2)
BASOPHILS ABSOLUTE: 0.1 K/UL (ref 0–0.2)
BILIRUB SERPL-MCNC: 0.54 MG/DL (ref 0.3–1.2)
BUN BLDV-MCNC: 16 MG/DL (ref 6–20)
BUN/CREAT BLD: NORMAL (ref 9–20)
CALCIUM SERPL-MCNC: 9.8 MG/DL (ref 8.6–10.4)
CHLORIDE BLD-SCNC: 104 MMOL/L (ref 98–107)
CO2: 25 MMOL/L (ref 20–31)
CREAT SERPL-MCNC: 0.78 MG/DL (ref 0.5–0.9)
DIFFERENTIAL TYPE: ABNORMAL
EOSINOPHILS RELATIVE PERCENT: 8 % (ref 1–4)
GFR AFRICAN AMERICAN: >60 ML/MIN
GFR NON-AFRICAN AMERICAN: >60 ML/MIN
GFR SERPL CREATININE-BSD FRML MDRD: NORMAL ML/MIN/{1.73_M2}
GFR SERPL CREATININE-BSD FRML MDRD: NORMAL ML/MIN/{1.73_M2}
GLUCOSE BLD-MCNC: 94 MG/DL (ref 70–99)
HCT VFR BLD CALC: 45.2 % (ref 36–46)
HEMOGLOBIN: 15.7 G/DL (ref 12–16)
IMMATURE GRANULOCYTES: ABNORMAL %
LACTATE DEHYDROGENASE: 197 U/L (ref 135–214)
LYMPHOCYTES # BLD: 23 % (ref 24–44)
MCH RBC QN AUTO: 32 PG (ref 26–34)
MCHC RBC AUTO-ENTMCNC: 34.8 G/DL (ref 31–37)
MCV RBC AUTO: 92 FL (ref 80–100)
MONOCYTES # BLD: 9 % (ref 2–11)
NRBC AUTOMATED: ABNORMAL PER 100 WBC
PDW BLD-RTO: 13.9 % (ref 12.5–15.4)
PLATELET # BLD: 183 K/UL (ref 140–450)
PLATELET ESTIMATE: ABNORMAL
PMV BLD AUTO: 7.3 FL (ref 6–12)
POTASSIUM SERPL-SCNC: 4.7 MMOL/L (ref 3.7–5.3)
RBC # BLD: 4.91 M/UL (ref 4–5.2)
RBC # BLD: ABNORMAL 10*6/UL
SEG NEUTROPHILS: 59 % (ref 36–66)
SEGMENTED NEUTROPHILS ABSOLUTE COUNT: 2.4 K/UL (ref 1.8–7.7)
SODIUM BLD-SCNC: 141 MMOL/L (ref 135–144)
THYROXINE, FREE: 1.56 NG/DL (ref 0.93–1.7)
TOTAL PROTEIN: 7.2 G/DL (ref 6.4–8.3)
TSH SERPL DL<=0.05 MIU/L-ACNC: 2.31 MIU/L (ref 0.3–5)
WBC # BLD: 4.2 K/UL (ref 3.5–11)
WBC # BLD: ABNORMAL 10*3/UL

## 2019-05-14 PROCEDURE — 84439 ASSAY OF FREE THYROXINE: CPT

## 2019-05-14 PROCEDURE — 36415 COLL VENOUS BLD VENIPUNCTURE: CPT

## 2019-05-14 PROCEDURE — 83615 LACTATE (LD) (LDH) ENZYME: CPT

## 2019-05-14 PROCEDURE — 80053 COMPREHEN METABOLIC PANEL: CPT

## 2019-05-14 PROCEDURE — 85025 COMPLETE CBC W/AUTO DIFF WBC: CPT

## 2019-05-14 PROCEDURE — 84443 ASSAY THYROID STIM HORMONE: CPT

## 2019-05-21 ENCOUNTER — OFFICE VISIT (OUTPATIENT)
Dept: ONCOLOGY | Age: 54
End: 2019-05-21
Payer: MEDICARE

## 2019-05-21 VITALS
BODY MASS INDEX: 25.84 KG/M2 | DIASTOLIC BLOOD PRESSURE: 96 MMHG | TEMPERATURE: 97.5 F | SYSTOLIC BLOOD PRESSURE: 163 MMHG | WEIGHT: 157.7 LBS | HEART RATE: 66 BPM

## 2019-05-21 DIAGNOSIS — Z86.2 HISTORY OF ITP: ICD-10-CM

## 2019-05-21 PROCEDURE — 3017F COLORECTAL CA SCREEN DOC REV: CPT | Performed by: INTERNAL MEDICINE

## 2019-05-21 PROCEDURE — 1036F TOBACCO NON-USER: CPT | Performed by: INTERNAL MEDICINE

## 2019-05-21 PROCEDURE — G8427 DOCREV CUR MEDS BY ELIG CLIN: HCPCS | Performed by: INTERNAL MEDICINE

## 2019-05-21 PROCEDURE — G8419 CALC BMI OUT NRM PARAM NOF/U: HCPCS | Performed by: INTERNAL MEDICINE

## 2019-05-21 PROCEDURE — 99211 OFF/OP EST MAY X REQ PHY/QHP: CPT | Performed by: INTERNAL MEDICINE

## 2019-05-21 PROCEDURE — 99214 OFFICE O/P EST MOD 30 MIN: CPT | Performed by: INTERNAL MEDICINE

## 2019-05-21 RX ORDER — OMEPRAZOLE 20 MG/1
20 CAPSULE, DELAYED RELEASE ORAL DAILY
Qty: 30 CAPSULE | Refills: 3 | Status: SHIPPED | OUTPATIENT
Start: 2019-05-21 | End: 2019-09-19 | Stop reason: SDUPTHER

## 2019-05-21 NOTE — PROGRESS NOTES
levothyroxine, and acetaminophen. ALLERGIES:  is allergic to bee venom and nuts [peanut-containing drug products]. FAMILY HISTORY: Negative for any hematological or oncological conditions. SOCIAL HISTORY:  reports that she has never smoked. She has never used smokeless tobacco. She reports that she drinks alcohol. She reports that she does not use drugs. REVIEW OF SYSTEMS:   General: No fever or night sweats, Weight is stable. Fatigue - resolved   ENT: No double or blurred vision, no tinnitus or hearing problem, no dysphagia or sore throat   Respiratory: No chest pain, no shortness of breath, no cough or hemoptysis. Cardiovascular: Denies chest pain, PND or orthopnea. No L E swelling or palpitations. Gastrointestinal:  No nausea or vomiting, diarrhea or constipation. +intermittent abdominal pain  Genitourinary: Denies dysuria, hematuria, frequency, urgency or incontinence. Neurological: Denies headaches, decreased LOC, no sensory or motor focal deficits. Musculoskeletal:  No arthralgia no back pain or joint swelling. Bone pain persists intermittently  Skin: There are no rashes or bleeding. Petechia ecchymosis    Psychiatric:  No anxiety, no depression. Endocrine: No diabetes or thyroid disease. Hematologic: No bleeding, no adenopathy. PHYSICAL EXAM: Shows a well appearing 48y.o.-year-old female who is not in pain or distress. Vital Signs: Blood pressure (!) 163/96, pulse 66, temperature 97.5 °F (36.4 °C), temperature source Oral, weight 157 lb 11.2 oz (71.5 kg), not currently breastfeeding. HEENT: Normocephalic and atraumatic. Pupils are equal, round, reactive to light and accommodation. Extraocular muscles are intact. Neck: Showed no JVD, no carotid bruit . Lungs: Clear to auscultation bilaterally. Heart: Regular without any murmur. Abdomen: Soft, mild tenderness LUQ. No hepatosplenomegaly. Extremities: No point tenderness in knee or hip.  Lower extremities show no edema, clubbing, or cyanosis. Breasts: Examination not done today. Neuro exam: intact cranial nerves bilaterally no motor or sensory deficit, gait is normal. Lymphatic: no adenopathy appreciated in the supraclavicular, axillary, cervical or inguinal area Skin: petechia and bruising; bone marrow site is well healed    REVIEW OF LABORATORY DATA:   Lab Results   Component Value Date    WBC 4.2 05/14/2019    HGB 15.7 05/14/2019    HCT 45.2 05/14/2019    MCV 92.0 05/14/2019     05/14/2019           Chemistry        Component Value Date/Time     05/14/2019 0817    K 4.7 05/14/2019 0817     05/14/2019 0817    CO2 25 05/14/2019 0817    BUN 16 05/14/2019 0817    CREATININE 0.78 05/14/2019 0817        Component Value Date/Time    CALCIUM 9.8 05/14/2019 0817    ALKPHOS 71 05/14/2019 0817    AST 18 05/14/2019 0817    ALT 14 05/14/2019 0817    BILITOT 0.54 05/14/2019 0817          PATHOLOGY:     REVIEW OF RADIOLOGICAL RESULTS:     IMPRESSION:   1. ITP, steroid refractory  2. Started 4 day course of steroids - good initial response. 3. Steroid dependent, plan to transition to Rituxan - excellent response  4. Persistent bone pain, bone marrow was done, negative, ITP active but controlled  5. Abdominal pain, possibly gastritis  - plan for Prilosec    PLAN:   1. We reviewed her lab work, her counts are in range and stable. 2. ITP remains under control. 3. I am writing for Prilosec and I asked her to follow up in 30 days with update  4. Continue with surveillance. 5. Return in 3 months.

## 2019-07-23 ENCOUNTER — TELEPHONE (OUTPATIENT)
Dept: FAMILY MEDICINE CLINIC | Age: 54
End: 2019-07-23

## 2019-08-13 ENCOUNTER — TELEPHONE (OUTPATIENT)
Dept: ONCOLOGY | Age: 54
End: 2019-08-13

## 2019-08-13 ENCOUNTER — OFFICE VISIT (OUTPATIENT)
Dept: ONCOLOGY | Age: 54
End: 2019-08-13
Payer: MEDICARE

## 2019-08-13 ENCOUNTER — HOSPITAL ENCOUNTER (OUTPATIENT)
Age: 54
Discharge: HOME | End: 2019-08-13
Payer: MEDICARE

## 2019-08-13 VITALS
WEIGHT: 158.2 LBS | RESPIRATION RATE: 18 BRPM | BODY MASS INDEX: 25.93 KG/M2 | SYSTOLIC BLOOD PRESSURE: 175 MMHG | HEART RATE: 61 BPM | DIASTOLIC BLOOD PRESSURE: 85 MMHG | TEMPERATURE: 97.9 F

## 2019-08-13 DIAGNOSIS — Z86.2 HISTORY OF ITP: ICD-10-CM

## 2019-08-13 DIAGNOSIS — D69.3 ACUTE ITP (HCC): ICD-10-CM

## 2019-08-13 LAB
ABSOLUTE EOS #: 0.3 K/UL (ref 0–0.4)
ABSOLUTE IMMATURE GRANULOCYTE: ABNORMAL K/UL (ref 0–0.3)
ABSOLUTE LYMPH #: 1.4 K/UL (ref 1–4.8)
ABSOLUTE MONO #: 0.4 K/UL (ref 0.1–1.2)
ALBUMIN SERPL-MCNC: 4.9 G/DL (ref 3.5–5.2)
ALBUMIN/GLOBULIN RATIO: 2 (ref 1–2.5)
ALP BLD-CCNC: 76 U/L (ref 35–104)
ALT SERPL-CCNC: 15 U/L (ref 5–33)
ANION GAP SERPL CALCULATED.3IONS-SCNC: 11 MMOL/L (ref 9–17)
AST SERPL-CCNC: 21 U/L
BASOPHILS # BLD: 1 % (ref 0–2)
BASOPHILS ABSOLUTE: 0 K/UL (ref 0–0.2)
BILIRUB SERPL-MCNC: 0.66 MG/DL (ref 0.3–1.2)
BUN BLDV-MCNC: 13 MG/DL (ref 6–20)
BUN/CREAT BLD: NORMAL (ref 9–20)
CALCIUM SERPL-MCNC: 9.6 MG/DL (ref 8.6–10.4)
CHLORIDE BLD-SCNC: 104 MMOL/L (ref 98–107)
CO2: 25 MMOL/L (ref 20–31)
CREAT SERPL-MCNC: 0.79 MG/DL (ref 0.5–0.9)
DIFFERENTIAL TYPE: ABNORMAL
EOSINOPHILS RELATIVE PERCENT: 6 % (ref 1–4)
GFR AFRICAN AMERICAN: >60 ML/MIN
GFR NON-AFRICAN AMERICAN: >60 ML/MIN
GFR SERPL CREATININE-BSD FRML MDRD: NORMAL ML/MIN/{1.73_M2}
GFR SERPL CREATININE-BSD FRML MDRD: NORMAL ML/MIN/{1.73_M2}
GLUCOSE BLD-MCNC: 93 MG/DL (ref 70–99)
HCT VFR BLD CALC: 44.6 % (ref 36–46)
HEMOGLOBIN: 15.8 G/DL (ref 12–16)
IMMATURE GRANULOCYTES: ABNORMAL %
LYMPHOCYTES # BLD: 28 % (ref 24–44)
MCH RBC QN AUTO: 32.1 PG (ref 26–34)
MCHC RBC AUTO-ENTMCNC: 35.3 G/DL (ref 31–37)
MCV RBC AUTO: 90.8 FL (ref 80–100)
MONOCYTES # BLD: 9 % (ref 2–11)
NRBC AUTOMATED: ABNORMAL PER 100 WBC
PDW BLD-RTO: 13.5 % (ref 12.5–15.4)
PLATELET # BLD: 192 K/UL (ref 140–450)
PLATELET ESTIMATE: ABNORMAL
PMV BLD AUTO: 7.5 FL (ref 6–12)
POTASSIUM SERPL-SCNC: 3.8 MMOL/L (ref 3.7–5.3)
RBC # BLD: 4.92 M/UL (ref 4–5.2)
RBC # BLD: ABNORMAL 10*6/UL
SEG NEUTROPHILS: 56 % (ref 36–66)
SEGMENTED NEUTROPHILS ABSOLUTE COUNT: 2.8 K/UL (ref 1.8–7.7)
SODIUM BLD-SCNC: 140 MMOL/L (ref 135–144)
TOTAL PROTEIN: 7.4 G/DL (ref 6.4–8.3)
WBC # BLD: 4.9 K/UL (ref 3.5–11)
WBC # BLD: ABNORMAL 10*3/UL

## 2019-08-13 PROCEDURE — 99211 OFF/OP EST MAY X REQ PHY/QHP: CPT | Performed by: INTERNAL MEDICINE

## 2019-08-13 PROCEDURE — 99214 OFFICE O/P EST MOD 30 MIN: CPT | Performed by: INTERNAL MEDICINE

## 2019-08-13 PROCEDURE — 80053 COMPREHEN METABOLIC PANEL: CPT

## 2019-08-13 PROCEDURE — 36415 COLL VENOUS BLD VENIPUNCTURE: CPT

## 2019-08-13 PROCEDURE — 85025 COMPLETE CBC W/AUTO DIFF WBC: CPT

## 2019-08-13 PROCEDURE — G8427 DOCREV CUR MEDS BY ELIG CLIN: HCPCS | Performed by: INTERNAL MEDICINE

## 2019-08-13 PROCEDURE — 1036F TOBACCO NON-USER: CPT | Performed by: INTERNAL MEDICINE

## 2019-08-13 PROCEDURE — G8419 CALC BMI OUT NRM PARAM NOF/U: HCPCS | Performed by: INTERNAL MEDICINE

## 2019-08-13 PROCEDURE — 3017F COLORECTAL CA SCREEN DOC REV: CPT | Performed by: INTERNAL MEDICINE

## 2019-08-13 RX ORDER — ACETAMINOPHEN 160 MG
1 TABLET,DISINTEGRATING ORAL DAILY
COMMUNITY
End: 2022-04-28 | Stop reason: SDUPTHER

## 2019-08-13 NOTE — TELEPHONE ENCOUNTER
Appointment scheduled for 8/29/19 per provider request. Would like to discuss lab work at this appointment.

## 2019-08-13 NOTE — PROGRESS NOTES
Breasts: Examination not done today. Neuro exam: intact cranial nerves bilaterally no motor or sensory deficit, gait is normal. Lymphatic: no adenopathy appreciated in the supraclavicular, axillary, cervical or inguinal area Skin: petechia and bruising; bone marrow site is well healed    REVIEW OF LABORATORY DATA:   Lab Results   Component Value Date    WBC 4.9 08/13/2019    HGB 15.8 08/13/2019    HCT 44.6 08/13/2019    MCV 90.8 08/13/2019     08/13/2019           Chemistry        Component Value Date/Time     08/13/2019 1130    K 3.8 08/13/2019 1130     08/13/2019 1130    CO2 25 08/13/2019 1130    BUN 13 08/13/2019 1130    CREATININE 0.79 08/13/2019 1130        Component Value Date/Time    CALCIUM 9.6 08/13/2019 1130    ALKPHOS 76 08/13/2019 1130    AST 21 08/13/2019 1130    ALT 15 08/13/2019 1130    BILITOT 0.66 08/13/2019 1130          PATHOLOGY:     REVIEW OF RADIOLOGICAL RESULTS:     IMPRESSION:   1. ITP, steroid refractory  2. Started 4 day course of steroids - good initial response. 3. Steroid dependent, plan to transition to Rituxan - excellent response  4. Persistent bone pain, bone marrow was done, negative, ITP active but controlled  5. Abdominal pain, possibly gastritis  - plan for Prilosec    PLAN:   1. Her lab work was reviewed and discussed, her PLT remain in range. 2. We discussed prolonged response and relapse treatment. 3. She is doing well in remission. 4. Continue surveillance unchanged. 5. I asked her to continue Prilosec as needed. 6. Return in 3 months.

## 2019-09-19 RX ORDER — OMEPRAZOLE 20 MG/1
CAPSULE, DELAYED RELEASE ORAL
Qty: 30 CAPSULE | Refills: 2 | Status: SHIPPED | OUTPATIENT
Start: 2019-09-19 | End: 2019-12-27 | Stop reason: SDUPTHER

## 2019-10-22 ENCOUNTER — TELEPHONE (OUTPATIENT)
Dept: ONCOLOGY | Age: 54
End: 2019-10-22

## 2019-11-19 ENCOUNTER — TELEPHONE (OUTPATIENT)
Dept: ONCOLOGY | Age: 54
End: 2019-11-19

## 2019-11-19 ENCOUNTER — HOSPITAL ENCOUNTER (OUTPATIENT)
Age: 54
Discharge: HOME | End: 2019-11-19
Payer: MEDICARE

## 2019-11-19 ENCOUNTER — OFFICE VISIT (OUTPATIENT)
Dept: ONCOLOGY | Age: 54
End: 2019-11-19
Payer: MEDICARE

## 2019-11-19 VITALS
SYSTOLIC BLOOD PRESSURE: 166 MMHG | BODY MASS INDEX: 26.02 KG/M2 | WEIGHT: 158.8 LBS | HEART RATE: 68 BPM | DIASTOLIC BLOOD PRESSURE: 100 MMHG | RESPIRATION RATE: 18 BRPM | TEMPERATURE: 98.2 F

## 2019-11-19 DIAGNOSIS — Z86.2 HISTORY OF ITP: ICD-10-CM

## 2019-11-19 DIAGNOSIS — D69.3 ACUTE ITP (HCC): ICD-10-CM

## 2019-11-19 LAB
ABSOLUTE EOS #: 0.2 K/UL (ref 0–0.4)
ABSOLUTE IMMATURE GRANULOCYTE: ABNORMAL K/UL (ref 0–0.3)
ABSOLUTE LYMPH #: 1.6 K/UL (ref 1–4.8)
ABSOLUTE MONO #: 0.4 K/UL (ref 0.1–1.2)
ALBUMIN SERPL-MCNC: 4.9 G/DL (ref 3.5–5.2)
ALBUMIN/GLOBULIN RATIO: 1.8 (ref 1–2.5)
ALP BLD-CCNC: 79 U/L (ref 35–104)
ALT SERPL-CCNC: 17 U/L (ref 5–33)
ANION GAP SERPL CALCULATED.3IONS-SCNC: 9 MMOL/L (ref 9–17)
AST SERPL-CCNC: 21 U/L
BASOPHILS # BLD: 1 % (ref 0–2)
BASOPHILS ABSOLUTE: 0 K/UL (ref 0–0.2)
BILIRUB SERPL-MCNC: 0.61 MG/DL (ref 0.3–1.2)
BUN BLDV-MCNC: 13 MG/DL (ref 6–20)
BUN/CREAT BLD: NORMAL (ref 9–20)
CALCIUM SERPL-MCNC: 9.9 MG/DL (ref 8.6–10.4)
CHLORIDE BLD-SCNC: 100 MMOL/L (ref 98–107)
CO2: 26 MMOL/L (ref 20–31)
CREAT SERPL-MCNC: 0.77 MG/DL (ref 0.5–0.9)
DIFFERENTIAL TYPE: ABNORMAL
EOSINOPHILS RELATIVE PERCENT: 5 % (ref 1–4)
GFR AFRICAN AMERICAN: >60 ML/MIN
GFR NON-AFRICAN AMERICAN: >60 ML/MIN
GFR SERPL CREATININE-BSD FRML MDRD: NORMAL ML/MIN/{1.73_M2}
GFR SERPL CREATININE-BSD FRML MDRD: NORMAL ML/MIN/{1.73_M2}
GLUCOSE BLD-MCNC: 97 MG/DL (ref 70–99)
HCT VFR BLD CALC: 45.2 % (ref 36–46)
HEMOGLOBIN: 15.3 G/DL (ref 12–16)
IMMATURE GRANULOCYTES: ABNORMAL %
LYMPHOCYTES # BLD: 31 % (ref 24–44)
MCH RBC QN AUTO: 30.9 PG (ref 26–34)
MCHC RBC AUTO-ENTMCNC: 33.9 G/DL (ref 31–37)
MCV RBC AUTO: 91.1 FL (ref 80–100)
MONOCYTES # BLD: 9 % (ref 2–11)
NRBC AUTOMATED: ABNORMAL PER 100 WBC
PDW BLD-RTO: 13.5 % (ref 12.5–15.4)
PLATELET # BLD: 205 K/UL (ref 140–450)
PLATELET ESTIMATE: ABNORMAL
PMV BLD AUTO: 7.3 FL (ref 6–12)
POTASSIUM SERPL-SCNC: 4.4 MMOL/L (ref 3.7–5.3)
RBC # BLD: 4.96 M/UL (ref 4–5.2)
RBC # BLD: ABNORMAL 10*6/UL
SEG NEUTROPHILS: 54 % (ref 36–66)
SEGMENTED NEUTROPHILS ABSOLUTE COUNT: 2.8 K/UL (ref 1.8–7.7)
SODIUM BLD-SCNC: 135 MMOL/L (ref 135–144)
TOTAL PROTEIN: 7.7 G/DL (ref 6.4–8.3)
WBC # BLD: 5.1 K/UL (ref 3.5–11)
WBC # BLD: ABNORMAL 10*3/UL

## 2019-11-19 PROCEDURE — 1036F TOBACCO NON-USER: CPT | Performed by: INTERNAL MEDICINE

## 2019-11-19 PROCEDURE — 3017F COLORECTAL CA SCREEN DOC REV: CPT | Performed by: INTERNAL MEDICINE

## 2019-11-19 PROCEDURE — 80053 COMPREHEN METABOLIC PANEL: CPT

## 2019-11-19 PROCEDURE — 36415 COLL VENOUS BLD VENIPUNCTURE: CPT

## 2019-11-19 PROCEDURE — 99214 OFFICE O/P EST MOD 30 MIN: CPT | Performed by: INTERNAL MEDICINE

## 2019-11-19 PROCEDURE — G8427 DOCREV CUR MEDS BY ELIG CLIN: HCPCS | Performed by: INTERNAL MEDICINE

## 2019-11-19 PROCEDURE — 85025 COMPLETE CBC W/AUTO DIFF WBC: CPT

## 2019-11-19 PROCEDURE — 99211 OFF/OP EST MAY X REQ PHY/QHP: CPT | Performed by: INTERNAL MEDICINE

## 2019-11-19 PROCEDURE — G8484 FLU IMMUNIZE NO ADMIN: HCPCS | Performed by: INTERNAL MEDICINE

## 2019-11-19 PROCEDURE — G8419 CALC BMI OUT NRM PARAM NOF/U: HCPCS | Performed by: INTERNAL MEDICINE

## 2019-12-27 RX ORDER — OMEPRAZOLE 20 MG/1
20 CAPSULE, DELAYED RELEASE ORAL DAILY
Qty: 30 CAPSULE | Refills: 2 | Status: SHIPPED | OUTPATIENT
Start: 2019-12-27 | End: 2020-03-27 | Stop reason: SDUPTHER

## 2020-02-28 ENCOUNTER — OFFICE VISIT (OUTPATIENT)
Dept: FAMILY MEDICINE CLINIC | Age: 55
End: 2020-02-28
Payer: MEDICARE

## 2020-02-28 VITALS
SYSTOLIC BLOOD PRESSURE: 144 MMHG | WEIGHT: 161.2 LBS | DIASTOLIC BLOOD PRESSURE: 90 MMHG | HEIGHT: 66 IN | HEART RATE: 69 BPM | TEMPERATURE: 98.7 F | BODY MASS INDEX: 25.91 KG/M2

## 2020-02-28 PROCEDURE — 99214 OFFICE O/P EST MOD 30 MIN: CPT | Performed by: NURSE PRACTITIONER

## 2020-02-28 RX ORDER — LEVOTHYROXINE SODIUM 0.05 MG/1
50 TABLET ORAL DAILY
Qty: 30 TABLET | Refills: 0 | Status: SHIPPED | OUTPATIENT
Start: 2020-02-28 | End: 2020-12-15 | Stop reason: SDUPTHER

## 2020-02-28 RX ORDER — LISINOPRIL 5 MG/1
5 TABLET ORAL DAILY
Qty: 30 TABLET | Refills: 0 | Status: SHIPPED | OUTPATIENT
Start: 2020-02-28 | End: 2020-03-27 | Stop reason: SDUPTHER

## 2020-02-28 ASSESSMENT — PATIENT HEALTH QUESTIONNAIRE - PHQ9
1. LITTLE INTEREST OR PLEASURE IN DOING THINGS: 0
SUM OF ALL RESPONSES TO PHQ9 QUESTIONS 1 & 2: 0
SUM OF ALL RESPONSES TO PHQ QUESTIONS 1-9: 0
SUM OF ALL RESPONSES TO PHQ QUESTIONS 1-9: 0
2. FEELING DOWN, DEPRESSED OR HOPELESS: 0

## 2020-02-28 ASSESSMENT — ENCOUNTER SYMPTOMS
ABDOMINAL PAIN: 1
SORE THROAT: 0
EYE ITCHING: 0
NAUSEA: 0
CONSTIPATION: 0
VISUAL CHANGE: 0
WHEEZING: 0
COUGH: 0
VOMITING: 0
CHANGE IN BOWEL HABIT: 0
BACK PAIN: 0
EYE DISCHARGE: 0
TROUBLE SWALLOWING: 0
SINUS PRESSURE: 0
DIARRHEA: 0
CHEST TIGHTNESS: 0
RHINORRHEA: 0
SHORTNESS OF BREATH: 0

## 2020-02-28 NOTE — PROGRESS NOTES
[address] [facility]   [address]  [phone]    2/28/2020    Rose Cruz is a 47 y.o. female who presents today for her  medical conditions/complaints as noted below. Rose Cruz is c/o of Establish Care and Hypothyroidism  . HPI:     Patient presents today per self. She is in no acute distress. Denies chest pain, shortness of breath, or any recent viral infections. Patient follows with Dr. Violeta Viveros, hematology, for ITP. She sees him every 6 months currently with outstanding lab work of CBC every 6 months. Her last blood work was unremarkable. Diagnosed with acute ITP June 2018. Patient comments that she has had left upper quadrant pain since Sidra time. Is a constant dull ache. Initially in December she commented that she would have sharp pains with movement. However they went away briefly. Patient also comments she has been noticing random bruises again. He recently had a bruise to her hand from the power dryers in public bathrooms. Other   This is a chronic (hypothryriod) problem. The current episode started more than 1 year ago. Associated symptoms include abdominal pain (LUQ) and fatigue. Pertinent negatives include no anorexia, arthralgias, change in bowel habit, chest pain, chills, congestion, coughing, fever, headaches, myalgias, nausea, rash, sore throat, urinary symptoms, visual change or vomiting. Nothing aggravates the symptoms. Treatments tried: Synthroid. The treatment provided moderate relief.        Vitals:    02/28/20 1053   BP: (!) 144/90   Site: Left Upper Arm   Position: Sitting   Cuff Size: Medium Adult   Pulse: 69   Temp: 98.7 °F (37.1 °C)   TempSrc: Tympanic   Weight: 161 lb 3.2 oz (73.1 kg)   Height: 5' 6\" (1.676 m)      Past Medical History:   Diagnosis Date    Acute ITP ([facility] 07/31/2018    Atrial fibrillation (HCC)     Hyperthyroidism     Wears dentures       Past Surgical History:   Procedure Laterality Date     SECTION       Family History   Problem Relation Age of Onset    Hypertension Mother     Thyroid Disease Mother     Colon Cancer Mother 79    Diabetes Father     Heart Attack Father     Breast Cancer Sister 61    High Blood Pressure Brother     Heart Disease Brother     Cancer Brother         liver cancer     Other Brother         MVA    Arthritis Brother     Depression Brother     Alcohol Abuse Brother     Colon Cancer Other 44        nephew - her brother's son     Social History     Tobacco Use    Smoking status: Never Smoker    Smokeless tobacco: Never Used   Substance Use Topics    Alcohol use: Yes     Comment: rare       Current Outpatient Medications   Medication Sig Dispense Refill    levothyroxine (SYNTHROID) 50 MCG tablet Take 1 tablet by mouth Daily 30 tablet 0    lisinopril (PRINIVIL;ZESTRIL) 5 MG tablet Take 1 tablet by mouth daily 30 tablet 0    omeprazole (PRILOSEC) 20 MG delayed release capsule Take 1 capsule by mouth Daily 30 capsule 2    Cholecalciferol (VITAMIN D3) 2000 units CAPS Take 1 capsule by mouth daily      Biotin 5000 MCG CAPS Take 1 capsule by mouth      Potassium 99 MG TABS Take by mouth      Homeopathic Products (CLEARLIFE ALLERGY RELIEF NA) by Nasal route Indications: OTC allergy medication      acetaminophen (TYLENOL) 325 MG tablet Take 650 mg by mouth every 6 hours as needed for Pain       No current facility-administered medications for this visit.       Allergies   Allergen Reactions    Bee Venom Hives    Nuts [Peanut-Containing Drug Products] Hives       Health Maintenance   Topic Date Due    Cervical cancer screen  1986    Breast cancer screen  2005    Shingles Vaccine (1 of 2) 2015    Colon Cancer Screen FIT/FOBT  2019    Potassium monitoring  2020    Creatinine monitoring  2020    Lipid screen  2023    DTaP/Tdap/Td vaccine (2 - Td) 10/31/2023    Flu vaccine  Completed    Hepatitis C screen purpura ([address]  - US ABDOMEN LIMITED; Future  - CBC  Continue to follow care with . 2. Abnormal bruising  - CBC    3. History of ITP  - CBC  - US ABDOMEN LIMITED; Future    4. Hypertension   - Lisinopril 5 mg daily   - Fasting CMP    5. Thyroid enlargement  - TSH With Reflex Ft4; Future  - US THYROID; Future    6. Mixed hyperlipidemia  - lipid panel    7. Breast cancer screening by mammogram  - HENRIQUE DIGITAL SCREEN W CAD BILATERAL; Future    8. Screening for colorectal cancer  - POCT Fecal Immunochemical Test (FIT); Future     We will follow to review CBC results. If platelets low, will notify patient. At that time we will have to follow-up immediately with Dr Pedro Luis Ziegler. Patient agrees with plan of care and voiced understanding. Plan:      Return in about 6 months (around 8/28/2020). Orders Placed This Encounter   Procedures    HENRIQUE DIGITAL SCREEN W CAD BILATERAL     Standing Status:   Future     Standing Expiration Date:   4/29/2021     Order Specific Question:   Reason for exam:     Answer:   screening for breast cancer    US ABDOMEN LIMITED     Standing Status:   Future     Standing Expiration Date:   2/28/2021     Order Specific Question:   Reason for exam:     Answer:   LUQ pain     Order Specific Question:   Specify organ?      Answer:   SPLEEN    US THYROID     Standing Status:   Future     Standing Expiration Date:   2/28/2021     Order Specific Question:   Reason for exam:     Answer:   nodule    TSH With Reflex Ft4     Standing Status:   Future     Standing Expiration Date:   2/28/2021    Lipid Panel     Standing Status:   Future     Standing Expiration Date:   2/28/2021     Order Specific Question:   Is Patient Fasting?/# of Hours     Answer:   yes    CBC With Auto Differential     Standing Status:   Future     Standing Expiration Date:   2/28/2021    Comprehensive Metabolic Panel, Fasting     Standing Status:   Future     Standing Expiration Date:   2/28/2021 Shearon August POCT Fecal

## 2020-02-29 ENCOUNTER — HOSPITAL ENCOUNTER (OUTPATIENT)
Age: 55
Discharge: HOME | End: 2020-02-29
Payer: MEDICARE

## 2020-02-29 LAB
ABSOLUTE EOS #: 0.38 K/UL (ref 0–0.44)
ABSOLUTE IMMATURE GRANULOCYTE: <0.03 K/UL (ref 0–0.3)
ABSOLUTE LYMPH #: 1.61 K/UL (ref 1.1–3.7)
ABSOLUTE MONO #: 0.56 K/UL (ref 0.1–1.2)
ALBUMIN SERPL-MCNC: 4.2 G/DL (ref 3.5–5.2)
ALBUMIN/GLOBULIN RATIO: 1.6 (ref 1–2.5)
ALP BLD-CCNC: 82 U/L (ref 35–104)
ALT SERPL-CCNC: 17 U/L (ref 5–33)
ANION GAP SERPL CALCULATED.3IONS-SCNC: 13 MMOL/L (ref 9–17)
AST SERPL-CCNC: 19 U/L
BASOPHILS # BLD: 1 % (ref 0–2)
BASOPHILS ABSOLUTE: 0.03 K/UL (ref 0–0.2)
BILIRUB SERPL-MCNC: 0.66 MG/DL (ref 0.3–1.2)
BUN BLDV-MCNC: 15 MG/DL (ref 6–20)
BUN/CREAT BLD: NORMAL (ref 9–20)
CALCIUM SERPL-MCNC: 9.8 MG/DL (ref 8.6–10.4)
CHLORIDE BLD-SCNC: 104 MMOL/L (ref 98–107)
CHOLESTEROL/HDL RATIO: 3.2
CHOLESTEROL: 199 MG/DL
CO2: 23 MMOL/L (ref 20–31)
CREAT SERPL-MCNC: 0.63 MG/DL (ref 0.5–0.9)
DIFFERENTIAL TYPE: ABNORMAL
EOSINOPHILS RELATIVE PERCENT: 8 % (ref 1–4)
GFR AFRICAN AMERICAN: >60 ML/MIN
GFR NON-AFRICAN AMERICAN: >60 ML/MIN
GFR SERPL CREATININE-BSD FRML MDRD: NORMAL ML/MIN/{1.73_M2}
GFR SERPL CREATININE-BSD FRML MDRD: NORMAL ML/MIN/{1.73_M2}
GLUCOSE FASTING: 96 MG/DL (ref 70–99)
HCT VFR BLD CALC: 45.1 % (ref 36.3–47.1)
HDLC SERPL-MCNC: 62 MG/DL
HEMOGLOBIN: 14.8 G/DL (ref 11.9–15.1)
IMMATURE GRANULOCYTES: 0 %
LDL CHOLESTEROL: 120 MG/DL (ref 0–130)
LYMPHOCYTES # BLD: 32 % (ref 24–43)
MCH RBC QN AUTO: 29.8 PG (ref 25.2–33.5)
MCHC RBC AUTO-ENTMCNC: 32.8 G/DL (ref 28.4–34.8)
MCV RBC AUTO: 90.7 FL (ref 82.6–102.9)
MONOCYTES # BLD: 11 % (ref 3–12)
NRBC AUTOMATED: 0 PER 100 WBC
PDW BLD-RTO: 13.7 % (ref 11.8–14.4)
PLATELET # BLD: 183 K/UL (ref 138–453)
PLATELET ESTIMATE: ABNORMAL
PMV BLD AUTO: 9.6 FL (ref 8.1–13.5)
POTASSIUM SERPL-SCNC: 4.1 MMOL/L (ref 3.7–5.3)
RBC # BLD: 4.97 M/UL (ref 3.95–5.11)
RBC # BLD: ABNORMAL 10*6/UL
SEG NEUTROPHILS: 48 % (ref 36–65)
SEGMENTED NEUTROPHILS ABSOLUTE COUNT: 2.45 K/UL (ref 1.5–8.1)
SODIUM BLD-SCNC: 140 MMOL/L (ref 135–144)
THYROXINE, FREE: 1.94 NG/DL (ref 0.93–1.7)
TOTAL PROTEIN: 6.9 G/DL (ref 6.4–8.3)
TRIGL SERPL-MCNC: 83 MG/DL
TSH SERPL DL<=0.05 MIU/L-ACNC: 0.01 MIU/L (ref 0.3–5)
VLDLC SERPL CALC-MCNC: NORMAL MG/DL (ref 1–30)
WBC # BLD: 5 K/UL (ref 3.5–11.3)
WBC # BLD: ABNORMAL 10*3/UL

## 2020-02-29 PROCEDURE — 85025 COMPLETE CBC W/AUTO DIFF WBC: CPT

## 2020-02-29 PROCEDURE — 80061 LIPID PANEL: CPT

## 2020-02-29 PROCEDURE — 84439 ASSAY OF FREE THYROXINE: CPT

## 2020-02-29 PROCEDURE — 36415 COLL VENOUS BLD VENIPUNCTURE: CPT

## 2020-02-29 PROCEDURE — 80053 COMPREHEN METABOLIC PANEL: CPT

## 2020-02-29 PROCEDURE — 84443 ASSAY THYROID STIM HORMONE: CPT

## 2020-03-03 ENCOUNTER — HOSPITAL ENCOUNTER (OUTPATIENT)
Dept: ULTRASOUND IMAGING | Facility: CLINIC | Age: 55
Discharge: HOME | End: 2020-03-05
Payer: MEDICARE

## 2020-03-03 PROCEDURE — 76536 US EXAM OF HEAD AND NECK: CPT

## 2020-03-03 PROCEDURE — 76705 ECHO EXAM OF ABDOMEN: CPT

## 2020-03-27 DIAGNOSIS — E04.9 THYROID ENLARGEMENT: ICD-10-CM

## 2020-03-27 DIAGNOSIS — I10 ESSENTIAL HYPERTENSION: ICD-10-CM

## 2020-03-27 NOTE — TELEPHONE ENCOUNTER
LOV 2/28/20  RTO 6 months; F/U scheduled  LRF 2/28/20    Health Maintenance   Topic Date Due    Cervical cancer screen  08/12/1986    Breast cancer screen  08/12/2005    Shingles Vaccine (1 of 2) 08/12/2015    Colon Cancer Screen FIT/FOBT  07/30/2019    Potassium monitoring  02/28/2021    Creatinine monitoring  02/28/2021    DTaP/Tdap/Td vaccine (2 - Td) 10/31/2023    Lipid screen  02/28/2025    Flu vaccine  Completed    Hepatitis C screen  Completed    HIV screen  Completed    Hepatitis A vaccine  Aged Out    Hepatitis B vaccine  Aged Out    Hib vaccine  Aged Out    Meningococcal (ACWY) vaccine  Aged Out    Pneumococcal 0-64 years Vaccine  Aged Out             (applicable per patient's age: Cancer Screenings, Depression Screening, Fall Risk Screening, Immunizations)    LDL Cholesterol (mg/dL)   Date Value   02/29/2020 120     AST (U/L)   Date Value   02/29/2020 19     ALT (U/L)   Date Value   02/29/2020 17     BUN (mg/dL)   Date Value   02/29/2020 15      (goal A1C is < 7)   (goal LDL is <100) need 30-50% reduction from baseline     BP Readings from Last 3 Encounters:   02/28/20 (!) 144/90   11/19/19 (!) 166/100   08/13/19 (!) 175/85    (goal /80)      All Future Testing planned in [facility]:  Lab Frequency Next Occurrence   HENRIQUE DIGITAL SCREEN W CAD BILATERAL Once 03/29/2020   POCT Fecal Immunochemical Test (FIT) Once 03/20/2020   TSH Once 04/10/2020   T4 Once 04/10/2020   T3 Once 04/10/2020   Thyroid Antibodies Once 04/10/2020       Next Visit Date:  Future Appointments   Date Time Provider Leon Light   4/22/2020  9:00 AM JAYLENE Gonzalez - CNP [facility]   5/19/2020 10:45 AM Chayo Auguste MD [address] [place] [address]            Patient Active Problem List:     Chronic non-seasonal allergic rhinitis     Wears dentures     Family history of breast cancer in sister     Abnormal bruising     Acute ITP (HCC)     Thyroid enlargement     History of ITP

## 2020-03-30 RX ORDER — LEVOTHYROXINE SODIUM 0.05 MG/1
50 TABLET ORAL DAILY
Qty: 30 TABLET | Refills: 0 | OUTPATIENT
Start: 2020-03-30 | End: 2021-03-30

## 2020-03-30 RX ORDER — LISINOPRIL 5 MG/1
5 TABLET ORAL DAILY
Qty: 30 TABLET | Refills: 5 | Status: SHIPPED | OUTPATIENT
Start: 2020-03-30 | End: 2020-09-20 | Stop reason: SDUPTHER

## 2020-03-30 RX ORDER — OMEPRAZOLE 20 MG/1
20 CAPSULE, DELAYED RELEASE ORAL DAILY
Qty: 30 CAPSULE | Refills: 2 | Status: SHIPPED | OUTPATIENT
Start: 2020-03-30 | End: 2020-06-25 | Stop reason: SDUPTHER

## 2020-03-30 NOTE — TELEPHONE ENCOUNTER
LOV: 2/28/20  LRF: 2/28/20    NA: 4/22/20    Health Maintenance   Topic Date Due    Cervical cancer screen  08/12/1986    Breast cancer screen  08/12/2005    Shingles Vaccine (1 of 2) 08/12/2015    Colon Cancer Screen FIT/FOBT  07/30/2019    Potassium monitoring  02/28/2021    Creatinine monitoring  02/28/2021    DTaP/Tdap/Td vaccine (2 - Td) 10/31/2023    Lipid screen  02/28/2025    Flu vaccine  Completed    Hepatitis C screen  Completed    HIV screen  Completed    Hepatitis A vaccine  Aged Out    Hepatitis B vaccine  Aged Out    Hib vaccine  Aged Out    Meningococcal (ACWY) vaccine  Aged Out    Pneumococcal 0-64 years Vaccine  Aged Out             (applicable per patient's age: Cancer Screenings, Depression Screening, Fall Risk Screening, Immunizations)    LDL Cholesterol (mg/dL)   Date Value   02/29/2020 120     AST (U/L)   Date Value   02/29/2020 19     ALT (U/L)   Date Value   02/29/2020 17     BUN (mg/dL)   Date Value   02/29/2020 15      (goal A1C is < 7)   (goal LDL is <100) need 30-50% reduction from baseline     BP Readings from Last 3 Encounters:   02/28/20 (!) 144/90   11/19/19 (!) 166/100   08/13/19 (!) 175/85    (goal /80)      All Future Testing planned in [facility]:  Lab Frequency Next Occurrence   HENRIQUE DIGITAL SCREEN W CAD BILATERAL Once 03/29/2020   POCT Fecal Immunochemical Test (FIT) Once 03/20/2020   TSH Once 04/10/2020   T4 Once 04/10/2020   T3 Once 04/10/2020   Thyroid Antibodies Once 04/10/2020       Next Visit Date:  Future Appointments   Date Time Provider Leon Light   4/22/2020  9:00 AM JAYLENE Elliott - CNP [place] [facility]   5/19/2020 10:45 AM Francisco Aparicio MD [address] [place] [address]            Patient Active Problem List:     Chronic non-seasonal allergic rhinitis     Wears dentures     Family history of breast cancer in sister     Abnormal bruising     Acute ITP (HCC)     Thyroid enlargement     History of ITP

## 2020-04-03 RX ORDER — LEVOTHYROXINE SODIUM 0.05 MG/1
50 TABLET ORAL DAILY
Qty: 30 TABLET | Refills: 0 | OUTPATIENT
Start: 2020-04-03 | End: 2020-05-03

## 2020-04-03 RX ORDER — LISINOPRIL 5 MG/1
5 TABLET ORAL DAILY
Qty: 30 TABLET | Refills: 5 | OUTPATIENT
Start: 2020-04-03 | End: 2020-09-30

## 2020-04-03 NOTE — TELEPHONE ENCOUNTER
Patient stated she was not taking as recommended. She states she actually feels  better off the medication.

## 2020-04-03 NOTE — TELEPHONE ENCOUNTER
Patient's lisinopril which is sent to pharmacy on 3/30/2020. Patient Synthroid will be renewed, if applicable, after she completes her blood work in the next few weeks.

## 2020-04-16 ENCOUNTER — TELEPHONE (OUTPATIENT)
Dept: FAMILY MEDICINE CLINIC | Age: 55
End: 2020-04-16

## 2020-04-16 LAB
ANTI-THYROGLOB ABS: 2 IU/ML
T3 TOTAL: 106 NG/DL (ref 87–178)
T4 TOTAL: 7.2 UG/DL (ref 6.1–12.2)
THYROID PEROXIDASE ANTIBODY: 1138 IU/ML
TSH SERPL DL<=0.05 MIU/L-ACNC: 3.63 UIU/ML (ref 0.49–4.67)

## 2020-04-17 RX ORDER — LEVOTHYROXINE SODIUM 0.05 MG/1
50 TABLET ORAL DAILY
Qty: 90 TABLET | Refills: 0 | Status: SHIPPED | OUTPATIENT
Start: 2020-04-17 | End: 2020-07-24

## 2020-04-23 ENCOUNTER — TELEMEDICINE (OUTPATIENT)
Dept: FAMILY MEDICINE CLINIC | Age: 55
End: 2020-04-23
Payer: MEDICARE

## 2020-04-23 PROBLEM — R53.83 FATIGUE: Status: ACTIVE | Noted: 2020-04-23

## 2020-04-23 PROBLEM — Z86.2 PERSONAL HISTORY OF DISEASES OF THE BLOOD AND BLOOD-FORMING ORGANS AND CERTAIN DISORDERS INVOLVING THE IMMUNE MECHANISM: Status: ACTIVE | Noted: 2019-01-29

## 2020-04-23 PROBLEM — I10 ESSENTIAL HYPERTENSION: Status: ACTIVE | Noted: 2020-04-23

## 2020-04-23 PROBLEM — E03.8 HYPOTHYROIDISM DUE TO HASHIMOTO'S THYROIDITIS: Status: ACTIVE | Noted: 2020-04-23

## 2020-04-23 PROBLEM — E78.2 MIXED HYPERLIPIDEMIA: Status: ACTIVE | Noted: 2020-04-23

## 2020-04-23 PROBLEM — E06.3 HYPOTHYROIDISM DUE TO HASHIMOTO'S THYROIDITIS: Status: ACTIVE | Noted: 2020-04-23

## 2020-04-23 PROCEDURE — 99213 OFFICE O/P EST LOW 20 MIN: CPT | Performed by: NURSE PRACTITIONER

## 2020-04-23 SDOH — ECONOMIC STABILITY: INCOME INSECURITY: HOW HARD IS IT FOR YOU TO PAY FOR THE VERY BASICS LIKE FOOD, HOUSING, MEDICAL CARE, AND HEATING?: VERY HARD

## 2020-04-23 SDOH — ECONOMIC STABILITY: FOOD INSECURITY: WITHIN THE PAST 12 MONTHS, THE FOOD YOU BOUGHT JUST DIDN'T LAST AND YOU DIDN'T HAVE MONEY TO GET MORE.: NEVER TRUE

## 2020-04-23 SDOH — ECONOMIC STABILITY: FOOD INSECURITY: WITHIN THE PAST 12 MONTHS, YOU WORRIED THAT YOUR FOOD WOULD RUN OUT BEFORE YOU GOT MONEY TO BUY MORE.: NEVER TRUE

## 2020-04-23 ASSESSMENT — ENCOUNTER SYMPTOMS
SHORTNESS OF BREATH: 0
COUGH: 0
WHEEZING: 0
GASTROINTESTINAL NEGATIVE: 1
CHEST TIGHTNESS: 0

## 2020-05-19 ENCOUNTER — OFFICE VISIT (OUTPATIENT)
Dept: ONCOLOGY | Age: 55
End: 2020-05-19
Payer: MEDICARE

## 2020-05-19 ENCOUNTER — HOSPITAL ENCOUNTER (OUTPATIENT)
Age: 55
Discharge: HOME | End: 2020-05-19
Payer: MEDICARE

## 2020-05-19 ENCOUNTER — TELEPHONE (OUTPATIENT)
Dept: ONCOLOGY | Age: 55
End: 2020-05-19

## 2020-05-19 VITALS
BODY MASS INDEX: 26.05 KG/M2 | SYSTOLIC BLOOD PRESSURE: 167 MMHG | WEIGHT: 161.4 LBS | DIASTOLIC BLOOD PRESSURE: 83 MMHG | TEMPERATURE: 98 F | HEART RATE: 60 BPM | RESPIRATION RATE: 16 BRPM

## 2020-05-19 DIAGNOSIS — D69.3 ACUTE ITP (HCC): ICD-10-CM

## 2020-05-19 DIAGNOSIS — R53.83 FATIGUE, UNSPECIFIED TYPE: ICD-10-CM

## 2020-05-19 LAB
ABSOLUTE EOS #: 0.3 K/UL (ref 0–0.4)
ABSOLUTE IMMATURE GRANULOCYTE: ABNORMAL K/UL (ref 0–0.3)
ABSOLUTE LYMPH #: 1.7 K/UL (ref 1–4.8)
ABSOLUTE MONO #: 0.4 K/UL (ref 0.1–1.2)
ALBUMIN SERPL-MCNC: 4.6 G/DL (ref 3.5–5.2)
ALBUMIN/GLOBULIN RATIO: 1.8 (ref 1–2.5)
ALP BLD-CCNC: 83 U/L (ref 35–104)
ALT SERPL-CCNC: 13 U/L (ref 5–33)
ANION GAP SERPL CALCULATED.3IONS-SCNC: 11 MMOL/L (ref 9–17)
AST SERPL-CCNC: 18 U/L
BASOPHILS # BLD: 1 % (ref 0–2)
BASOPHILS ABSOLUTE: 0 K/UL (ref 0–0.2)
BILIRUB SERPL-MCNC: 0.41 MG/DL (ref 0.3–1.2)
BUN BLDV-MCNC: 12 MG/DL (ref 6–20)
BUN/CREAT BLD: NORMAL (ref 9–20)
CALCIUM SERPL-MCNC: 9.5 MG/DL (ref 8.6–10.4)
CHLORIDE BLD-SCNC: 102 MMOL/L (ref 98–107)
CO2: 26 MMOL/L (ref 20–31)
CREAT SERPL-MCNC: 0.74 MG/DL (ref 0.5–0.9)
DIFFERENTIAL TYPE: ABNORMAL
EOSINOPHILS RELATIVE PERCENT: 6 % (ref 1–4)
FOLATE: 9.5 NG/ML
GFR AFRICAN AMERICAN: >60 ML/MIN
GFR NON-AFRICAN AMERICAN: >60 ML/MIN
GFR SERPL CREATININE-BSD FRML MDRD: NORMAL ML/MIN/{1.73_M2}
GFR SERPL CREATININE-BSD FRML MDRD: NORMAL ML/MIN/{1.73_M2}
GLUCOSE BLD-MCNC: 96 MG/DL (ref 70–99)
HCT VFR BLD CALC: 44.7 % (ref 36–46)
HEMOGLOBIN: 14.9 G/DL (ref 12–16)
IMMATURE GRANULOCYTES: ABNORMAL %
LYMPHOCYTES # BLD: 34 % (ref 24–44)
MCH RBC QN AUTO: 30.1 PG (ref 26–34)
MCHC RBC AUTO-ENTMCNC: 33.4 G/DL (ref 31–37)
MCV RBC AUTO: 90.3 FL (ref 80–100)
MONOCYTES # BLD: 8 % (ref 2–11)
NRBC AUTOMATED: ABNORMAL PER 100 WBC
PDW BLD-RTO: 14.8 % (ref 12.5–15.4)
PLATELET # BLD: 202 K/UL (ref 140–450)
PLATELET ESTIMATE: ABNORMAL
PMV BLD AUTO: 7.3 FL (ref 6–12)
POTASSIUM SERPL-SCNC: 4.2 MMOL/L (ref 3.7–5.3)
RBC # BLD: 4.95 M/UL (ref 4–5.2)
RBC # BLD: ABNORMAL 10*6/UL
SEG NEUTROPHILS: 51 % (ref 36–66)
SEGMENTED NEUTROPHILS ABSOLUTE COUNT: 2.5 K/UL (ref 1.8–7.7)
SODIUM BLD-SCNC: 139 MMOL/L (ref 135–144)
TOTAL PROTEIN: 7.2 G/DL (ref 6.4–8.3)
VITAMIN B-12: 409 PG/ML (ref 232–1245)
VITAMIN D 25-HYDROXY: 42 NG/ML (ref 30–100)
WBC # BLD: 5 K/UL (ref 3.5–11)
WBC # BLD: ABNORMAL 10*3/UL

## 2020-05-19 PROCEDURE — G8419 CALC BMI OUT NRM PARAM NOF/U: HCPCS | Performed by: INTERNAL MEDICINE

## 2020-05-19 PROCEDURE — 36415 COLL VENOUS BLD VENIPUNCTURE: CPT

## 2020-05-19 PROCEDURE — 82306 VITAMIN D 25 HYDROXY: CPT

## 2020-05-19 PROCEDURE — 99211 OFF/OP EST MAY X REQ PHY/QHP: CPT | Performed by: INTERNAL MEDICINE

## 2020-05-19 PROCEDURE — 1036F TOBACCO NON-USER: CPT | Performed by: INTERNAL MEDICINE

## 2020-05-19 PROCEDURE — 82607 VITAMIN B-12: CPT

## 2020-05-19 PROCEDURE — G8427 DOCREV CUR MEDS BY ELIG CLIN: HCPCS | Performed by: INTERNAL MEDICINE

## 2020-05-19 PROCEDURE — 99214 OFFICE O/P EST MOD 30 MIN: CPT | Performed by: INTERNAL MEDICINE

## 2020-05-19 PROCEDURE — 85025 COMPLETE CBC W/AUTO DIFF WBC: CPT

## 2020-05-19 PROCEDURE — 82746 ASSAY OF FOLIC ACID SERUM: CPT

## 2020-05-19 PROCEDURE — 80053 COMPREHEN METABOLIC PANEL: CPT

## 2020-05-19 PROCEDURE — 3017F COLORECTAL CA SCREEN DOC REV: CPT | Performed by: INTERNAL MEDICINE

## 2020-05-19 NOTE — PROGRESS NOTES
DIAGNOSIS:   1. Severe thrombocytopenia, likely ITP with petechia and bruising, no active bleeding, steroid dependent  2. Hashimoto thyroiditis     CURRENT THERAPY:  1. Start steroids course (high dose dex)  2018 - good initial response  2. Transition to Rituxan, with excellent response       BRIEF CASE HISTORY:  Eneida Matthew is a very pleasant 47 y.o. female who is referred to us for thrombocytopenia. She recently had CBC via PCP which showed PLT count at 5. She has no other history of lab work. She notes she has petechia ecchymosis which started about a year ago and has been bruising easily over the last weeks. She was on steroids for shoulder dislocation a few moths ago and had GI distress. She reports the bruising started after that. She had hypothyroidism and takes Synthroid. She has not started new medication recently and does not take any blood thinners or pain medication. She has no history of stroke, heart attack, previous blood disorders, diabetes, or cancer. She does not drink alcohol or smoke cigarettes. Started on steroids 2018 - good initial response. She was found to be steroid dependent, plan for Rituxan. Excellent response. She had persistent bone pain and bone marrow biopsy was done and was negative. Her nephew was diagnosed with ITP 2019 at age 55.    she was later diagnosed with hashimoto thyroiditis. INTERIM HISTORY: The patient presents for follow up for ITP and to review lab work. She feels well with no new symptoms. She has US of thyroid and spleen via PCP, possible Hashimoto's disease, she is getting referral to endocrinology. She has family history of Hashimoto's. PAST MEDICAL HISTORY: has a past medical history of Acute ITP ([address] Atrial fibrillation ([address] Hyperthyroidism, and Wears dentures. PAST SURGICAL HISTORY: has a past surgical history that includes  section ().      CURRENT MEDICATIONS:  has a current medication list which

## 2020-06-25 RX ORDER — OMEPRAZOLE 20 MG/1
20 CAPSULE, DELAYED RELEASE ORAL DAILY
Qty: 30 CAPSULE | Refills: 2 | Status: SHIPPED | OUTPATIENT
Start: 2020-06-25 | End: 2020-09-20 | Stop reason: SDUPTHER

## 2020-09-21 RX ORDER — LISINOPRIL 5 MG/1
5 TABLET ORAL DAILY
Qty: 30 TABLET | Refills: 5 | Status: SHIPPED
Start: 2020-09-21 | End: 2020-12-15

## 2020-09-21 RX ORDER — OMEPRAZOLE 20 MG/1
20 CAPSULE, DELAYED RELEASE ORAL DAILY
Qty: 30 CAPSULE | Refills: 2 | Status: SHIPPED | OUTPATIENT
Start: 2020-09-21 | End: 2020-12-21

## 2020-09-21 NOTE — TELEPHONE ENCOUNTER
LV 4/23/20  LRF 3/30/20  RTO 11/17/20    Health Maintenance   Topic Date Due    Cervical cancer screen  08/12/1986    Breast cancer screen  08/12/2005    Shingles Vaccine (1 of 2) 08/12/2015    Colon Cancer Screen FIT/FOBT  07/30/2019    Flu vaccine (1) 09/01/2020    TSH testing  04/16/2021    Potassium monitoring  05/19/2021    Creatinine monitoring  05/19/2021    DTaP/Tdap/Td vaccine (2 - Td) 10/31/2023    Lipid screen  02/28/2025    Hepatitis C screen  Completed    HIV screen  Completed    Hepatitis A vaccine  Aged Out    Hepatitis B vaccine  Aged Out    Hib vaccine  Aged Out    Meningococcal (ACWY) vaccine  Aged Out    Pneumococcal 0-64 years Vaccine  Aged Out             (applicable per patient's age: Cancer Screenings, Depression Screening, Fall Risk Screening, Immunizations)    LDL Cholesterol (mg/dL)   Date Value   02/29/2020 120     AST (U/L)   Date Value   05/19/2020 18     ALT (U/L)   Date Value   05/19/2020 13     BUN (mg/dL)   Date Value   05/19/2020 12      (goal A1C is < 7)   (goal LDL is <100) need 30-50% reduction from baseline     BP Readings from Last 3 Encounters:   05/19/20 (!) 167/83   02/28/20 (!) 144/90   11/19/19 (!) 166/100    (goal /80)      All Future Testing planned in [facility]:  Lab Frequency Next Occurrence   HENRIQUE DIGITAL SCREEN W CAD BILATERAL Once 03/29/2020   POCT Fecal Immunochemical Test (FIT) Once 03/20/2020   TSH Once 04/10/2020   T4 Once 04/10/2020   T3 Once 04/10/2020   Thyroid Antibodies Once 04/10/2020       Next Visit Date:  Future Appointments   Date Time Provider Leon Light   11/17/2020 10:00 AM Rosa Cheema  [facility]            Patient Active Problem List:     Chronic non-seasonal allergic rhinitis     Wears dentures     Family history of breast cancer in sister     Abnormal bruising     Acute ITP (HCC)     Thyroid enlargement     History of ITP     Mixed hyperlipidemia     Essential hypertension     Personal history of diseases of the blood and blood-forming organs and certain disorders involving the immune mechanism     Hypothyroidism due to Hashimoto's thyroiditis     Fatigue

## 2020-11-17 ENCOUNTER — OFFICE VISIT (OUTPATIENT)
Dept: ONCOLOGY | Age: 55
End: 2020-11-17
Payer: MEDICARE

## 2020-11-17 ENCOUNTER — HOSPITAL ENCOUNTER (OUTPATIENT)
Age: 55
Discharge: HOME | End: 2020-11-17
Payer: MEDICARE

## 2020-11-17 ENCOUNTER — TELEPHONE (OUTPATIENT)
Dept: ONCOLOGY | Age: 55
End: 2020-11-17

## 2020-11-17 VITALS
RESPIRATION RATE: 16 BRPM | BODY MASS INDEX: 26.53 KG/M2 | TEMPERATURE: 98.1 F | DIASTOLIC BLOOD PRESSURE: 93 MMHG | HEART RATE: 59 BPM | SYSTOLIC BLOOD PRESSURE: 167 MMHG | WEIGHT: 164.4 LBS

## 2020-11-17 DIAGNOSIS — Z86.39 HISTORY OF HYPERTHYROIDISM: ICD-10-CM

## 2020-11-17 DIAGNOSIS — R53.83 FATIGUE, UNSPECIFIED TYPE: ICD-10-CM

## 2020-11-17 DIAGNOSIS — E04.9 THYROID ENLARGEMENT: ICD-10-CM

## 2020-11-17 LAB
ABSOLUTE EOS #: 0.4 K/UL (ref 0–0.4)
ABSOLUTE IMMATURE GRANULOCYTE: ABNORMAL K/UL (ref 0–0.3)
ABSOLUTE LYMPH #: 1.7 K/UL (ref 1–4.8)
ABSOLUTE MONO #: 0.4 K/UL (ref 0.1–1.2)
BASOPHILS # BLD: 1 % (ref 0–2)
BASOPHILS ABSOLUTE: 0 K/UL (ref 0–0.2)
DIFFERENTIAL TYPE: ABNORMAL
EOSINOPHILS RELATIVE PERCENT: 7 % (ref 1–4)
HCT VFR BLD CALC: 43.3 % (ref 36–46)
HEMOGLOBIN: 14.8 G/DL (ref 12–16)
IMMATURE GRANULOCYTES: ABNORMAL %
LYMPHOCYTES # BLD: 33 % (ref 24–44)
MCH RBC QN AUTO: 31.4 PG (ref 26–34)
MCHC RBC AUTO-ENTMCNC: 34.2 G/DL (ref 31–37)
MCV RBC AUTO: 91.7 FL (ref 80–100)
MONOCYTES # BLD: 8 % (ref 2–11)
NRBC AUTOMATED: ABNORMAL PER 100 WBC
PDW BLD-RTO: 14.5 % (ref 12.5–15.4)
PLATELET # BLD: 197 K/UL (ref 140–450)
PLATELET ESTIMATE: ABNORMAL
PMV BLD AUTO: 7.2 FL (ref 6–12)
RBC # BLD: 4.72 M/UL (ref 4–5.2)
RBC # BLD: ABNORMAL 10*6/UL
SEG NEUTROPHILS: 51 % (ref 36–66)
SEGMENTED NEUTROPHILS ABSOLUTE COUNT: 2.7 K/UL (ref 1.8–7.7)
TSH SERPL DL<=0.05 MIU/L-ACNC: 3.81 MIU/L (ref 0.3–5)
WBC # BLD: 5.2 K/UL (ref 3.5–11)
WBC # BLD: ABNORMAL 10*3/UL

## 2020-11-17 PROCEDURE — 84436 ASSAY OF TOTAL THYROXINE: CPT

## 2020-11-17 PROCEDURE — G8427 DOCREV CUR MEDS BY ELIG CLIN: HCPCS | Performed by: INTERNAL MEDICINE

## 2020-11-17 PROCEDURE — G8484 FLU IMMUNIZE NO ADMIN: HCPCS | Performed by: INTERNAL MEDICINE

## 2020-11-17 PROCEDURE — 86376 MICROSOMAL ANTIBODY EACH: CPT

## 2020-11-17 PROCEDURE — 1036F TOBACCO NON-USER: CPT | Performed by: INTERNAL MEDICINE

## 2020-11-17 PROCEDURE — 84480 ASSAY TRIIODOTHYRONINE (T3): CPT

## 2020-11-17 PROCEDURE — G8419 CALC BMI OUT NRM PARAM NOF/U: HCPCS | Performed by: INTERNAL MEDICINE

## 2020-11-17 PROCEDURE — 36415 COLL VENOUS BLD VENIPUNCTURE: CPT

## 2020-11-17 PROCEDURE — 99214 OFFICE O/P EST MOD 30 MIN: CPT | Performed by: INTERNAL MEDICINE

## 2020-11-17 PROCEDURE — 99211 OFF/OP EST MAY X REQ PHY/QHP: CPT | Performed by: INTERNAL MEDICINE

## 2020-11-17 PROCEDURE — 86800 THYROGLOBULIN ANTIBODY: CPT

## 2020-11-17 PROCEDURE — 84443 ASSAY THYROID STIM HORMONE: CPT

## 2020-11-17 PROCEDURE — 3017F COLORECTAL CA SCREEN DOC REV: CPT | Performed by: INTERNAL MEDICINE

## 2020-11-17 PROCEDURE — 85025 COMPLETE CBC W/AUTO DIFF WBC: CPT

## 2020-11-17 NOTE — PROGRESS NOTES
DIAGNOSIS:   1. Severe thrombocytopenia, likely ITP with petechia and bruising, no active bleeding, steroid dependent  2. Hashimoto thyroiditis     CURRENT THERAPY:  1. Start steroids course (high dose dex)  2018 - good initial response  2. Transition to Rituxan, with excellent response       BRIEF CASE HISTORY:  Rodney Richard is a very pleasant 54 y.o. female who is referred to us for thrombocytopenia. She recently had CBC via PCP which showed PLT count at 5. She has no other history of lab work. She notes she has petechia ecchymosis which started about a year ago and has been bruising easily over the last weeks. She was on steroids for shoulder dislocation a few moths ago and had GI distress. She reports the bruising started after that. She had hypothyroidism and takes Synthroid. She has not started new medication recently and does not take any blood thinners or pain medication. She has no history of stroke, heart attack, previous blood disorders, diabetes, or cancer. She does not drink alcohol or smoke cigarettes. Started on steroids 2018 - good initial response. She was found to be steroid dependent, plan for Rituxan. Excellent response. She had persistent bone pain and bone marrow biopsy was done and was negative. Her nephew was diagnosed with ITP 2019 at age 55. She was later diagnosed with hashimoto thyroiditis. INTERIM HISTORY: The patient presents for follow up for ITP and to review lab work. She is doing well with no new concerns or complaints. PAST MEDICAL HISTORY: has a past medical history of Acute ITP ([address] Atrial fibrillation ([address] Hyperthyroidism, and Wears dentures. PAST SURGICAL HISTORY: has a past surgical history that includes  section ().      CURRENT MEDICATIONS:  has a current medication list which includes the following prescription(s): lisinopril, omeprazole, levothyroxine, levothyroxine, vitamin d3, biotin, potassium, homeopathic products, and acetaminophen. ALLERGIES:  is allergic to bee venom and nuts [peanut-containing drug products]. FAMILY HISTORY: Mother and nephew: colon cancer; Aunt: bladder cancer; mother, aunt, 2 nieces with Hashimoto's; nephew ITP     SOCIAL HISTORY:  reports that she has never smoked. She has never used smokeless tobacco. She reports current alcohol use. She reports that she does not use drugs. REVIEW OF SYSTEMS:   General: No fever or night sweats. Weight is stable. ENT: No double or blurred vision, no tinnitus or hearing problem, no dysphagia or sore throat   Respiratory: No chest pain, no shortness of breath, no cough or hemoptysis. Cardiovascular: Denies chest pain, PND or orthopnea. No L E swelling or palpitations. Gastrointestinal:  No nausea or vomiting, abd pain, diarrhea or constipation. Genitourinary: Denies dysuria, hematuria, frequency, urgency or incontinence. Neurological: Denies headaches, decreased LOC, no sensory or motor focal deficits. Musculoskeletal:  No arthralgia no back pain or joint swelling. Skin: There are no rashes or bleeding. Psychiatric:  No anxiety, no depression. Endocrine: No diabetes or thyroid disease. Hematologic: No bleeding, no adenopathy. PHYSICAL EXAM: Shows a well appearing 54y.o.-year-old female who is not in pain or distress. Vital Signs: Blood pressure (!) 167/93, pulse 59, temperature 98.1 °F (36.7 °C), temperature source Oral, resp. rate 16, weight 164 lb 6.4 oz (74.6 kg), not currently breastfeeding. HEENT: Normocephalic and atraumatic. Pupils are equal, round, reactive to light and accommodation. Extraocular muscles are intact. Neck: Showed no JVD, no carotid bruit . Lungs: Clear to auscultation bilaterally. Heart: Regular without any murmur. Abdomen: Soft, mild tenderness LUQ. No hepatosplenomegaly. Extremities: No point tenderness in knee or hip. Lower extremities show no edema, clubbing, or cyanosis.  Breasts: Examination not done today. Neuro exam: intact cranial nerves bilaterally no motor or sensory deficit, gait is normal. Lymphatic: no adenopathy appreciated in the supraclavicular, axillary, cervical or inguinal area Skin: petechia and bruising; bone marrow site is well healed    REVIEW OF LABORATORY DATA:   Lab Results   Component Value Date    WBC 5.2 11/17/2020    HGB 14.8 11/17/2020    HCT 43.3 11/17/2020    MCV 91.7 11/17/2020     11/17/2020           Chemistry        Component Value Date/Time     05/19/2020 1028    K 4.2 05/19/2020 1028     05/19/2020 1028    CO2 26 05/19/2020 1028    BUN 12 05/19/2020 1028    CREATININE 0.74 05/19/2020 1028        Component Value Date/Time    CALCIUM 9.5 05/19/2020 1028    ALKPHOS 83 05/19/2020 1028    AST 18 05/19/2020 1028    ALT 13 05/19/2020 1028    BILITOT 0.41 05/19/2020 1028          PATHOLOGY:     REVIEW OF RADIOLOGICAL RESULTS:     IMPRESSION:   1. ITP, steroid refractory  2. Started 4 day course of steroids - good initial response. 3. Steroid dependent, plan to transition to Rituxan - excellent response  4. Persistent bone pain, bone marrow was done, negative, ITP active but controlled  5. Hashimoto thyroiditis, on synthroid     PLAN:   1. We reviewed her lab work which shows stable PLT in range, all other counts in range. 2. She is doing well with no symptoms. 3. I discussed ongoing follow up with her stable disease and we will commence with yearly surveillance. 4. Return 10/2021 with repeat lab work.

## 2020-11-17 NOTE — PROGRESS NOTES
DIAGNOSIS:   1. Severe thrombocytopenia, likely ITP with petechia and bruising, no active bleeding, steroid dependent  2. Hashimoto thyroiditis     CURRENT THERAPY:  1. Start steroids course (high dose dex)  2018 - good initial response  2. Transition to Rituxan, with excellent response       BRIEF CASE HISTORY:  Andre Alvarez is a very pleasant 54 y.o. female who is referred to us for thrombocytopenia. She recently had CBC via PCP which showed PLT count at 5. She has no other history of lab work. She notes she has petechia ecchymosis which started about a year ago and has been bruising easily over the last weeks. She was on steroids for shoulder dislocation a few moths ago and had GI distress. She reports the bruising started after that. She had hypothyroidism and takes Synthroid. She has not started new medication recently and does not take any blood thinners or pain medication. She has no history of stroke, heart attack, previous blood disorders, diabetes, or cancer. She does not drink alcohol or smoke cigarettes. Started on steroids 2018 - good initial response. She was found to be steroid dependent, plan for Rituxan. Excellent response. She had persistent bone pain and bone marrow biopsy was done and was negative. Her nephew was diagnosed with ITP 2019 at age 55.    she was later diagnosed with hashimoto thyroiditis. INTERIM HISTORY: The patient presents for follow up for ITP and to review lab work. She feels well with no new symptoms. She has US of thyroid and spleen via PCP, possible Hashimoto's disease, she is getting referral to endocrinology. She has family history of Hashimoto's. PAST MEDICAL HISTORY: has a past medical history of Acute ITP ([address] Atrial fibrillation ([address] Hyperthyroidism, and Wears dentures. PAST SURGICAL HISTORY: has a past surgical history that includes  section ().      CURRENT MEDICATIONS:  has a current medication list which includes the following prescription(s): lisinopril, omeprazole, levothyroxine, levothyroxine, vitamin d3, biotin, potassium, homeopathic products, and acetaminophen. ALLERGIES:  is allergic to bee venom and nuts [peanut-containing drug products]. FAMILY HISTORY: Mother and nephew: colon cancer; Aunt: bladder cancer; mother, aunt, 2 nieces with Hashimoto's; nephew ITP     SOCIAL HISTORY:  reports that she has never smoked. She has never used smokeless tobacco. She reports current alcohol use. She reports that she does not use drugs. REVIEW OF SYSTEMS:   General: No fever or night sweats. Weight is stable. ENT: No double or blurred vision, no tinnitus or hearing problem, no dysphagia or sore throat   Respiratory: No chest pain, no shortness of breath, no cough or hemoptysis. Cardiovascular: Denies chest pain, PND or orthopnea. No L E swelling or palpitations. Gastrointestinal:  No nausea or vomiting, diarrhea or constipation. +intermittent abdominal pain - resolved  Genitourinary: Denies dysuria, hematuria, frequency, urgency or incontinence. Neurological: Denies headaches, decreased LOC, no sensory or motor focal deficits. Musculoskeletal:  No arthralgia no back pain or joint swelling. Skin: There are no rashes or bleeding. Psychiatric:  No anxiety, no depression. Endocrine: No diabetes or thyroid disease. Hematologic: No bleeding, no adenopathy. PHYSICAL EXAM: Shows a well appearing 54y.o.-year-old female who is not in pain or distress. Vital Signs: Blood pressure (!) 167/93, pulse 59, temperature 98.1 °F (36.7 °C), temperature source Oral, resp. rate 16, weight 164 lb 6.4 oz (74.6 kg), not currently breastfeeding. HEENT: Normocephalic and atraumatic. Pupils are equal, round, reactive to light and accommodation. Extraocular muscles are intact. Neck: Showed no JVD, no carotid bruit . Lungs: Clear to auscultation bilaterally. Heart: Regular without any murmur.  Abdomen: Soft, mild tenderness LUQ. No hepatosplenomegaly. Extremities: No point tenderness in knee or hip. Lower extremities show no edema, clubbing, or cyanosis. Breasts: Examination not done today. Neuro exam: intact cranial nerves bilaterally no motor or sensory deficit, gait is normal. Lymphatic: no adenopathy appreciated in the supraclavicular, axillary, cervical or inguinal area Skin: petechia and bruising; bone marrow site is well healed    REVIEW OF LABORATORY DATA:   Lab Results   Component Value Date    WBC 5.2 11/17/2020    HGB 14.8 11/17/2020    HCT 43.3 11/17/2020    MCV 91.7 11/17/2020     11/17/2020           Chemistry        Component Value Date/Time     05/19/2020 1028    K 4.2 05/19/2020 1028     05/19/2020 1028    CO2 26 05/19/2020 1028    BUN 12 05/19/2020 1028    CREATININE 0.74 05/19/2020 1028        Component Value Date/Time    CALCIUM 9.5 05/19/2020 1028    ALKPHOS 83 05/19/2020 1028    AST 18 05/19/2020 1028    ALT 13 05/19/2020 1028    BILITOT 0.41 05/19/2020 1028          PATHOLOGY:     REVIEW OF RADIOLOGICAL RESULTS:     IMPRESSION:   1. ITP, steroid refractory  2. Started 4 day course of steroids - good initial response. 3. Steroid dependent, plan to transition to Rituxan - excellent response  4. Persistent bone pain, bone marrow was done, negative, ITP active but controlled  5. Hashimoto thyroiditis, on synthroid     PLAN:   1.

## 2020-11-17 NOTE — TELEPHONE ENCOUNTER
AVS from 11/17/20     rv in October/ 2021 with cbc, cmp     rv scheduled for 10/19/21 @ 10am pt will have labs drawn at that time    Pt was given AVS and appt schedule

## 2020-11-18 LAB
THYROGLOBULIN AB: 43.1 IU/ML (ref 0–40)
THYROID PEROXIDASE (TPO) AB: >1000 IU/ML (ref 0–35)

## 2020-11-20 LAB
T3 TOTAL: 90 NG/DL (ref 60–181)
T4 TOTAL: 8.3 UG/DL (ref 4.5–10.9)

## 2020-12-15 ENCOUNTER — OFFICE VISIT (OUTPATIENT)
Dept: FAMILY MEDICINE CLINIC | Age: 55
End: 2020-12-15
Payer: MEDICARE

## 2020-12-15 PROCEDURE — 3017F COLORECTAL CA SCREEN DOC REV: CPT | Performed by: NURSE PRACTITIONER

## 2020-12-15 PROCEDURE — G8419 CALC BMI OUT NRM PARAM NOF/U: HCPCS | Performed by: NURSE PRACTITIONER

## 2020-12-15 PROCEDURE — G8484 FLU IMMUNIZE NO ADMIN: HCPCS | Performed by: NURSE PRACTITIONER

## 2020-12-15 PROCEDURE — 1036F TOBACCO NON-USER: CPT | Performed by: NURSE PRACTITIONER

## 2020-12-15 PROCEDURE — 99214 OFFICE O/P EST MOD 30 MIN: CPT | Performed by: NURSE PRACTITIONER

## 2020-12-15 PROCEDURE — G8427 DOCREV CUR MEDS BY ELIG CLIN: HCPCS | Performed by: NURSE PRACTITIONER

## 2020-12-15 RX ORDER — HYDROCHLOROTHIAZIDE 12.5 MG/1
25 CAPSULE, GELATIN COATED ORAL EVERY MORNING
Qty: 42 CAPSULE | Refills: 0 | Status: SHIPPED
Start: 2020-12-15 | End: 2021-04-05 | Stop reason: DRUGHIGH

## 2020-12-15 RX ORDER — HYDROCHLOROTHIAZIDE 25 MG/1
25 TABLET ORAL DAILY
Qty: 90 TABLET | Refills: 0 | Status: SHIPPED | OUTPATIENT
Start: 2020-12-15 | End: 2021-04-03 | Stop reason: SDUPTHER

## 2020-12-15 ASSESSMENT — ENCOUNTER SYMPTOMS
RHINORRHEA: 0
ABDOMINAL PAIN: 0
CONSTIPATION: 0
DIARRHEA: 0
CHEST TIGHTNESS: 0
SHORTNESS OF BREATH: 0
WHEEZING: 0
SINUS PRESSURE: 0
COUGH: 0
TROUBLE SWALLOWING: 0
BLOOD IN STOOL: 0
BACK PAIN: 0
SORE THROAT: 0
NAUSEA: 0

## 2020-12-15 ASSESSMENT — VISUAL ACUITY: OU: 1

## 2020-12-15 NOTE — PROGRESS NOTES
[facility] [address]  [phone]    12/15/2020     Patient ID: Alida Dudley is a 54 y.o. female. CHIEF COMPLAINT:     Alida Dudley presents today for   Chief Complaint   Patient presents with    Medication Check     Lisinopril caused cough. Stopped taking and cough went away.  Shoulder Injury     Onset 2 days ago. Rt side, barely able to move. Icing. Moving boxes possibly. Pain is aching. Emy Champlain VISIT INFORMATION:      Have you changed or started any medications since your last visit including any over-the-counter medicines, vitamins, or herbal medicines? yes - Med list updated   Are you having any side effects from any of your medications? -  Lisinopril caused daily cough. Have you stopped taking any of your medications? Is so, why? -  yes - Med list updated    Have you seen any other physician or provider since your last visit? Yes - [place] Duty Dr. Alejandra Correa   Have you had any other diagnostic tests since your last visit? Yes - Records Obtained Blood work   Have you been seen in the emergency room and/or had an admission to a hospital since we last saw you? No    Have you activated your [employer] account? If not, what are your barriers?  Yes    Patient Care Team:  JAYLENE Munson CNP as PCP - General (Nurse Practitioner Family)  JAYLENE Munson CNP as PCP [facility]  Annie Cerrato MD as Consulting Physician (Endocrinology)  Samuel Dawn MD as Consulting Physician (Hematology and Oncology)    REVIEWED:     [x] Laboratory Results, Vital signs, Imaging, Active Problems, Immunizations, Current/Recently Discontinued Medications, Health Maintenance Activities Due, Referral Notes (if available) were reviewed per writer     [x] Reviewed Depression screening if taken or valid today or any other valid screening tool (others seen below) Interpretation of Total Score DepressionSeverity: 1-4 = Minimal depression, 5-9 = Mild depression, 10-14 = Moderate depression, 15-19 = Moderately severe depression, 20-27 =Severe depression    PHQ Scores 2020   PHQ2 Score 0 0   PHQ9 Score 0 0       Interpretation of Total Score DepressionSeverity: 1-4 = Minimal depression, 5-9 = Mild depression, 10-14 = Moderate depression, 15-19 = Moderately severe depression, 20-27 = Severe depression    Allergies   Allergen Reactions    Bee Venom Hives    Nuts [Peanut-Containing Drug Products] Hives     Current Outpatient Medications   Medication Sig Dispense Refill    Wheat Dextrin-Calcium (BENEFIBER PLUS CALCIUM PO) Take by mouth daily      hydroCHLOROthiazide (MICROZIDE) 12.5 MG capsule Take 2 capsules by mouth every morning Week 1 and 2 take 1 tab, then moving forward 2 tabs 42 capsule 0    hydroCHLOROthiazide (HYDRODIURIL) 25 MG tablet Take 1 tablet by mouth daily 90 tablet 0    diclofenac sodium (VOLTAREN) 1 % GEL Apply 2 g topically 4 times daily as needed for Pain 100 g 0    omeprazole (PRILOSEC) 20 MG delayed release capsule Take 1 capsule by mouth Daily 30 capsule 2    levothyroxine (SYNTHROID) 50 MCG tablet Take 1 tablet by mouth Daily 90 tablet 1    Cholecalciferol (VITAMIN D3) 2000 units CAPS Take 1 capsule by mouth daily      Biotin 5000 MCG CAPS Take 1 capsule by mouth      Potassium 99 MG TABS Take by mouth      Homeopathic Products (CLEARLIFE ALLERGY RELIEF NA) by Nasal route Indications: OTC allergy medication       No current facility-administered medications for this visit.       Past Medical History:   Diagnosis Date    Acute [address]P [address] [address] 2018    Atrial fibrillation (HCC)     Hyperthyroidism     Wears dentures       Past Surgical History:   Procedure Laterality Date     SECTION       Family History   Problem Relation Age of Onset    Hypertension Mother     Thyroid Disease Mother     Colon Cancer Mother 79    Diabetes Father     Heart Attack Father     Breast Cancer Sister 61    High Blood Pressure Brother     Heart Disease Brother     Cancer Brother         liver cancer     Other Brother         MVA    Arthritis Brother     Depression Brother     Alcohol Abuse Brother     Colon Cancer Other 44        nephew - her brother's son     Social History     Tobacco Use    Smoking status: Never Smoker    Smokeless tobacco: Never Used   Substance Use Topics    Alcohol use: Yes     Comment: rare       Health Maintenance   Topic Date Due    Cervical cancer screen  08/12/1986    Breast cancer screen  08/12/2005    Shingles Vaccine (1 of 2) 08/12/2015    Colon Cancer Screen FIT/FOBT  07/30/2019    Potassium monitoring  05/19/2021    Creatinine monitoring  05/19/2021    TSH testing  11/17/2021    DTaP/Tdap/Td vaccine (2 - Td) 10/31/2023    Lipid screen  02/28/2025    Flu vaccine  Completed    Hepatitis C screen  Completed    HIV screen  Completed    Hepatitis A vaccine  Aged Out    Hepatitis B vaccine  Aged Out    Hib vaccine  Aged Out    Meningococcal (ACWY) vaccine  Aged Out    Pneumococcal 0-64 years Vaccine  Aged Out       VISIT SUMMARY:      Patient is a very pleasant 63-year-old  female. She presents the office today to follow-up. Is also noted the patient's not been taking her blood pressure medication for approximately 3 weeks time. Patient was on lisinopril for approximately 2 years. She recently over the past few months started develop a very dry nagging cough. After review it was noted that this could be caused from her lisinopril. Patient does stop taking and has been taking for approximately 3 weeks time. Her cough ultimately has resolved. We will place her today on hydrochlorothiazide as her blood pressure is elevated. Patient denies any chest pain, shortness breath, headaches, dizziness or lightheaded nests with elevation in blood pressure.   Patient also brings my attention that she has limited range of motion to her right shoulder. She is unsure exactly what she did. It is not painful. However, she does have limited range of motion because it starts to become \"bothersome\". After assessing patient's right shoulder I do not feel imaging is necessary at this time. I feel as though it is just more of a tendinitis. Patient instructed to ice and try to limit any activity that can cause worsening of symptoms. If her symptoms do worsen or she develops pain, numbness or tingling to her hand, she is to notify me immediately. Patient still does not have an appointment scheduled with endocrinology. We will continue to monitor her thyroid levels and adjust her Synthroid as needed. Patient recently followed with hematology due to her thrombocytopenia. Her platelets are stable. She now only needs to follow with him on a yearly basis. HISTORY OF PRESENT ILLNESS:     Shoulder Injury   The right shoulder is affected. The incident occurred more than 1 week ago. There was no injury mechanism. The quality of the pain is described as aching. The pain does not radiate. The pain is mild. Pertinent negatives include no chest pain or numbness. The symptoms are aggravated by movement. Hypertension  This is a chronic problem. The current episode started more than 1 year ago. The problem has been gradually worsening (stopped ACE due to cough) since onset. Pertinent negatives include no anxiety, chest pain, headaches, malaise/fatigue, palpitations, peripheral edema or shortness of breath. Past treatments include ACE inhibitors. The current treatment provides moderate improvement. There are no compliance problems. REVIEW OF SYMPTOMS:      Review of Systems   Constitutional: Negative for activity change, appetite change, chills, fatigue, fever and malaise/fatigue. HENT: Negative for congestion, ear pain, postnasal drip, rhinorrhea, sinus pressure, sneezing, sore throat and trouble swallowing.     Respiratory: Negative for cough, chest tightness, shortness of breath and wheezing. Cardiovascular: Negative for chest pain, palpitations and leg swelling. Gastrointestinal: Negative for abdominal pain, blood in stool, constipation, diarrhea and nausea. Genitourinary: Negative for difficulty urinating, dysuria, frequency, hematuria and urgency. Musculoskeletal: Positive for arthralgias (right shoulder). Negative for back pain, gait problem, joint swelling and myalgias. Skin: Negative for rash and wound. Allergic/Immunologic: Negative for environmental allergies and food allergies. Neurological: Negative for dizziness, light-headedness, numbness and headaches. Psychiatric/Behavioral: Negative for agitation, decreased concentration, self-injury, sleep disturbance and suicidal ideas. The patient is not nervous/anxious. PHYSICAL EXAM:     BP (!) 154/98 (Site: Left Upper Arm, Position: Sitting, Cuff Size: Medium Adult)   Pulse 65   Temp 97.2 °F (36.2 °C) (Temporal)   Resp 18   Wt 165 lb 12.8 oz (75.2 kg)   LMP  (LMP Unknown)   SpO2 99%   BMI 26.76 kg/m²      Physical Exam  Vitals signs and nursing note reviewed. Constitutional:       General: She is not in acute distress. Appearance: Normal appearance. She is well-developed and well-groomed. She is not ill-appearing or toxic-appearing. HENT:      Head: Normocephalic. Right Ear: Tympanic membrane, ear canal and external ear normal. No middle ear effusion. There is no impacted cerumen. Tympanic membrane is not erythematous, retracted or bulging. Left Ear: Tympanic membrane, ear canal and external ear normal.  No middle ear effusion. There is no impacted cerumen. Tympanic membrane is not erythematous, retracted or bulging. Nose: Nose normal. No mucosal edema, congestion or rhinorrhea. Right Sinus: No maxillary sinus tenderness or frontal sinus tenderness. Left Sinus: No maxillary sinus tenderness or frontal sinus tenderness.       Mouth/Throat: Lips: Pink. Mouth: Mucous membranes are moist.      Pharynx: Oropharynx is clear. No oropharyngeal exudate, posterior oropharyngeal erythema or uvula swelling. Eyes:      General: Lids are normal. Vision grossly intact. No allergic shiner. Conjunctiva/sclera: Conjunctivae normal.   Neck:      Musculoskeletal: Normal range of motion. No pain with movement or torticollis. Cardiovascular:      Rate and Rhythm: Normal rate and regular rhythm. No extrasystoles are present. Pulses: Normal pulses. Dorsalis pedis pulses are 2+ on the right side and 2+ on the left side. Heart sounds: Normal heart sounds, S1 normal and S2 normal. No murmur. Pulmonary:      Effort: Pulmonary effort is normal. No accessory muscle usage, prolonged expiration or respiratory distress. Breath sounds: Normal breath sounds and air entry. Abdominal:      General: There is no distension. Palpations: Abdomen is soft. Tenderness: There is no abdominal tenderness. Musculoskeletal:      Right shoulder: She exhibits decreased range of motion, tenderness and pain. She exhibits no swelling, no effusion, no crepitus, no spasm, normal pulse and normal strength. Right lower leg: No edema. Left lower leg: No edema. Lymphadenopathy:      Cervical: No cervical adenopathy. Skin:     General: Skin is warm and dry. Coloration: Skin is not ashen, cyanotic, jaundiced or pale. Neurological:      General: No focal deficit present. Mental Status: She is alert and oriented to person, place, and time. Motor: Motor function is intact. Gait: Gait is intact. Psychiatric:         Attention and Perception: Attention and perception normal.         Mood and Affect: Mood and affect normal.         Speech: Speech normal.         Behavior: Behavior normal. Behavior is cooperative. Thought Content:  Thought content normal.         Cognition and Memory: Cognition and memory normal. Judgment: Judgment normal.          ASSESSMENT / PLAN     1. Essential hypertension  New medicaiton   - hydroCHLOROthiazide (MICROZIDE) 12.5 MG capsule; Take 2 capsules by mouth every morning Week 1 and 2 take 1 tab, then moving forward 2 tabs  Dispense: 42 capsule; Refill: 0  - hydroCHLOROthiazide (HYDRODIURIL) 25 MG tablet; Take 1 tablet by mouth daily  Dispense: 90 tablet; Refill: 0    2. Hypothyroidism due to Hashimoto's thyroiditis  Stable, medicated, monitered   - Brittny Weaver MD, Endocrinology, [place]  - TSH With Reflex Ft4; Future    3. Injury of right shoulder, initial encounter  New medicaiton   - diclofenac sodium (VOLTAREN) 1 % GEL; Apply 2 g topically 4 times daily as needed for Pain  Dispense: 100 g; Refill: 0    4. History of ITP  - CBC; Future      Return in about 6 months (around 6/15/2021) for Follow up as needed, Call if systems do not improve or get worse.     Orders Placed This Encounter   Procedures    TSH With Reflex Ft4     Standing Status:   Future     Standing Expiration Date:   6/15/2021    CBC     Standing Status:   Future     Standing Expiration Date:   6/16/2021   Madie Cavazos MD, Endocrinology, [place]     Referral Priority:   Routine     Referral Type:   Eval and Treat     Referral Reason:   Specialty Services Required     Referred to Provider:   Sunil Fernández MD     Requested Specialty:   Endocrinology     Number of Visits Requested:   1       Orders Placed This Encounter   Medications    hydroCHLOROthiazide (MICROZIDE) 12.5 MG capsule     Sig: Take 2 capsules by mouth every morning Week 1 and 2 take 1 tab, then moving forward 2 tabs     Dispense:  42 capsule     Refill:  0    hydroCHLOROthiazide (HYDRODIURIL) 25 MG tablet     Sig: Take 1 tablet by mouth daily     Dispense:  90 tablet     Refill:  0    diclofenac sodium (VOLTAREN) 1 % GEL     Sig: Apply 2 g topically 4 times daily as needed for Pain     Dispense:  100 g     Refill:  0       COMMUNICATION:     We will continue to monitor thyroid levels until patient is able to get in with endocrinology  Okay to not continue with lisinopril due to side effect of cough  We will start hydrochlorothiazide  Week 1 I want her to take 12.5 mg then she can increase it to 25 mg  All patient's questions were answered  Patient voiced understanding plan of care  Instructed to continue all medications as prescribed  Discussed health maintenance and education given    QUALITY MEASURES:     BMI Readings from Last 1 Encounters:   12/15/20 26.76 kg/m²        No results found for: LABA1C    BP Readings from Last 3 Encounters:   12/15/20 (!) 154/98   11/17/20 (!) 167/93   05/19/20 (!) 167/83        The 10-year ASCVD risk score (Gloria Flores, et al., 2013) is: 3.4%    Values used to calculate the score:      Age: 54 years      Sex: Female      Is Non- : No      Diabetic: No      Tobacco smoker: No      Systolic Blood Pressure: 344 mmHg      Is BP treated: Yes      HDL Cholesterol: 62 mg/dL      Total Cholesterol: 199 mg/dL     This note is created with the assistance of a speech-recognition program. While intending to generate a document that actually reflects the content of the visit, no guarantees can be provided that every mistake has been identified and corrected by editing.     Electronically signed by EDEL Nunn on 12/20/2020 at 5:09 PM

## 2020-12-15 NOTE — PATIENT INSTRUCTIONS
Patient Education        Rotator Cuff: Exercises  Introduction  Here are some examples of exercises for you to try. The exercises may be suggested for a condition or for rehabilitation. Start each exercise slowly. Ease off the exercises if you start to have pain. You will be told when to start these exercises and which ones will work best for you. How to do the exercises  Pendulum swing   If you have pain in your back, do not do this exercise. 1. Hold on to a table or the back of a chair with your good arm. Then bend forward a little and let your sore arm hang straight down. This exercise does not use the arm muscles. Rather, use your legs and your hips to create movement that makes your arm swing freely. 2. Use the movement from your hips and legs to guide the slightly swinging arm back and forth like a pendulum (or elephant trunk). Then guide it in circles that start small (about the size of a dinner plate). Make the circles a bit larger each day, as your pain allows. 3. Do this exercise for 5 minutes, 5 to 7 times each day. 4. As you have less pain, try bending over a little farther to do this exercise. This will increase the amount of movement at your shoulder. Posterior stretching exercise   1. Hold the elbow of your injured arm with your other hand. 2. Use your hand to pull your injured arm gently up and across your body. You will feel a gentle stretch across the back of your injured shoulder. 3. Hold for at least 15 to 30 seconds. Then slowly lower your arm. 4. Repeat 2 to 4 times. Up-the-back stretch   Your doctor or physical therapist may want you to wait to do this stretch until you have regained most of your range of motion and strength. You can do this stretch in different ways. Hold any of these stretches for at least 15 to 30 seconds. Repeat them 2 to 4 times. 1. Light stretch: Put your hand in your back pocket. Let it rest there to stretch your shoulder. 2. Moderate stretch:  With seconds. 4. Repeat 2 to 4 times. Wall climbing (to the side)   Avoid any movement that is straight to your side, and be careful not to arch your back. Your arm should stay about 30 degrees to the front of your side. 1. Stand with your side to a wall so that your fingers can just touch it at an angle about 30 degrees toward the front of your body. 2. Walk the fingers of your injured arm up the wall as high as pain permits. Try not to shrug your shoulder up toward your ear as you move your arm up. 3. Hold that position for a count of at least 15 to 20.  4. Walk your fingers back down to the starting position. 5. Repeat at least 2 to 4 times. Try to reach higher each time. Wall climbing (to the front)   During this stretching exercise, be careful not to arch your back. 1. Face a wall, and stand so your fingers can just touch it. 2. Keeping your shoulder down, walk the fingers of your injured arm up the wall as high as pain permits. (Don't shrug your shoulder up toward your ear.)  3. Hold your arm in that position for at least 15 to 30 seconds. 4. Slowly walk your fingers back down to where you started. 5. Repeat at least 2 to 4 times. Try to reach higher each time. Shoulder blade squeeze   1. Stand with your arms at your sides, and squeeze your shoulder blades together. Do not raise your shoulders up as you squeeze. 2. Hold 6 seconds. 3. Repeat 8 to 12 times. Scapular exercise: Arm reach   1. Lie flat on your back. This exercise is a very slight motion that starts with your arms raised (elbows straight, arms straight). 2. From this position, reach higher toward the marily or ceiling. Keep your elbows straight. All motion should be from your shoulder blade only. 3. Relax your arms back to where you started. 4. Repeat 8 to 12 times. Arm raise to the side   During this strengthening exercise, your arm should stay about 30 degrees to the front of your side.   1. Slowly raise your injured arm to the Squeeze a rolled towel between your elbow and your body for comfort. This will help keep your arm at your side. 3. Hold one end of the elastic band with the hand of the painful arm. 4. Start with your forearm across your belly. Slowly rotate the forearm out away from your body. Keep your elbow and upper arm tucked against the towel roll or the side of your body until you begin to feel tightness in your shoulder. Slowly move your arm back to where you started. 5. Repeat 8 to 12 times. Follow-up care is a key part of your treatment and safety. Be sure to make and go to all appointments, and call your doctor if you are having problems. It's also a good idea to know your test results and keep a list of the medicines you take. Where can you learn more? Go to https://Coubtaqueriaeb.Tutor Trove. org and sign in to your tracx account. Enter Delphine Meetsri in the [facility] box to learn more about \"Rotator Cuff: Exercises. \"     If you do not have an account, please click on the \"Sign Up Now\" link. Current as of: March 2, 2020               Content Version: 12.6  © 0381-6397 [employer]. Care instructions adapted under license by [facility] ([facility]). If you have questions about a medical condition or this instruction, always ask your healthcare professional. [address] any warranty or liability for your use of this information.

## 2020-12-16 VITALS
RESPIRATION RATE: 18 BRPM | WEIGHT: 165.8 LBS | TEMPERATURE: 97.2 F | OXYGEN SATURATION: 99 % | DIASTOLIC BLOOD PRESSURE: 98 MMHG | BODY MASS INDEX: 26.76 KG/M2 | SYSTOLIC BLOOD PRESSURE: 154 MMHG | HEART RATE: 65 BPM

## 2020-12-21 RX ORDER — OMEPRAZOLE 20 MG/1
CAPSULE, DELAYED RELEASE ORAL
Qty: 30 CAPSULE | Refills: 1 | Status: SHIPPED | OUTPATIENT
Start: 2020-12-21 | End: 2021-01-30 | Stop reason: SDUPTHER

## 2021-01-12 DIAGNOSIS — S49.91XA INJURY OF RIGHT SHOULDER, INITIAL ENCOUNTER: ICD-10-CM

## 2021-01-13 NOTE — TELEPHONE ENCOUNTER
LOV 12/15/20  RTO 6 mo  LRF 12/15/20    Health Maintenance   Topic Date Due    Cervical cancer screen  08/12/1986    Breast cancer screen  08/12/2005    Shingles Vaccine (1 of 2) 08/12/2015    Colon Cancer Screen FIT/FOBT  07/30/2019    Potassium monitoring  05/19/2021    Creatinine monitoring  05/19/2021    TSH testing  11/17/2021    DTaP/Tdap/Td vaccine (2 - Td) 10/31/2023    Lipid screen  02/28/2025    Flu vaccine  Completed    Hepatitis C screen  Completed    HIV screen  Completed    Hepatitis A vaccine  Aged Out    Hepatitis B vaccine  Aged Out    Hib vaccine  Aged Out    Meningococcal (ACWY) vaccine  Aged Out    Pneumococcal 0-64 years Vaccine  Aged Out             (applicable per patient's age: Cancer Screenings, Depression Screening, Fall Risk Screening, Immunizations)    LDL Cholesterol (mg/dL)   Date Value   02/29/2020 120     AST (U/L)   Date Value   05/19/2020 18     ALT (U/L)   Date Value   05/19/2020 13     BUN (mg/dL)   Date Value   05/19/2020 12      (goal A1C is < 7)   (goal LDL is <100) need 30-50% reduction from baseline     BP Readings from Last 3 Encounters:   12/15/20 (!) 154/98   11/17/20 (!) 167/93   05/19/20 (!) 167/83    (goal /80)      All Future Testing planned in [facility]:  Lab Frequency Next Occurrence   HENRIQUE DIGITAL SCREEN W CAD BILATERAL Once 03/29/2020   POCT Fecal Immunochemical Test (FIT) Once 03/20/2020   TSH With Reflex Ft4 Once 06/15/2021   CBC Once 06/16/2021       Next Visit Date:  Future Appointments   Date Time Provider Leon Light   10/19/2021 10:00 AM Julius Uribe MD [address] [place] [address]            Patient Active Problem List:     Chronic non-seasonal allergic rhinitis     Wears dentures     Family history of breast cancer in sister     Abnormal bruising     Acute ITP (HCC)     Thyroid enlargement     History of ITP     Mixed hyperlipidemia     Essential hypertension     Personal history of diseases of the blood and blood-forming organs and certain disorders involving the immune mechanism     Hypothyroidism due to Hashimoto's thyroiditis     Fatigue

## 2021-01-30 DIAGNOSIS — Z86.2 HISTORY OF ITP: Primary | ICD-10-CM

## 2021-02-02 RX ORDER — OMEPRAZOLE 20 MG/1
20 CAPSULE, DELAYED RELEASE ORAL DAILY
Qty: 30 CAPSULE | Refills: 3 | Status: SHIPPED | OUTPATIENT
Start: 2021-02-02 | End: 2021-06-12 | Stop reason: SDUPTHER

## 2021-02-19 DIAGNOSIS — E04.9 THYROID ENLARGEMENT: ICD-10-CM

## 2021-02-19 DIAGNOSIS — Z86.39 HISTORY OF HYPERTHYROIDISM: ICD-10-CM

## 2021-02-19 RX ORDER — LEVOTHYROXINE SODIUM 0.05 MG/1
50 TABLET ORAL DAILY
Qty: 90 TABLET | Refills: 1 | Status: SHIPPED
Start: 2021-02-19 | End: 2021-04-11 | Stop reason: DRUGHIGH

## 2021-02-19 NOTE — TELEPHONE ENCOUNTER
LOV 12/15/20  RTO 6 months  LRF 7/24/20    Health Maintenance   Topic Date Due    Cervical cancer screen  08/12/1986    Breast cancer screen  08/12/2005    Shingles Vaccine (1 of 2) 08/12/2015    Colon Cancer Screen FIT/FOBT  07/30/2019    Potassium monitoring  05/19/2021    Creatinine monitoring  05/19/2021    TSH testing  11/17/2021    DTaP/Tdap/Td vaccine (2 - Td) 10/31/2023    Lipid screen  02/28/2025    Flu vaccine  Completed    Hepatitis C screen  Completed    HIV screen  Completed    Hepatitis A vaccine  Aged Out    Hepatitis B vaccine  Aged Out    Hib vaccine  Aged Out    Meningococcal (ACWY) vaccine  Aged Out    Pneumococcal 0-64 years Vaccine  Aged Out             (applicable per patient's age: Cancer Screenings, Depression Screening, Fall Risk Screening, Immunizations)    LDL Cholesterol (mg/dL)   Date Value   02/29/2020 120     AST (U/L)   Date Value   05/19/2020 18     ALT (U/L)   Date Value   05/19/2020 13     BUN (mg/dL)   Date Value   05/19/2020 12      (goal A1C is < 7)   (goal LDL is <100) need 30-50% reduction from baseline     BP Readings from Last 3 Encounters:   12/15/20 (!) 154/98   11/17/20 (!) 167/93   05/19/20 (!) 167/83    (goal /80)      All Future Testing planned in [facility]:  Lab Frequency Next Occurrence   HENRIQUE DIGITAL SCREEN W CAD BILATERAL Once 03/29/2020   POCT Fecal Immunochemical Test (FIT) Once 03/20/2020   TSH With Reflex Ft4 Once 06/15/2021   CBC Once 06/16/2021       Next Visit Date:  Future Appointments   Date Time Provider Leon Light   10/19/2021 10:00 AM Jasmin Menchaca MD [address] [place] [address]            Patient Active Problem List:     Chronic non-seasonal allergic rhinitis     Wears dentures     Family history of breast cancer in sister     Abnormal bruising     Acute ITP (HCC)     Thyroid enlargement     History of ITP     Mixed hyperlipidemia     Essential hypertension     Personal history of diseases of the blood and blood-forming organs and certain disorders involving the immune mechanism     Hypothyroidism due to Hashimoto's thyroiditis     Fatigue

## 2021-04-03 DIAGNOSIS — I10 ESSENTIAL HYPERTENSION: ICD-10-CM

## 2021-04-05 RX ORDER — HYDROCHLOROTHIAZIDE 25 MG/1
25 TABLET ORAL DAILY
Qty: 90 TABLET | Refills: 1 | Status: SHIPPED | OUTPATIENT
Start: 2021-04-05 | End: 2021-10-02 | Stop reason: SDUPTHER

## 2021-04-10 ENCOUNTER — HOSPITAL ENCOUNTER (OUTPATIENT)
Age: 56
Discharge: HOME | End: 2021-04-10
Payer: MEDICARE

## 2021-04-10 DIAGNOSIS — Z86.2 HISTORY OF ITP: ICD-10-CM

## 2021-04-10 DIAGNOSIS — E03.8 HYPOTHYROIDISM DUE TO HASHIMOTO'S THYROIDITIS: ICD-10-CM

## 2021-04-10 DIAGNOSIS — E06.3 HYPOTHYROIDISM DUE TO HASHIMOTO'S THYROIDITIS: ICD-10-CM

## 2021-04-10 LAB
HCT VFR BLD CALC: 43 % (ref 36.3–47.1)
HEMOGLOBIN: 14.4 G/DL (ref 11.9–15.1)
MCH RBC QN AUTO: 30.9 PG (ref 25.2–33.5)
MCHC RBC AUTO-ENTMCNC: 33.5 G/DL (ref 28.4–34.8)
MCV RBC AUTO: 92.3 FL (ref 82.6–102.9)
NRBC AUTOMATED: 0 PER 100 WBC
PDW BLD-RTO: 13.5 % (ref 11.8–14.4)
PLATELET # BLD: 210 K/UL (ref 138–453)
PMV BLD AUTO: 9.4 FL (ref 8.1–13.5)
RBC # BLD: 4.66 M/UL (ref 3.95–5.11)
THYROXINE, FREE: 1.39 NG/DL (ref 0.93–1.7)
TSH SERPL DL<=0.05 MIU/L-ACNC: 13.22 MIU/L (ref 0.3–5)
WBC # BLD: 5.7 K/UL (ref 3.5–11.3)

## 2021-04-10 PROCEDURE — 36415 COLL VENOUS BLD VENIPUNCTURE: CPT

## 2021-04-10 PROCEDURE — 84439 ASSAY OF FREE THYROXINE: CPT

## 2021-04-10 PROCEDURE — 84443 ASSAY THYROID STIM HORMONE: CPT

## 2021-04-10 PROCEDURE — 85027 COMPLETE CBC AUTOMATED: CPT

## 2021-04-11 DIAGNOSIS — E03.8 HYPOTHYROIDISM DUE TO HASHIMOTO'S THYROIDITIS: Primary | ICD-10-CM

## 2021-04-11 DIAGNOSIS — E06.3 HYPOTHYROIDISM DUE TO HASHIMOTO'S THYROIDITIS: Primary | ICD-10-CM

## 2021-04-11 RX ORDER — LEVOTHYROXINE SODIUM 0.07 MG/1
75 TABLET ORAL DAILY
Qty: 90 TABLET | Refills: 0 | Status: SHIPPED | OUTPATIENT
Start: 2021-04-11 | End: 2021-07-09 | Stop reason: SDUPTHER

## 2021-04-15 ENCOUNTER — PATIENT MESSAGE (OUTPATIENT)
Dept: FAMILY MEDICINE CLINIC | Age: 56
End: 2021-04-15

## 2021-04-15 NOTE — TELEPHONE ENCOUNTER
From: Gregg Mills  To: JAYLENE Estes - RORY  Sent: 4/15/2021 5:11 PM EDT  Subject: Prescription Question    Hi Sue,   I've been taking what you have prescribed. I picked up the new script for a higher dose 75 mcg levothyroxine. Should I start right away on it? And for my labs in 2 months will it all be in the system or should I stop in for paper work? Hope you are well.    Charles [place]

## 2021-05-04 ENCOUNTER — NURSE TRIAGE (OUTPATIENT)
Dept: OTHER | Facility: CLINIC | Age: 56
End: 2021-05-04

## 2021-05-06 NOTE — TELEPHONE ENCOUNTER
Called pt and offered walkin, but she states that she will come to her appt on Tuesday
Patient scheduled with Max Tuesday 1130. I have a provider fill at 130 that day if she would like to come in a few hours later.
patient was connected to triage in response to information provided to the ECC. Please do not respond through this encounter as the response is not directed to a shared pool.

## 2021-05-11 ENCOUNTER — OFFICE VISIT (OUTPATIENT)
Dept: FAMILY MEDICINE CLINIC | Age: 56
End: 2021-05-11
Payer: MEDICARE

## 2021-05-11 VITALS
RESPIRATION RATE: 14 BRPM | HEIGHT: 66 IN | BODY MASS INDEX: 27.13 KG/M2 | HEART RATE: 68 BPM | OXYGEN SATURATION: 99 % | SYSTOLIC BLOOD PRESSURE: 116 MMHG | TEMPERATURE: 97.4 F | DIASTOLIC BLOOD PRESSURE: 64 MMHG | WEIGHT: 168.8 LBS

## 2021-05-11 DIAGNOSIS — Z12.31 BREAST CANCER SCREENING BY MAMMOGRAM: Primary | ICD-10-CM

## 2021-05-11 DIAGNOSIS — E03.9 ACQUIRED HYPOTHYROIDISM: ICD-10-CM

## 2021-05-11 DIAGNOSIS — Z12.11 COLON CANCER SCREENING: ICD-10-CM

## 2021-05-11 DIAGNOSIS — M79.672 LEFT FOOT PAIN: ICD-10-CM

## 2021-05-11 DIAGNOSIS — Z00.00 ROUTINE GENERAL MEDICAL EXAMINATION AT A HEALTH CARE FACILITY: ICD-10-CM

## 2021-05-11 PROCEDURE — G8427 DOCREV CUR MEDS BY ELIG CLIN: HCPCS | Performed by: STUDENT IN AN ORGANIZED HEALTH CARE EDUCATION/TRAINING PROGRAM

## 2021-05-11 PROCEDURE — 1036F TOBACCO NON-USER: CPT | Performed by: STUDENT IN AN ORGANIZED HEALTH CARE EDUCATION/TRAINING PROGRAM

## 2021-05-11 PROCEDURE — 3017F COLORECTAL CA SCREEN DOC REV: CPT | Performed by: STUDENT IN AN ORGANIZED HEALTH CARE EDUCATION/TRAINING PROGRAM

## 2021-05-11 PROCEDURE — 99213 OFFICE O/P EST LOW 20 MIN: CPT | Performed by: STUDENT IN AN ORGANIZED HEALTH CARE EDUCATION/TRAINING PROGRAM

## 2021-05-11 PROCEDURE — G8419 CALC BMI OUT NRM PARAM NOF/U: HCPCS | Performed by: STUDENT IN AN ORGANIZED HEALTH CARE EDUCATION/TRAINING PROGRAM

## 2021-05-11 ASSESSMENT — PATIENT HEALTH QUESTIONNAIRE - PHQ9
SUM OF ALL RESPONSES TO PHQ9 QUESTIONS 1 & 2: 0
SUM OF ALL RESPONSES TO PHQ QUESTIONS 1-9: 0
1. LITTLE INTEREST OR PLEASURE IN DOING THINGS: 0

## 2021-05-11 ASSESSMENT — ENCOUNTER SYMPTOMS
BACK PAIN: 0
CHEST TIGHTNESS: 0
SORE THROAT: 0
COUGH: 0
WHEEZING: 0
DIARRHEA: 0
ABDOMINAL DISTENTION: 0
SHORTNESS OF BREATH: 0
ABDOMINAL PAIN: 0
CONSTIPATION: 0

## 2021-05-11 NOTE — PATIENT INSTRUCTIONS
Patient Education        Plantar Fasciitis: Exercises  Introduction  Here are some examples of exercises for you to try. The exercises may be suggested for a condition or for rehabilitation. Start each exercise slowly. Ease off the exercises if you start to have pain. You will be told when to start these exercises and which ones will work best for you. How to do the exercises  Towel stretch   1. Sit with your legs extended and knees straight. 2. Place a towel around your foot just under the toes. 3. Hold each end of the towel in each hand, with your hands above your knees. 4. Pull back with the towel so that your foot stretches toward you. 5. Hold the position for at least 15 to 30 seconds. 6. Repeat 2 to 4 times a session, up to 5 sessions a day. Calf stretch   This exercise stretches the muscles at the back of the lower leg (the calf) and the Achilles tendon. Do this exercise 3 or 4 times a day, 5 days a week. 1. Stand facing a wall with your hands on the wall at about eye level. Put the leg you want to stretch about a step behind your other leg. 2. Keeping your back heel on the floor, bend your front knee until you feel a stretch in the back leg. 3. Hold the stretch for 15 to 30 seconds. Repeat 2 to 4 times. Plantar fascia and calf stretch   Stretching the plantar fascia and calf muscles can increase flexibility and decrease heel pain. You can do this exercise several times each day and before and after activity. 1. Stand on a step as shown above. Be sure to hold on to the banister. 2. Slowly let your heels down over the edge of the step as you relax your calf muscles. You should feel a gentle stretch across the bottom of your foot and up the back of your leg to your knee. 3. Hold the stretch about 15 to 30 seconds, and then tighten your calf muscle a little to bring your heel back up to the level of the step. Repeat 2 to 4 times.     Towel curls   Make this exercise more challenging by placing a weighted object, such as a soup can, on the other end of the towel. 1. While sitting, place your foot on a towel on the floor and scrunch the towel toward you with your toes. 2. Then, also using your toes, push the towel away from you. [place] pickups   1. Put marbles on the floor next to a cup.  2. Using your toes, try to lift the marbles up from the floor and put them in the cup. Follow-up care is a key part of your treatment and safety. Be sure to make and go to all appointments, and call your doctor if you are having problems. It's also a good idea to know your test results and keep a list of the medicines you take. Where can you learn more? Go to https://iAdvize.Wishpot. org and sign in to your No Paper Just Vapor account. Enter P859 in the [email or web address] box to learn more about \"Plantar Fasciitis: Exercises. \"     If you do not have an account, please click on the \"Sign Up Now\" link. Current as of: November 16, 2020               Content Version: 12.8  © 2006-2021 [employer]. Care instructions adapted under license by [facility] ([facility]). If you have questions about a medical condition or this instruction, always ask your healthcare professional. [address] any warranty or liability for your use of this information. Patient Education        Plantar Fasciitis: Care Instructions  Overview     Plantar fasciitis is pain and inflammation of the plantar fascia, the tissue at the bottom of your foot that connects the heel bone to the toes. The plantar fascia also supports the arch. If you strain the plantar fascia, it can develop small tears and cause heel pain when you stand or walk. Plantar fasciitis can be caused by running or other sports. It also may occur in people who are overweight or who have high arches or flat feet. You may get plantar fasciitis if you walk or stand for long periods, or have a tight Achilles tendon or calf muscles.   You disclaims any warranty or liability for your use of this information. Patient Education        Plantar Fasciitis: Exercises  Introduction  Here are some examples of exercises for you to try. The exercises may be suggested for a condition or for rehabilitation. Start each exercise slowly. Ease off the exercises if you start to have pain. You will be told when to start these exercises and which ones will work best for you. How to do the exercises  Towel stretch   7. Sit with your legs extended and knees straight. 8. Place a towel around your foot just under the toes. 9. Hold each end of the towel in each hand, with your hands above your knees. 10. Pull back with the towel so that your foot stretches toward you. 11. Hold the position for at least 15 to 30 seconds. 12. Repeat 2 to 4 times a session, up to 5 sessions a day. Calf stretch   This exercise stretches the muscles at the back of the lower leg (the calf) and the Achilles tendon. Do this exercise 3 or 4 times a day, 5 days a week. 4. Stand facing a wall with your hands on the wall at about eye level. Put the leg you want to stretch about a step behind your other leg. 5. Keeping your back heel on the floor, bend your front knee until you feel a stretch in the back leg. 6. Hold the stretch for 15 to 30 seconds. Repeat 2 to 4 times. Plantar fascia and calf stretch   Stretching the plantar fascia and calf muscles can increase flexibility and decrease heel pain. You can do this exercise several times each day and before and after activity. 4. Stand on a step as shown above. Be sure to hold on to the banister. 5. Slowly let your heels down over the edge of the step as you relax your calf muscles. You should feel a gentle stretch across the bottom of your foot and up the back of your leg to your knee. 6. Hold the stretch about 15 to 30 seconds, and then tighten your calf muscle a little to bring your heel back up to the level of the step.

## 2021-05-11 NOTE — PROGRESS NOTES
Neurological: Negative for dizziness, weakness and light-headedness. Psychiatric/Behavioral: Negative for agitation, decreased concentration and sleep disturbance. Physical Exam  Vitals signs and nursing note reviewed. Constitutional:       Appearance: Normal appearance. HENT:      Head: Normocephalic and atraumatic. Eyes:      Extraocular Movements: Extraocular movements intact. Neck:      Musculoskeletal: Normal range of motion and neck supple. Cardiovascular:      Rate and Rhythm: Normal rate and regular rhythm. Pulses: Normal pulses. Heart sounds: Normal heart sounds. Pulmonary:      Effort: Pulmonary effort is normal.      Breath sounds: Normal breath sounds. Abdominal:      General: Abdomen is flat. Palpations: Abdomen is soft. Musculoskeletal: Normal range of motion. Skin:     General: Skin is warm. Neurological:      General: No focal deficit present. Mental Status: She is alert and oriented to person, place, and time. An electronic signature was used to authenticate this note.     --Israel Bal MD

## 2021-06-12 DIAGNOSIS — Z86.2 HISTORY OF ITP: ICD-10-CM

## 2021-06-14 RX ORDER — OMEPRAZOLE 20 MG/1
20 CAPSULE, DELAYED RELEASE ORAL DAILY
Qty: 30 CAPSULE | Refills: 3 | Status: SHIPPED | OUTPATIENT
Start: 2021-06-14 | End: 2021-10-02 | Stop reason: SDUPTHER

## 2021-07-09 DIAGNOSIS — E06.3 HYPOTHYROIDISM DUE TO HASHIMOTO'S THYROIDITIS: ICD-10-CM

## 2021-07-09 DIAGNOSIS — E03.8 HYPOTHYROIDISM DUE TO HASHIMOTO'S THYROIDITIS: ICD-10-CM

## 2021-07-09 NOTE — TELEPHONE ENCOUNTER
LOV 5/11/21  RTO Not listed  LRF 4/11/21    Health Maintenance   Topic Date Due    Breast cancer screen  Never done    Colon Cancer Screen FIT/FOBT  07/30/2019    Potassium monitoring  05/19/2021    Creatinine monitoring  05/19/2021    COVID-19 Vaccine (1) 09/30/2021 (Originally 8/12/1977)    Cervical cancer screen  05/11/2022 (Originally 8/12/1986)    Shingles Vaccine (1 of 2) 05/11/2022 (Originally 8/12/2015)    Flu vaccine (1) 09/01/2021    TSH testing  04/10/2022    DTaP/Tdap/Td vaccine (2 - Td or Tdap) 10/31/2023    Lipid screen  02/28/2025    Hepatitis C screen  Completed    HIV screen  Completed    Hepatitis A vaccine  Aged Out    Hepatitis B vaccine  Aged Out    Hib vaccine  Aged Out    Meningococcal (ACWY) vaccine  Aged Out    Pneumococcal 0-64 years Vaccine  Aged Out             (applicable per patient's age: Cancer Screenings, Depression Screening, Fall Risk Screening, Immunizations)    LDL Cholesterol (mg/dL)   Date Value   02/29/2020 120     AST (U/L)   Date Value   05/19/2020 18     ALT (U/L)   Date Value   05/19/2020 13     BUN (mg/dL)   Date Value   05/19/2020 12      (goal A1C is < 7)   (goal LDL is <100) need 30-50% reduction from baseline     BP Readings from Last 3 Encounters:   05/11/21 116/64   12/15/20 (!) 154/98   11/17/20 (!) 167/93    (goal /80)      All Future Testing planned in [facility]:  Lab Frequency Next Occurrence   TSH Once 07/11/2021   T4, Free Once 07/11/2021   Thyroid Antibodies Once 07/11/2021   T3, Free Once 07/11/2021   Comprehensive Metabolic Panel, Fasting Once 05/11/2022   HENRIQUE DIGITAL SCREEN W OR WO CAD BILATERAL Once 07/11/2022       Next Visit Date:  Future Appointments   Date Time Provider Leon Light   10/19/2021 10:00 AM Sammie Anthony MD [address] [place] [address]            Patient Active Problem List:     Chronic non-seasonal allergic rhinitis     Wears dentures     Family history of breast cancer in sister     Abnormal bruising Acute ITP (HCC)     Thyroid enlargement     History of ITP     Mixed hyperlipidemia     Essential hypertension     Personal history of diseases of the blood and blood-forming organs and certain disorders involving the immune mechanism     Hypothyroidism due to Hashimoto's thyroiditis     Fatigue

## 2021-07-12 RX ORDER — LEVOTHYROXINE SODIUM 0.07 MG/1
75 TABLET ORAL DAILY
Qty: 90 TABLET | Refills: 1 | Status: SHIPPED | OUTPATIENT
Start: 2021-07-12 | End: 2022-01-05

## 2021-07-28 DIAGNOSIS — D69.3 ACUTE ITP (HCC): Primary | ICD-10-CM

## 2021-07-29 ENCOUNTER — HOSPITAL ENCOUNTER (OUTPATIENT)
Age: 56
Discharge: HOME | End: 2021-07-29
Payer: MEDICARE

## 2021-07-29 DIAGNOSIS — E06.3 HYPOTHYROIDISM DUE TO HASHIMOTO'S THYROIDITIS: ICD-10-CM

## 2021-07-29 DIAGNOSIS — D69.3 ACUTE ITP (HCC): ICD-10-CM

## 2021-07-29 DIAGNOSIS — E03.8 HYPOTHYROIDISM DUE TO HASHIMOTO'S THYROIDITIS: ICD-10-CM

## 2021-07-29 LAB
ABSOLUTE EOS #: 0.3 K/UL (ref 0–0.4)
ABSOLUTE IMMATURE GRANULOCYTE: ABNORMAL K/UL (ref 0–0.3)
ABSOLUTE LYMPH #: 1.5 K/UL (ref 1–4.8)
ABSOLUTE MONO #: 0.4 K/UL (ref 0.1–1.2)
ALBUMIN SERPL-MCNC: 4.7 G/DL (ref 3.5–5.2)
ALBUMIN/GLOBULIN RATIO: 1.8 (ref 1–2.5)
ALP BLD-CCNC: 79 U/L (ref 35–104)
ALT SERPL-CCNC: 13 U/L (ref 5–33)
ANION GAP SERPL CALCULATED.3IONS-SCNC: 13 MMOL/L (ref 9–17)
AST SERPL-CCNC: 19 U/L
BASOPHILS # BLD: 2 % (ref 0–2)
BASOPHILS ABSOLUTE: 0.1 K/UL (ref 0–0.2)
BILIRUB SERPL-MCNC: 0.7 MG/DL (ref 0.3–1.2)
BUN BLDV-MCNC: 20 MG/DL (ref 6–20)
BUN/CREAT BLD: ABNORMAL (ref 9–20)
CALCIUM SERPL-MCNC: 9.7 MG/DL (ref 8.6–10.4)
CHLORIDE BLD-SCNC: 102 MMOL/L (ref 98–107)
CO2: 25 MMOL/L (ref 20–31)
CREAT SERPL-MCNC: 0.86 MG/DL (ref 0.5–0.9)
DIFFERENTIAL TYPE: ABNORMAL
EOSINOPHILS RELATIVE PERCENT: 6 % (ref 1–4)
GFR AFRICAN AMERICAN: >60 ML/MIN
GFR NON-AFRICAN AMERICAN: >60 ML/MIN
GFR SERPL CREATININE-BSD FRML MDRD: ABNORMAL ML/MIN/{1.73_M2}
GFR SERPL CREATININE-BSD FRML MDRD: ABNORMAL ML/MIN/{1.73_M2}
GLUCOSE BLD-MCNC: 88 MG/DL (ref 70–99)
HCT VFR BLD CALC: 44.6 % (ref 36–46)
HEMOGLOBIN: 15 G/DL (ref 12–16)
IMMATURE GRANULOCYTES: ABNORMAL %
LYMPHOCYTES # BLD: 31 % (ref 24–44)
MCH RBC QN AUTO: 30.8 PG (ref 26–34)
MCHC RBC AUTO-ENTMCNC: 33.7 G/DL (ref 31–37)
MCV RBC AUTO: 91.4 FL (ref 80–100)
MONOCYTES # BLD: 9 % (ref 2–11)
NRBC AUTOMATED: ABNORMAL PER 100 WBC
PDW BLD-RTO: 13.7 % (ref 12.5–15.4)
PLATELET # BLD: 50 K/UL (ref 140–450)
PLATELET ESTIMATE: ABNORMAL
PMV BLD AUTO: 8.6 FL (ref 6–12)
POTASSIUM SERPL-SCNC: 3.5 MMOL/L (ref 3.7–5.3)
RBC # BLD: 4.88 M/UL (ref 4–5.2)
RBC # BLD: ABNORMAL 10*6/UL
SEG NEUTROPHILS: 52 % (ref 36–66)
SEGMENTED NEUTROPHILS ABSOLUTE COUNT: 2.4 K/UL (ref 1.8–7.7)
SODIUM BLD-SCNC: 140 MMOL/L (ref 135–144)
T3 FREE: 2.69 PG/ML (ref 2.02–4.43)
THYROXINE, FREE: 1.94 NG/DL (ref 0.93–1.7)
TOTAL PROTEIN: 7.3 G/DL (ref 6.4–8.3)
TSH SERPL DL<=0.05 MIU/L-ACNC: 3.17 MIU/L (ref 0.3–5)
WBC # BLD: 4.6 K/UL (ref 3.5–11)
WBC # BLD: ABNORMAL 10*3/UL

## 2021-07-29 PROCEDURE — 84481 FREE ASSAY (FT-3): CPT

## 2021-07-29 PROCEDURE — 84439 ASSAY OF FREE THYROXINE: CPT

## 2021-07-29 PROCEDURE — 85025 COMPLETE CBC W/AUTO DIFF WBC: CPT

## 2021-07-29 PROCEDURE — 80053 COMPREHEN METABOLIC PANEL: CPT

## 2021-07-29 PROCEDURE — 36415 COLL VENOUS BLD VENIPUNCTURE: CPT

## 2021-07-29 PROCEDURE — 86800 THYROGLOBULIN ANTIBODY: CPT

## 2021-07-29 PROCEDURE — 84443 ASSAY THYROID STIM HORMONE: CPT

## 2021-07-29 PROCEDURE — 86376 MICROSOMAL ANTIBODY EACH: CPT

## 2021-07-29 RX ORDER — HEPARIN SODIUM (PORCINE) LOCK FLUSH IV SOLN 100 UNIT/ML 100 UNIT/ML
500 SOLUTION INTRAVENOUS PRN
Status: CANCELLED | OUTPATIENT
Start: 2021-08-03

## 2021-07-29 RX ORDER — MEPERIDINE HYDROCHLORIDE 50 MG/ML
12.5 INJECTION INTRAMUSCULAR; INTRAVENOUS; SUBCUTANEOUS ONCE
Status: CANCELLED | OUTPATIENT
Start: 2021-08-03 | End: 2021-08-03

## 2021-07-29 RX ORDER — SODIUM CHLORIDE 9 MG/ML
25 INJECTION, SOLUTION INTRAVENOUS PRN
Status: CANCELLED | OUTPATIENT
Start: 2021-08-03

## 2021-07-29 RX ORDER — METHYLPREDNISOLONE SODIUM SUCCINATE 125 MG/2ML
125 INJECTION, POWDER, LYOPHILIZED, FOR SOLUTION INTRAMUSCULAR; INTRAVENOUS ONCE
Status: CANCELLED | OUTPATIENT
Start: 2021-08-03 | End: 2021-08-03

## 2021-07-29 RX ORDER — SODIUM CHLORIDE 9 MG/ML
INJECTION, SOLUTION INTRAVENOUS CONTINUOUS
Status: CANCELLED | OUTPATIENT
Start: 2021-08-03

## 2021-07-29 RX ORDER — SODIUM CHLORIDE 0.9 % (FLUSH) 0.9 %
5-40 SYRINGE (ML) INJECTION PRN
Status: CANCELLED | OUTPATIENT
Start: 2021-08-03

## 2021-07-29 RX ORDER — DIPHENHYDRAMINE HYDROCHLORIDE 50 MG/ML
50 INJECTION INTRAMUSCULAR; INTRAVENOUS ONCE
Status: CANCELLED | OUTPATIENT
Start: 2021-08-03 | End: 2021-08-03

## 2021-07-29 RX ORDER — DIPHENHYDRAMINE HYDROCHLORIDE 50 MG/ML
25 INJECTION INTRAMUSCULAR; INTRAVENOUS ONCE
Status: CANCELLED | OUTPATIENT
Start: 2021-08-03 | End: 2021-08-03

## 2021-07-29 RX ORDER — EPINEPHRINE 1 MG/ML
0.3 INJECTION, SOLUTION, CONCENTRATE INTRAVENOUS PRN
Status: CANCELLED | OUTPATIENT
Start: 2021-08-03

## 2021-07-29 RX ORDER — ACETAMINOPHEN 325 MG/1
650 TABLET ORAL ONCE
Status: CANCELLED | OUTPATIENT
Start: 2021-08-03

## 2021-07-29 NOTE — PROGRESS NOTES
The patient is calling complaining of severe fatigue and petechiae. Platelets are down to 50,000 from 200,000 last visit. The patient has recurrent ITP. She has been steroid refractory. She had a previous great response to rituximab. We will write for rituximab and see her soon as possible to get started. She is asked to contact us or go to the hospital with any significant bleeding.

## 2021-07-30 DIAGNOSIS — E04.9 THYROID ENLARGEMENT: Primary | ICD-10-CM

## 2021-07-30 LAB
THYROGLOBULIN AB: 202 IU/ML (ref 0–40)
THYROID PEROXIDASE (TPO) AB: 948 IU/ML (ref 0–25)

## 2021-08-03 ENCOUNTER — HOSPITAL ENCOUNTER (OUTPATIENT)
Age: 56
Discharge: HOME | End: 2021-08-03
Payer: MEDICARE

## 2021-08-03 ENCOUNTER — OFFICE VISIT (OUTPATIENT)
Dept: ONCOLOGY | Age: 56
End: 2021-08-03
Payer: MEDICARE

## 2021-08-03 VITALS
TEMPERATURE: 97.9 F | SYSTOLIC BLOOD PRESSURE: 166 MMHG | BODY MASS INDEX: 26.29 KG/M2 | HEART RATE: 61 BPM | DIASTOLIC BLOOD PRESSURE: 88 MMHG | RESPIRATION RATE: 16 BRPM | WEIGHT: 162.9 LBS

## 2021-08-03 DIAGNOSIS — D69.3 ACUTE ITP (HCC): ICD-10-CM

## 2021-08-03 DIAGNOSIS — D69.3 ACUTE ITP (HCC): Primary | ICD-10-CM

## 2021-08-03 DIAGNOSIS — E04.9 THYROID ENLARGEMENT: ICD-10-CM

## 2021-08-03 LAB
ABSOLUTE EOS #: 0.3 K/UL (ref 0–0.4)
ABSOLUTE IMMATURE GRANULOCYTE: ABNORMAL K/UL (ref 0–0.3)
ABSOLUTE LYMPH #: 1.7 K/UL (ref 1–4.8)
ABSOLUTE MONO #: 0.4 K/UL (ref 0.1–1.2)
BASOPHILS # BLD: 1 % (ref 0–2)
BASOPHILS ABSOLUTE: 0.1 K/UL (ref 0–0.2)
DIFFERENTIAL TYPE: ABNORMAL
EOSINOPHILS RELATIVE PERCENT: 5 % (ref 1–4)
HCT VFR BLD CALC: 43.4 % (ref 36–46)
HEMOGLOBIN: 14.6 G/DL (ref 12–16)
IMMATURE GRANULOCYTES: ABNORMAL %
LYMPHOCYTES # BLD: 30 % (ref 24–44)
MCH RBC QN AUTO: 30.9 PG (ref 26–34)
MCHC RBC AUTO-ENTMCNC: 33.6 G/DL (ref 31–37)
MCV RBC AUTO: 92 FL (ref 80–100)
MONOCYTES # BLD: 8 % (ref 2–11)
NRBC AUTOMATED: ABNORMAL PER 100 WBC
PDW BLD-RTO: 14.3 % (ref 12.5–15.4)
PLATELET # BLD: 52 K/UL (ref 140–450)
PLATELET ESTIMATE: ABNORMAL
PMV BLD AUTO: 8.9 FL (ref 6–12)
RBC # BLD: 4.71 M/UL (ref 4–5.2)
RBC # BLD: ABNORMAL 10*6/UL
SEG NEUTROPHILS: 56 % (ref 36–66)
SEGMENTED NEUTROPHILS ABSOLUTE COUNT: 3.3 K/UL (ref 1.8–7.7)
WBC # BLD: 5.7 K/UL (ref 3.5–11)
WBC # BLD: ABNORMAL 10*3/UL

## 2021-08-03 PROCEDURE — 99211 OFF/OP EST MAY X REQ PHY/QHP: CPT | Performed by: INTERNAL MEDICINE

## 2021-08-03 PROCEDURE — G8427 DOCREV CUR MEDS BY ELIG CLIN: HCPCS | Performed by: INTERNAL MEDICINE

## 2021-08-03 PROCEDURE — G8419 CALC BMI OUT NRM PARAM NOF/U: HCPCS | Performed by: INTERNAL MEDICINE

## 2021-08-03 PROCEDURE — 85025 COMPLETE CBC W/AUTO DIFF WBC: CPT

## 2021-08-03 PROCEDURE — 3017F COLORECTAL CA SCREEN DOC REV: CPT | Performed by: INTERNAL MEDICINE

## 2021-08-03 PROCEDURE — 36415 COLL VENOUS BLD VENIPUNCTURE: CPT

## 2021-08-03 PROCEDURE — 99214 OFFICE O/P EST MOD 30 MIN: CPT | Performed by: INTERNAL MEDICINE

## 2021-08-03 PROCEDURE — 1036F TOBACCO NON-USER: CPT | Performed by: INTERNAL MEDICINE

## 2021-08-03 NOTE — PATIENT INSTRUCTIONS
Needs CBC today, please share the results with me  If the platelets remain around the 50,000 range, needs monthly CBC and we will see her back in 3 months  If the platelet drop under 16,599, plan to treat with rituximab, see orders.   If she started therapy, I will see her back with the first day of treatment

## 2021-08-03 NOTE — PROGRESS NOTES
DIAGNOSIS:   1. Severe thrombocytopenia, likely ITP with petechia and bruising, no active bleeding, steroid dependent  2. Hashimoto thyroiditis     CURRENT THERAPY:  1. Start steroids course (high dose dex)  07/31/2018 - good initial response  2. Transition to Rituxan, with excellent response       BRIEF CASE HISTORY:  Esvin Mooney is a very pleasant 54 y.o. female who is referred to us for thrombocytopenia. She recently had CBC via PCP which showed PLT count at 5. She has no other history of lab work. She notes she has petechia ecchymosis which started about a year ago and has been bruising easily over the last weeks. She was on steroids for shoulder dislocation a few moths ago and had GI distress. She reports the bruising started after that. She had hypothyroidism and takes Synthroid. She has not started new medication recently and does not take any blood thinners or pain medication. She has no history of stroke, heart attack, previous blood disorders, diabetes, or cancer. She does not drink alcohol or smoke cigarettes. Started on steroids 07/31/2018 - good initial response. She was found to be steroid dependent, plan for Rituxan. Excellent response. She had persistent bone pain and bone marrow biopsy was done and was negative. Her nephew was diagnosed with ITP 11/2019 at age 55.    she was later diagnosed with hashimoto thyroiditis. INTERIM HISTORY: The patient presents for follow up for ITP and to review lab work. She was feeling well until Sunday when she suddenly felt acutely fatigued and had similar symptoms when she had ITP. She came and and had CBC done and her platelets dropped from 210 in April down to 50,000. Hemoglobin and white count are stable. We diagnosed recurrent ITP and will see if she needs treatment. She presents today, she has diffuse bruising but no active bleeding. She is very tired.     PAST MEDICAL HISTORY: has a past medical history of Acute ITP ([address] Atrial fibrillation ([facility] [address] Hyperthyroidism, and Wears dentures. PAST SURGICAL HISTORY: has a past surgical history that includes  section (). CURRENT MEDICATIONS:  has a current medication list which includes the following prescription(s): levothyroxine, omeprazole, hydrochlorothiazide, diclofenac sodium, wheat dextrin-calcium, vitamin d3, biotin, potassium, and homeopathic products. ALLERGIES:  is allergic to bee venom and nuts [peanut-containing drug products]. FAMILY HISTORY: Mother and nephew: colon cancer; Aunt: bladder cancer; mother, aunt, 2 nieces with Hashimoto's; nephew ITP     SOCIAL HISTORY:  reports that she has never smoked. She has never used smokeless tobacco. She reports current alcohol use. She reports that she does not use drugs. REVIEW OF SYSTEMS:   General: No fever or night sweats. Weight is stable. Worsening fatigue  ENT: No double or blurred vision, no tinnitus or hearing problem, no dysphagia or sore throat   Respiratory: No chest pain, no shortness of breath, no cough or hemoptysis. Cardiovascular: Denies chest pain, PND or orthopnea. No L E swelling or palpitations. Gastrointestinal:  No nausea or vomiting, diarrhea or constipation. +intermittent abdominal pain - resolved  Genitourinary: Denies dysuria, hematuria, frequency, urgency or incontinence. Neurological: Denies headaches, decreased LOC, no sensory or motor focal deficits. Musculoskeletal:  No arthralgia no back pain or joint swelling. Skin: There are no rashes or bleeding. Psychiatric:  No anxiety, no depression. Endocrine: No diabetes or thyroid disease. Hematologic: No bleeding, no adenopathy. PHYSICAL EXAM: Shows a well appearing 54y.o.-year-old female who is not in pain or distress. Vital Signs: Blood pressure (!) 166/88, pulse 61, temperature 97.9 °F (36.6 °C), temperature source Oral, resp. rate 16, weight 162 lb 14.4 oz (73.9 kg), not currently breastfeeding.  HEENT: Normocephalic and atraumatic. Pupils are equal, round, reactive to light and accommodation. Extraocular muscles are intact. Neck: Showed no JVD, no carotid bruit . Lungs: Clear to auscultation bilaterally. Heart: Regular without any murmur. Abdomen: Soft, mild tenderness LUQ. No hepatosplenomegaly. Extremities: No point tenderness in knee or hip. Lower extremities show no edema, clubbing, or cyanosis. Breasts: Examination not done today. Neuro exam: intact cranial nerves bilaterally no motor or sensory deficit, gait is normal. Lymphatic: no adenopathy appreciated in the supraclavicular, axillary, cervical or inguinal area Skin: petechia and bruising; bone marrow site is well healed    REVIEW OF LABORATORY DATA:   Lab Results   Component Value Date    WBC 4.6 07/29/2021    HGB 15.0 07/29/2021    HCT 44.6 07/29/2021    MCV 91.4 07/29/2021    PLT 50 (L) 07/29/2021           Chemistry        Component Value Date/Time     07/29/2021 1041    K 3.5 (L) 07/29/2021 1041     07/29/2021 1041    CO2 25 07/29/2021 1041    BUN 20 07/29/2021 1041    CREATININE 0.86 07/29/2021 1041        Component Value Date/Time    CALCIUM 9.7 07/29/2021 1041    ALKPHOS 79 07/29/2021 1041    AST 19 07/29/2021 1041    ALT 13 07/29/2021 1041    BILITOT 0.70 07/29/2021 1041          PATHOLOGY:     REVIEW OF RADIOLOGICAL RESULTS:     IMPRESSION:   1. ITP, steroid refractory  2. Started 4 day course of steroids - good initial response. 3. Steroid dependent, plan to transition to Rituxan - excellent response  4. Persistent bone pain, bone marrow was done, negative, ITP active but controlled  5. Hashimoto thyroiditis, on synthroid     PLAN:   The patient has recurrent ITP. She is definitely symptomatic. Before this, she did not have a great response to steroid and she was steroid dependent. She had a great durable response to rituximab.   I think we could retreat her if her platelets continue to drop  We will check her CBC today and if platelets drop under 30,000, plan to retreat with rituximab. On the other hand, if they stabilize around 50,000, we will watch that monthly. And proceed with treatment if she has any symptoms or if the platelets drop further.

## 2021-08-04 NOTE — RESULT ENCOUNTER NOTE
Noted. Ordered (to be addressed/treated) per ordering provider. Hematology. Available if needed for correlation of care.

## 2021-09-07 ENCOUNTER — HOSPITAL ENCOUNTER (OUTPATIENT)
Age: 56
Discharge: HOME | End: 2021-09-07
Payer: MEDICARE

## 2021-09-07 DIAGNOSIS — E04.9 THYROID ENLARGEMENT: ICD-10-CM

## 2021-09-07 DIAGNOSIS — D69.3 ACUTE ITP (HCC): ICD-10-CM

## 2021-09-07 LAB
ABSOLUTE EOS #: 0.4 K/UL (ref 0–0.4)
ABSOLUTE IMMATURE GRANULOCYTE: ABNORMAL K/UL (ref 0–0.3)
ABSOLUTE LYMPH #: 1.6 K/UL (ref 1–4.8)
ABSOLUTE MONO #: 0.4 K/UL (ref 0.1–1.2)
ALBUMIN SERPL-MCNC: 4.7 G/DL (ref 3.5–5.2)
ALBUMIN/GLOBULIN RATIO: 1.6 (ref 1–2.5)
ALP BLD-CCNC: 75 U/L (ref 35–104)
ALT SERPL-CCNC: 11 U/L (ref 5–33)
ANION GAP SERPL CALCULATED.3IONS-SCNC: 12 MMOL/L (ref 9–17)
AST SERPL-CCNC: 14 U/L
BASOPHILS # BLD: 2 % (ref 0–2)
BASOPHILS ABSOLUTE: 0.1 K/UL (ref 0–0.2)
BILIRUB SERPL-MCNC: 0.42 MG/DL (ref 0.3–1.2)
BUN BLDV-MCNC: 16 MG/DL (ref 6–20)
BUN/CREAT BLD: ABNORMAL (ref 9–20)
CALCIUM SERPL-MCNC: 9.9 MG/DL (ref 8.6–10.4)
CHLORIDE BLD-SCNC: 98 MMOL/L (ref 98–107)
CO2: 26 MMOL/L (ref 20–31)
CREAT SERPL-MCNC: 0.82 MG/DL (ref 0.5–0.9)
DIFFERENTIAL TYPE: ABNORMAL
EOSINOPHILS RELATIVE PERCENT: 7 % (ref 1–4)
GFR AFRICAN AMERICAN: >60 ML/MIN
GFR NON-AFRICAN AMERICAN: >60 ML/MIN
GFR SERPL CREATININE-BSD FRML MDRD: ABNORMAL ML/MIN/{1.73_M2}
GFR SERPL CREATININE-BSD FRML MDRD: ABNORMAL ML/MIN/{1.73_M2}
GLUCOSE BLD-MCNC: 87 MG/DL (ref 70–99)
HCT VFR BLD CALC: 44.7 % (ref 36–46)
HEMOGLOBIN: 15.2 G/DL (ref 12–16)
IMMATURE GRANULOCYTES: ABNORMAL %
LYMPHOCYTES # BLD: 29 % (ref 24–44)
MCH RBC QN AUTO: 31.1 PG (ref 26–34)
MCHC RBC AUTO-ENTMCNC: 34 G/DL (ref 31–37)
MCV RBC AUTO: 91.4 FL (ref 80–100)
MONOCYTES # BLD: 7 % (ref 2–11)
NRBC AUTOMATED: ABNORMAL PER 100 WBC
PDW BLD-RTO: 13.9 % (ref 12.5–15.4)
PLATELET # BLD: 39 K/UL (ref 140–450)
PLATELET ESTIMATE: ABNORMAL
PMV BLD AUTO: 9.4 FL (ref 6–12)
POTASSIUM SERPL-SCNC: 3.5 MMOL/L (ref 3.7–5.3)
RBC # BLD: 4.9 M/UL (ref 4–5.2)
RBC # BLD: ABNORMAL 10*6/UL
SEG NEUTROPHILS: 55 % (ref 36–66)
SEGMENTED NEUTROPHILS ABSOLUTE COUNT: 3 K/UL (ref 1.8–7.7)
SODIUM BLD-SCNC: 136 MMOL/L (ref 135–144)
TOTAL PROTEIN: 7.6 G/DL (ref 6.4–8.3)
WBC # BLD: 5.4 K/UL (ref 3.5–11)
WBC # BLD: ABNORMAL 10*3/UL

## 2021-09-07 PROCEDURE — 80053 COMPREHEN METABOLIC PANEL: CPT

## 2021-09-07 PROCEDURE — 36415 COLL VENOUS BLD VENIPUNCTURE: CPT

## 2021-09-07 PROCEDURE — 85025 COMPLETE CBC W/AUTO DIFF WBC: CPT

## 2021-09-07 NOTE — RESULT ENCOUNTER NOTE
Noted. Ordered (to be addressed/treated) per ordering provider. Available if needed for correlation of care.

## 2021-10-02 DIAGNOSIS — Z86.2 HISTORY OF ITP: ICD-10-CM

## 2021-10-02 DIAGNOSIS — I10 ESSENTIAL HYPERTENSION: ICD-10-CM

## 2021-10-02 DIAGNOSIS — S49.91XA INJURY OF RIGHT SHOULDER, INITIAL ENCOUNTER: ICD-10-CM

## 2021-10-04 RX ORDER — OMEPRAZOLE 20 MG/1
20 CAPSULE, DELAYED RELEASE ORAL DAILY
Qty: 30 CAPSULE | Refills: 3 | Status: SHIPPED | OUTPATIENT
Start: 2021-10-04 | End: 2022-02-01 | Stop reason: SDUPTHER

## 2021-10-04 RX ORDER — HYDROCHLOROTHIAZIDE 25 MG/1
25 TABLET ORAL DAILY
Qty: 90 TABLET | Refills: 1 | Status: SHIPPED | OUTPATIENT
Start: 2021-10-04 | End: 2022-02-03 | Stop reason: SDUPTHER

## 2021-10-04 NOTE — TELEPHONE ENCOUNTER
Last visit: 5/11/21  Last Med refill:4/5/21hctzm 1/13/21voltaren  Does patient have enough medication for 72 hours: No:     Next Visit Date:  Future Appointments   Date Time Provider Leon Light   11/2/2021 10:15 AM MD Yang Berkowitz Maintenance   Topic Date Due    COVID-19 Vaccine (1) Never done    Cervical cancer screen  Never done    Breast cancer screen  Never done    Colon Cancer Screen FIT/FOBT  07/30/2019    Flu vaccine (1) 09/01/2021    Shingles Vaccine (1 of 2) 05/11/2022 (Originally 8/12/2015)    TSH testing  07/29/2022    Potassium monitoring  09/07/2022    Creatinine monitoring  09/07/2022    DTaP/Tdap/Td vaccine (2 - Td or Tdap) 10/31/2023    Lipid screen  02/28/2025    Hepatitis C screen  Completed    HIV screen  Completed    Hepatitis A vaccine  Aged Out    Hepatitis B vaccine  Aged Out    Hib vaccine  Aged Out    Meningococcal (ACWY) vaccine  Aged Out    Pneumococcal 0-64 years Vaccine  Aged Out       No results found for: LABA1C          ( goal A1C is < 7)   No results found for: LABMICR  LDL Cholesterol (mg/dL)   Date Value   02/29/2020 120   07/30/2018 142 (H)       (goal LDL is <100)   AST (U/L)   Date Value   09/07/2021 14     ALT (U/L)   Date Value   09/07/2021 11     BUN (mg/dL)   Date Value   09/07/2021 16     BP Readings from Last 3 Encounters:   08/03/21 (!) 166/88   05/11/21 116/64   12/15/20 (!) 154/98          (goal 120/80)    All Future Testing planned in [facility]  Lab Frequency Next Occurrence   Comprehensive Metabolic Panel, Fasting Once 05/11/2022   HENRIQUE DIGITAL SCREEN W OR WO CAD BILATERAL Once 07/11/2022   CBC With Auto Differential     Comprehensive Metabolic Panel                 Patient Active Problem List:     Chronic non-seasonal allergic rhinitis     Wears dentures     Family history of breast cancer in sister     Abnormal bruising     Acute ITP (HCC)     Thyroid enlargement     History of ITP     Mixed

## 2021-10-05 ENCOUNTER — HOSPITAL ENCOUNTER (OUTPATIENT)
Age: 56
Discharge: HOME | End: 2021-10-05
Payer: MEDICARE

## 2021-10-05 DIAGNOSIS — E04.9 THYROID ENLARGEMENT: ICD-10-CM

## 2021-10-05 LAB
ABSOLUTE EOS #: 0.4 K/UL (ref 0–0.4)
ABSOLUTE IMMATURE GRANULOCYTE: ABNORMAL K/UL (ref 0–0.3)
ABSOLUTE LYMPH #: 1.5 K/UL (ref 1–4.8)
ABSOLUTE MONO #: 0.4 K/UL (ref 0.1–1.2)
BASOPHILS # BLD: 1 % (ref 0–2)
BASOPHILS ABSOLUTE: 0 K/UL (ref 0–0.2)
DIFFERENTIAL TYPE: ABNORMAL
EOSINOPHILS RELATIVE PERCENT: 8 % (ref 1–4)
HCT VFR BLD CALC: 43.8 % (ref 36–46)
HEMOGLOBIN: 15.1 G/DL (ref 12–16)
IMMATURE GRANULOCYTES: ABNORMAL %
LYMPHOCYTES # BLD: 31 % (ref 24–44)
MCH RBC QN AUTO: 31.3 PG (ref 26–34)
MCHC RBC AUTO-ENTMCNC: 34.4 G/DL (ref 31–37)
MCV RBC AUTO: 91 FL (ref 80–100)
MONOCYTES # BLD: 9 % (ref 2–11)
NRBC AUTOMATED: ABNORMAL PER 100 WBC
PDW BLD-RTO: 14 % (ref 12.5–15.4)
PLATELET # BLD: 28 K/UL (ref 140–450)
PLATELET ESTIMATE: ABNORMAL
PMV BLD AUTO: 9.4 FL (ref 6–12)
RBC # BLD: 4.81 M/UL (ref 4–5.2)
RBC # BLD: ABNORMAL 10*6/UL
SEG NEUTROPHILS: 51 % (ref 36–66)
SEGMENTED NEUTROPHILS ABSOLUTE COUNT: 2.5 K/UL (ref 1.8–7.7)
WBC # BLD: 4.8 K/UL (ref 3.5–11)
WBC # BLD: ABNORMAL 10*3/UL

## 2021-10-05 PROCEDURE — 85025 COMPLETE CBC W/AUTO DIFF WBC: CPT

## 2021-10-05 PROCEDURE — 36415 COLL VENOUS BLD VENIPUNCTURE: CPT

## 2021-10-16 DIAGNOSIS — S49.91XA INJURY OF RIGHT SHOULDER, INITIAL ENCOUNTER: ICD-10-CM

## 2021-10-18 NOTE — TELEPHONE ENCOUNTER
LOV 5/11/21  RTO Not listed  LRF 10/04/21    Health Maintenance   Topic Date Due    COVID-19 Vaccine (1) Never done    Cervical cancer screen  Never done    Breast cancer screen  Never done    Colon Cancer Screen FIT/FOBT  07/30/2019    Flu vaccine (1) 09/01/2021    Shingles Vaccine (1 of 2) 05/11/2022 (Originally 8/12/2015)    TSH testing  07/29/2022    Potassium monitoring  09/07/2022    Creatinine monitoring  09/07/2022    DTaP/Tdap/Td vaccine (2 - Td or Tdap) 10/31/2023    Lipid screen  02/28/2025    Hepatitis C screen  Completed    HIV screen  Completed    Hepatitis A vaccine  Aged Out    Hepatitis B vaccine  Aged Out    Hib vaccine  Aged Out    Meningococcal (ACWY) vaccine  Aged Out    Pneumococcal 0-64 years Vaccine  Aged Out             (applicable per patient's age: Cancer Screenings, Depression Screening, Fall Risk Screening, Immunizations)    LDL Cholesterol (mg/dL)   Date Value   02/29/2020 120     AST (U/L)   Date Value   09/07/2021 14     ALT (U/L)   Date Value   09/07/2021 11     BUN (mg/dL)   Date Value   09/07/2021 16      (goal A1C is < 7)   (goal LDL is <100) need 30-50% reduction from baseline     BP Readings from Last 3 Encounters:   08/03/21 (!) 166/88   05/11/21 116/64   12/15/20 (!) 154/98    (goal /80)      All Future Testing planned in [facility]:  Lab Frequency Next Occurrence   Comprehensive Metabolic Panel, Fasting Once 05/11/2022   HENRIQUE DIGITAL SCREEN W OR WO CAD BILATERAL Once 07/11/2022   CBC With Auto Differential     Comprehensive Metabolic Panel         Next Visit Date:  Future Appointments   Date Time Provider Leon Light   11/2/2021 10:15 AM Shelby Powell  [facility]            Patient Active Problem List:     Chronic non-seasonal allergic rhinitis     Wears dentures     Family history of breast cancer in sister     Abnormal bruising     Acute ITP (HCC)     Thyroid enlargement     History of ITP     Mixed hyperlipidemia     Essential hypertension     Personal history of diseases of the blood and blood-forming organs and certain disorders involving the immune mechanism     Hypothyroidism due to Hashimoto's thyroiditis     Fatigue

## 2021-11-02 ENCOUNTER — TELEPHONE (OUTPATIENT)
Dept: ONCOLOGY | Age: 56
End: 2021-11-02

## 2021-11-02 ENCOUNTER — HOSPITAL ENCOUNTER (OUTPATIENT)
Age: 56
Discharge: HOME | End: 2021-11-02
Payer: MEDICARE

## 2021-11-02 ENCOUNTER — OFFICE VISIT (OUTPATIENT)
Dept: ONCOLOGY | Age: 56
End: 2021-11-02
Payer: MEDICARE

## 2021-11-02 VITALS
HEART RATE: 64 BPM | DIASTOLIC BLOOD PRESSURE: 64 MMHG | SYSTOLIC BLOOD PRESSURE: 159 MMHG | BODY MASS INDEX: 26.37 KG/M2 | TEMPERATURE: 96.4 F | WEIGHT: 163.4 LBS

## 2021-11-02 DIAGNOSIS — E04.9 THYROID ENLARGEMENT: ICD-10-CM

## 2021-11-02 DIAGNOSIS — D69.3 CHRONIC ITP (IDIOPATHIC THROMBOCYTOPENIA) (HCC): Primary | ICD-10-CM

## 2021-11-02 LAB
ABSOLUTE EOS #: 0.3 K/UL (ref 0–0.4)
ABSOLUTE IMMATURE GRANULOCYTE: ABNORMAL K/UL (ref 0–0.3)
ABSOLUTE LYMPH #: 1.6 K/UL (ref 1–4.8)
ABSOLUTE MONO #: 0.4 K/UL (ref 0.1–1.2)
BASOPHILS # BLD: 1 % (ref 0–2)
BASOPHILS ABSOLUTE: 0.1 K/UL (ref 0–0.2)
DIFFERENTIAL TYPE: ABNORMAL
EOSINOPHILS RELATIVE PERCENT: 5 % (ref 1–4)
HCT VFR BLD CALC: 43.9 % (ref 36–46)
HEMOGLOBIN: 14.8 G/DL (ref 12–16)
IMMATURE GRANULOCYTES: ABNORMAL %
LYMPHOCYTES # BLD: 31 % (ref 24–44)
MCH RBC QN AUTO: 30.8 PG (ref 26–34)
MCHC RBC AUTO-ENTMCNC: 33.6 G/DL (ref 31–37)
MCV RBC AUTO: 91.8 FL (ref 80–100)
MONOCYTES # BLD: 8 % (ref 2–11)
NRBC AUTOMATED: ABNORMAL PER 100 WBC
PDW BLD-RTO: 13.9 % (ref 12.5–15.4)
PLATELET # BLD: 54 K/UL (ref 140–450)
PLATELET ESTIMATE: ABNORMAL
PMV BLD AUTO: 8.7 FL (ref 6–12)
RBC # BLD: 4.79 M/UL (ref 4–5.2)
RBC # BLD: ABNORMAL 10*6/UL
SEG NEUTROPHILS: 55 % (ref 36–66)
SEGMENTED NEUTROPHILS ABSOLUTE COUNT: 2.9 K/UL (ref 1.8–7.7)
WBC # BLD: 5.3 K/UL (ref 3.5–11)
WBC # BLD: ABNORMAL 10*3/UL

## 2021-11-02 PROCEDURE — 99214 OFFICE O/P EST MOD 30 MIN: CPT | Performed by: INTERNAL MEDICINE

## 2021-11-02 PROCEDURE — 36415 COLL VENOUS BLD VENIPUNCTURE: CPT

## 2021-11-02 PROCEDURE — G8427 DOCREV CUR MEDS BY ELIG CLIN: HCPCS | Performed by: INTERNAL MEDICINE

## 2021-11-02 PROCEDURE — G8484 FLU IMMUNIZE NO ADMIN: HCPCS | Performed by: INTERNAL MEDICINE

## 2021-11-02 PROCEDURE — 85025 COMPLETE CBC W/AUTO DIFF WBC: CPT

## 2021-11-02 PROCEDURE — 3017F COLORECTAL CA SCREEN DOC REV: CPT | Performed by: INTERNAL MEDICINE

## 2021-11-02 PROCEDURE — G8419 CALC BMI OUT NRM PARAM NOF/U: HCPCS | Performed by: INTERNAL MEDICINE

## 2021-11-02 PROCEDURE — 1036F TOBACCO NON-USER: CPT | Performed by: INTERNAL MEDICINE

## 2021-11-02 NOTE — TELEPHONE ENCOUNTER
AVS from 11/2/21    Continue monthly cbc, rv to see me in 3 months  Plan to start treatment with rituxan if plt <20 or if she has bleeding symptoms    RV scheduled 2/1/21 @ 1;30pm    AVS given to  to follow precert     PT was given AVS and an appt schedule    Electronically signed by Tammie Rubalcava on 11/2/2021 at 12:24 PM

## 2021-11-02 NOTE — PATIENT INSTRUCTIONS
Continue monthly cbc, rv to see me in 3 months   Plan to start treatment with rituxan if plt <20 or if she has bleeding symptoms

## 2021-11-02 NOTE — PROGRESS NOTES
DIAGNOSIS:   1. Severe thrombocytopenia, likely ITP with petechia and bruising, no active bleeding, steroid dependent  2. Hashimoto thyroiditis     CURRENT THERAPY:  1. Start steroids course (high dose dex)  2018 - good initial response  2. Transition to Rituxan, with excellent response, completed 2018      BRIEF CASE HISTORY:  Chelsea Dee is a very pleasant 64 y.o. female who is referred to us for thrombocytopenia. She recently had CBC via PCP which showed PLT count at 5. She has no other history of lab work. She notes she has petechia ecchymosis which started about a year ago and has been bruising easily over the last weeks. She was on steroids for shoulder dislocation a few moths ago and had GI distress. She reports the bruising started after that. She had hypothyroidism and takes Synthroid. She has not started new medication recently and does not take any blood thinners or pain medication. She has no history of stroke, heart attack, previous blood disorders, diabetes, or cancer. She does not drink alcohol or smoke cigarettes. Started on steroids 2018 - good initial response. She was found to be steroid dependent, plan for Rituxan. Excellent response. She had persistent bone pain and bone marrow biopsy was done and was negative. Her nephew was diagnosed with ITP 2019 at age 55.    she was later diagnosed with hashimoto thyroiditis. INTERIM HISTORY: The patient presents for follow up for ITP and to review lab work. She was feeling well and has no fatigue or bleeding tendency. Platelets have been fluctuating between 20 and 50,000. PAST MEDICAL HISTORY: has a past medical history of Acute ITP ([address] Atrial fibrillation ([address] Hyperthyroidism, and Wears dentures. PAST SURGICAL HISTORY: has a past surgical history that includes  section ().      CURRENT MEDICATIONS:  has a current medication list which includes the following prescription(s): diclofenac sodium, hydrochlorothiazide, omeprazole, levothyroxine, wheat dextrin-calcium, vitamin d3, biotin, potassium, and homeopathic products. ALLERGIES:  is allergic to bee venom and nuts [peanut-containing drug products]. FAMILY HISTORY: Mother and nephew: colon cancer; Aunt: bladder cancer; mother, aunt, 2 nieces with Hashimoto's; nephew ITP     SOCIAL HISTORY:  reports that she has never smoked. She has never used smokeless tobacco. She reports current alcohol use. She reports that she does not use drugs. REVIEW OF SYSTEMS:   General: No fever or night sweats. Weight is stable. Worsening fatigue  ENT: No double or blurred vision, no tinnitus or hearing problem, no dysphagia or sore throat   Respiratory: No chest pain, no shortness of breath, no cough or hemoptysis. Cardiovascular: Denies chest pain, PND or orthopnea. No L E swelling or palpitations. Gastrointestinal:  No nausea or vomiting, diarrhea or constipation. +intermittent abdominal pain - resolved  Genitourinary: Denies dysuria, hematuria, frequency, urgency or incontinence. Neurological: Denies headaches, decreased LOC, no sensory or motor focal deficits. Musculoskeletal:  No arthralgia no back pain or joint swelling. Skin: There are no rashes or bleeding. Psychiatric:  No anxiety, no depression. Endocrine: No diabetes or thyroid disease. Hematologic: No bleeding, no adenopathy. PHYSICAL EXAM: Shows a well appearing 64y.o.-year-old female who is not in pain or distress. Vital Signs: Blood pressure (!) 159/64, pulse 64, temperature 96.4 °F (35.8 °C), temperature source Temporal, weight 163 lb 6.4 oz (74.1 kg), not currently breastfeeding. HEENT: Normocephalic and atraumatic. Pupils are equal, round, reactive to light and accommodation. Extraocular muscles are intact. Neck: Showed no JVD, no carotid bruit . Lungs: Clear to auscultation bilaterally. Heart: Regular without any murmur. Abdomen: Soft, mild tenderness LUQ.  No hepatosplenomegaly. Extremities: No point tenderness in knee or hip. Lower extremities show no edema, clubbing, or cyanosis. Breasts: Examination not done today. Neuro exam: intact cranial nerves bilaterally no motor or sensory deficit, gait is normal. Lymphatic: no adenopathy appreciated in the supraclavicular, axillary, cervical or inguinal area Skin: petechia and bruising; bone marrow site is well healed    REVIEW OF LABORATORY DATA:   Lab Results   Component Value Date    WBC 5.3 11/02/2021    HGB 14.8 11/02/2021    HCT 43.9 11/02/2021    MCV 91.8 11/02/2021    PLT 54 (L) 11/02/2021           Chemistry        Component Value Date/Time     09/07/2021 0825    K 3.5 (L) 09/07/2021 0825    CL 98 09/07/2021 0825    CO2 26 09/07/2021 0825    BUN 16 09/07/2021 0825    CREATININE 0.82 09/07/2021 0825        Component Value Date/Time    CALCIUM 9.9 09/07/2021 0825    ALKPHOS 75 09/07/2021 0825    AST 14 09/07/2021 0825    ALT 11 09/07/2021 0825    BILITOT 0.42 09/07/2021 0825          PATHOLOGY:     REVIEW OF RADIOLOGICAL RESULTS:     IMPRESSION:   1. ITP, steroid refractory  2. Started 4 day course of steroids - good initial response. 3. Steroid dependent, plan to transition to Rituxan - excellent response  4. Persistent bone pain, bone marrow was done, negative, ITP active but controlled  5. Hashimoto thyroiditis, on synthroid     PLAN:   The patient has recurrent ITP. She is definitely asymptomatic. The patient is interested in continued observation and only to proceed with therapy if she has any bleeding or bruising. Considering that her platelets have been fluctuating between 20 and 50,000, I think that is appropriate.   We will watch her closely with monthly CBC and proceed with treatment with rituximab if she has further thrombocytopenia especially if she has any symptoms

## 2021-11-08 ENCOUNTER — NURSE ONLY (OUTPATIENT)
Dept: FAMILY MEDICINE CLINIC | Age: 56
End: 2021-11-08
Payer: MEDICARE

## 2021-11-08 VITALS — TEMPERATURE: 98.2 F | HEART RATE: 62 BPM | OXYGEN SATURATION: 98 %

## 2021-11-08 DIAGNOSIS — Z23 NEED FOR INFLUENZA VACCINATION: Primary | ICD-10-CM

## 2021-11-08 PROCEDURE — 90674 CCIIV4 VAC NO PRSV 0.5 ML IM: CPT | Performed by: NURSE PRACTITIONER

## 2021-11-08 PROCEDURE — 90471 IMMUNIZATION ADMIN: CPT | Performed by: NURSE PRACTITIONER

## 2021-11-30 ENCOUNTER — HOSPITAL ENCOUNTER (OUTPATIENT)
Age: 56
Discharge: HOME | End: 2021-11-30
Payer: MEDICARE

## 2021-11-30 DIAGNOSIS — D69.3 CHRONIC ITP (IDIOPATHIC THROMBOCYTOPENIA) (HCC): ICD-10-CM

## 2021-11-30 LAB
ABSOLUTE EOS #: 0.3 K/UL (ref 0–0.4)
ABSOLUTE IMMATURE GRANULOCYTE: ABNORMAL K/UL (ref 0–0.3)
ABSOLUTE LYMPH #: 1.8 K/UL (ref 1–4.8)
ABSOLUTE MONO #: 0.3 K/UL (ref 0.1–1.2)
ALBUMIN SERPL-MCNC: 4.4 G/DL (ref 3.5–5.2)
ALBUMIN/GLOBULIN RATIO: 1.6 (ref 1–2.5)
ALP BLD-CCNC: 68 U/L (ref 35–104)
ALT SERPL-CCNC: 13 U/L (ref 5–33)
ANION GAP SERPL CALCULATED.3IONS-SCNC: 11 MMOL/L (ref 9–17)
AST SERPL-CCNC: 16 U/L
BASOPHILS # BLD: 1 % (ref 0–2)
BASOPHILS ABSOLUTE: 0.1 K/UL (ref 0–0.2)
BILIRUB SERPL-MCNC: 0.55 MG/DL (ref 0.3–1.2)
BUN BLDV-MCNC: 13 MG/DL (ref 6–20)
BUN/CREAT BLD: NORMAL (ref 9–20)
CALCIUM SERPL-MCNC: 9.7 MG/DL (ref 8.6–10.4)
CHLORIDE BLD-SCNC: 101 MMOL/L (ref 98–107)
CO2: 26 MMOL/L (ref 20–31)
CREAT SERPL-MCNC: 0.79 MG/DL (ref 0.5–0.9)
DIFFERENTIAL TYPE: ABNORMAL
EOSINOPHILS RELATIVE PERCENT: 5 % (ref 1–4)
GFR AFRICAN AMERICAN: >60 ML/MIN
GFR NON-AFRICAN AMERICAN: >60 ML/MIN
GFR SERPL CREATININE-BSD FRML MDRD: NORMAL ML/MIN/{1.73_M2}
GFR SERPL CREATININE-BSD FRML MDRD: NORMAL ML/MIN/{1.73_M2}
GLUCOSE BLD-MCNC: 96 MG/DL (ref 70–99)
HCT VFR BLD CALC: 43.2 % (ref 36–46)
HEMOGLOBIN: 14.6 G/DL (ref 12–16)
IMMATURE GRANULOCYTES: ABNORMAL %
LYMPHOCYTES # BLD: 38 % (ref 24–44)
MCH RBC QN AUTO: 30.9 PG (ref 26–34)
MCHC RBC AUTO-ENTMCNC: 33.7 G/DL (ref 31–37)
MCV RBC AUTO: 91.7 FL (ref 80–100)
MONOCYTES # BLD: 7 % (ref 2–11)
NRBC AUTOMATED: ABNORMAL PER 100 WBC
PDW BLD-RTO: 14.3 % (ref 12.5–15.4)
PLATELET # BLD: 30 K/UL (ref 140–450)
PLATELET ESTIMATE: ABNORMAL
PMV BLD AUTO: 10.6 FL (ref 6–12)
POTASSIUM SERPL-SCNC: 4.1 MMOL/L (ref 3.7–5.3)
RBC # BLD: 4.71 M/UL (ref 4–5.2)
RBC # BLD: ABNORMAL 10*6/UL
SEG NEUTROPHILS: 49 % (ref 36–66)
SEGMENTED NEUTROPHILS ABSOLUTE COUNT: 2.2 K/UL (ref 1.8–7.7)
SODIUM BLD-SCNC: 138 MMOL/L (ref 135–144)
TOTAL PROTEIN: 7.2 G/DL (ref 6.4–8.3)
WBC # BLD: 4.7 K/UL (ref 3.5–11)
WBC # BLD: ABNORMAL 10*3/UL

## 2021-11-30 PROCEDURE — 36415 COLL VENOUS BLD VENIPUNCTURE: CPT

## 2021-11-30 PROCEDURE — 80053 COMPREHEN METABOLIC PANEL: CPT

## 2021-11-30 PROCEDURE — 85025 COMPLETE CBC W/AUTO DIFF WBC: CPT

## 2021-12-09 ENCOUNTER — HOSPITAL ENCOUNTER (EMERGENCY)
Age: 56
Discharge: HOME | End: 2021-12-09
Attending: EMERGENCY MEDICINE
Payer: MEDICARE

## 2021-12-09 ENCOUNTER — APPOINTMENT (OUTPATIENT)
Dept: GENERAL RADIOLOGY | Age: 56
End: 2021-12-09
Payer: MEDICARE

## 2021-12-09 VITALS
RESPIRATION RATE: 14 BRPM | TEMPERATURE: 100 F | DIASTOLIC BLOOD PRESSURE: 64 MMHG | WEIGHT: 154 LBS | HEART RATE: 88 BPM | BODY MASS INDEX: 24.75 KG/M2 | OXYGEN SATURATION: 94 % | HEIGHT: 66 IN | SYSTOLIC BLOOD PRESSURE: 114 MMHG

## 2021-12-09 DIAGNOSIS — B34.9 VIRAL ILLNESS: Primary | ICD-10-CM

## 2021-12-09 LAB
-: ABNORMAL
AMORPHOUS: ABNORMAL
BACTERIA: ABNORMAL
BILIRUBIN URINE: ABNORMAL
CASTS UA: ABNORMAL /LPF
CASTS UA: ABNORMAL /LPF
COLOR: YELLOW
COMMENT UA: ABNORMAL
CRYSTALS, UA: ABNORMAL /HPF
DIRECT EXAM: NORMAL
EPITHELIAL CELLS UA: ABNORMAL /HPF (ref 0–5)
GLUCOSE URINE: NEGATIVE
KETONES, URINE: ABNORMAL
LEUKOCYTE ESTERASE, URINE: NEGATIVE
Lab: NORMAL
MUCUS: ABNORMAL
NITRITE, URINE: NEGATIVE
OTHER OBSERVATIONS UA: ABNORMAL
PH UA: 6 (ref 5–8)
PROTEIN UA: ABNORMAL
RBC UA: ABNORMAL /HPF (ref 0–2)
RENAL EPITHELIAL, UA: ABNORMAL /HPF
SPECIFIC GRAVITY UA: 1.02 (ref 1–1.03)
SPECIMEN DESCRIPTION: NORMAL
TRICHOMONAS: ABNORMAL
TURBIDITY: CLEAR
URINE HGB: ABNORMAL
UROBILINOGEN, URINE: NORMAL
WBC UA: ABNORMAL /HPF (ref 0–5)
YEAST: ABNORMAL

## 2021-12-09 PROCEDURE — 87804 INFLUENZA ASSAY W/OPTIC: CPT

## 2021-12-09 PROCEDURE — 99284 EMERGENCY DEPT VISIT MOD MDM: CPT

## 2021-12-09 PROCEDURE — 96374 THER/PROPH/DIAG INJ IV PUSH: CPT

## 2021-12-09 PROCEDURE — U0003 INFECTIOUS AGENT DETECTION BY NUCLEIC ACID (DNA OR RNA); SEVERE ACUTE RESPIRATORY SYNDROME CORONAVIRUS 2 (SARS-COV-2) (CORONAVIRUS DISEASE [COVID-19]), AMPLIFIED PROBE TECHNIQUE, MAKING USE OF HIGH THROUGHPUT TECHNOLOGIES AS DESCRIBED BY CMS-2020-01-R: HCPCS

## 2021-12-09 PROCEDURE — U0005 INFEC AGEN DETEC AMPLI PROBE: HCPCS

## 2021-12-09 PROCEDURE — 81001 URINALYSIS AUTO W/SCOPE: CPT

## 2021-12-09 PROCEDURE — 6360000002 HC RX W HCPCS

## 2021-12-09 PROCEDURE — 2580000003 HC RX 258: Performed by: NURSE PRACTITIONER

## 2021-12-09 PROCEDURE — 71045 X-RAY EXAM CHEST 1 VIEW: CPT

## 2021-12-09 RX ORDER — 0.9 % SODIUM CHLORIDE 0.9 %
1000 INTRAVENOUS SOLUTION INTRAVENOUS ONCE
Status: COMPLETED | OUTPATIENT
Start: 2021-12-09 | End: 2021-12-09

## 2021-12-09 RX ORDER — ONDANSETRON 4 MG/1
4 TABLET, ORALLY DISINTEGRATING ORAL 3 TIMES DAILY PRN
Qty: 21 TABLET | Refills: 0 | Status: SHIPPED | OUTPATIENT
Start: 2021-12-09 | End: 2022-04-25 | Stop reason: ALTCHOICE

## 2021-12-09 RX ORDER — AZITHROMYCIN 250 MG/1
TABLET, FILM COATED ORAL
Qty: 1 PACKET | Refills: 0 | Status: SHIPPED | OUTPATIENT
Start: 2021-12-09 | End: 2021-12-13

## 2021-12-09 RX ORDER — ONDANSETRON 2 MG/ML
INJECTION INTRAMUSCULAR; INTRAVENOUS
Status: COMPLETED
Start: 2021-12-09 | End: 2021-12-09

## 2021-12-09 RX ORDER — ONDANSETRON 2 MG/ML
4 INJECTION INTRAMUSCULAR; INTRAVENOUS ONCE
Status: COMPLETED | OUTPATIENT
Start: 2021-12-09 | End: 2021-12-09

## 2021-12-09 RX ADMIN — SODIUM CHLORIDE 1000 ML: 9 INJECTION, SOLUTION INTRAVENOUS at 12:53

## 2021-12-09 RX ADMIN — ONDANSETRON 4 MG: 2 INJECTION INTRAMUSCULAR; INTRAVENOUS at 13:02

## 2021-12-09 ASSESSMENT — ENCOUNTER SYMPTOMS
APNEA: 0
COUGH: 1
RECTAL PAIN: 0
WHEEZING: 0
DIARRHEA: 0
SHORTNESS OF BREATH: 0
BLOOD IN STOOL: 0
EYES NEGATIVE: 1
CHOKING: 0
CONSTIPATION: 0
VOMITING: 0
CHEST TIGHTNESS: 0
ANAL BLEEDING: 0
ABDOMINAL DISTENTION: 0
NAUSEA: 1
STRIDOR: 0
ABDOMINAL PAIN: 0

## 2021-12-09 ASSESSMENT — PAIN DESCRIPTION - LOCATION: LOCATION: OTHER (COMMENT)

## 2021-12-09 ASSESSMENT — PAIN DESCRIPTION - PAIN TYPE: TYPE: ACUTE PAIN

## 2021-12-09 ASSESSMENT — PAIN SCALES - GENERAL: PAINLEVEL_OUTOF10: 7

## 2021-12-09 NOTE — ED PROVIDER NOTES
[address] [facility] ED  [address] [facility] [address] [facility] [address]  Phone: [phone]  Fax: [phone]        Pt Name: Harris Mcclain  MRN: [id number]  [place] 1965  Date of evaluation: 21    CHIEFCOMPLAINT       Chief Complaint   Patient presents with    Emesis    Fatigue       HISTORY OF PRESENT ILLNESS (Location/Symptom, Timing/Onset, Context/Setting, Quality, Duration, Modifying Factors, Severity)      Harris Mcclain is a 64 y.o. female with no pertinent PMH who presents to the ED via private auto with complaints of nausea, body aches, fever and new cough. Patient states the cough is nonproductive and occasional.  Patient states her highest temp has been 100 °F.  Patient states she has not vomited the last couple of days, symptoms have been present since Saturday. Patient denies any vomiting her urine, stool, vomit. Patient denies any chest pain or shortness of breath. Patient is not vaccinated against COVID-19, but is vaccinated gets influenza. Patient denies any dizziness or lightheadedness, but states she did have a headache which started last night and has continued to today, patient states she is prone to headaches but just feels generally dehydrated. Patient denies any abdominal pain or back pain. Patient denies any diarrhea. PAST MEDICAL / SURGICAL / SOCIAL / FAMILY HISTORY     PMH:  has a past medical history of Acute ITP ([address] Atrial fibrillation ([address] Hyperthyroidism, and Wears dentures. Surgical History:  has a past surgical history that includes  section (). Social History:  reports that she has never smoked. She has never used smokeless tobacco. She reports current alcohol use. She reports that she does not use drugs. Family History: She indicated that her mother is alive. She indicated that her father is . She indicated that her sister is alive. She indicated that four of her seven brothers are alive.  She indicated that her other is alive. family history includes Alcohol Abuse in her brother; Arthritis in her brother; Breast Cancer (age of onset: 61) in her sister; Cancer in her brother; Colon Cancer (age of onset: 44) in an other family member; Colon Cancer (age of onset: 79) in her mother; Depression in her brother; Diabetes in her father; Heart Attack in her father; Heart Disease in her brother; High Blood Pressure in her brother; Hypertension in her mother; Other in her brother; Thyroid Disease in her mother. Psychiatric History: None    Allergies: Bee venom and Nuts [peanut-containing drug products]    Home Medications:   Prior to Admission medications    Medication Sig Start Date End Date Taking?  Authorizing Provider   azithromycin (ZITHROMAX Z-SHEKHAR) 250 MG tablet Take 2 tablets (500 mg) on Day 1, and then take 1 tablet (250 mg) on days 2 through 5. 12/9/21 12/13/21 Yes JAYLENE Chaney NP   ondansetron (ZOFRAN-ODT) 4 MG disintegrating tablet Take 1 tablet by mouth 3 times daily as needed for Nausea or Vomiting 12/9/21  Yes JAYLENE Chaney NP   diclofenac sodium (VOLTAREN) 1 % GEL Apply 2 g topically 4 times daily as needed for Pain 10/4/21 10/4/22  JAYLENE Hernandez CNP   hydroCHLOROthiazide (HYDRODIURIL) 25 MG tablet Take 1 tablet by mouth daily 10/4/21 4/2/22  JAYLENE Hernandez CNP   omeprazole (PRILOSEC) 20 MG delayed release capsule Take 1 capsule by mouth Daily 10/4/21   Karson Jiang MD   levothyroxine (SYNTHROID) 75 MCG tablet Take 1 tablet by mouth daily 7/12/21 1/8/22  Jaime Valderrama MD   Wheat Dextrin-Calcium (BENEFIBER PLUS CALCIUM PO) Take by mouth daily    Historical Provider, MD   Cholecalciferol (VITAMIN D3) 2000 units CAPS Take 1 capsule by mouth daily    Historical Provider, MD   Biotin 5000 MCG CAPS Take 1 capsule by mouth    Historical Provider, MD   Potassium 99 MG TABS Take by mouth    Historical Provider, MD   Homeopathic Products ([address]) by Nasal route Indications: OTC allergy medication    Historical Provider, MD       REVIEW OF SYSTEMS  (2-9 systems for level 4, 10 ormore for level 5)      Review of Systems   Constitutional: Positive for appetite change, fatigue and fever. Negative for activity change, chills, diaphoresis and unexpected weight change. HENT: Negative. Eyes: Negative. Respiratory: Positive for cough. Negative for apnea, choking, chest tightness, shortness of breath, wheezing and stridor. Cardiovascular: Negative. Gastrointestinal: Positive for nausea. Negative for abdominal distention, abdominal pain, anal bleeding, blood in stool, constipation, diarrhea, rectal pain and vomiting. Endocrine: Negative. Genitourinary: Negative. Musculoskeletal: Negative. Skin: Negative. Neurological: Positive for headaches. Negative for dizziness, tremors, seizures, syncope, facial asymmetry, speech difficulty, weakness, light-headedness and numbness. Hematological: Negative. Psychiatric/Behavioral: Negative. All other systems negative except as marked. PHYSICAL EXAM  (up to 7 for level 4, 8 or more for level 5)      INITIAL VITALS:  height is 5' 6\" (1.676 m) and weight is 69.9 kg (154 lb). Her oral temperature is 100 °F (37.8 °C). Her blood pressure is 116/77 and her pulse is 88. Her respiration is 12 and oxygen saturation is 93%. Vital signs reviewed. Physical Exam  Constitutional:       Appearance: Normal appearance. HENT:      Head: Normocephalic. Right Ear: Tympanic membrane, ear canal and external ear normal.      Left Ear: Tympanic membrane, ear canal and external ear normal.      Nose: Nose normal.      Mouth/Throat:      Mouth: Mucous membranes are moist.      Pharynx: Oropharynx is clear. Eyes:      Extraocular Movements: Extraocular movements intact. Cardiovascular:      Rate and Rhythm: Normal rate and regular rhythm. Pulses: Normal pulses. Heart sounds: Normal heart sounds. Pulmonary:      Effort: Pulmonary effort is normal.      Breath sounds: Normal breath sounds. Abdominal:      General: Bowel sounds are normal.      Palpations: Abdomen is soft. Musculoskeletal:         General: Normal range of motion. Cervical back: Normal range of motion. Skin:     General: Skin is warm and dry. Capillary Refill: Capillary refill takes less than 2 seconds. Neurological:      Mental Status: She is alert and oriented to person, place, and time. Psychiatric:         Mood and Affect: Mood normal.         Behavior: Behavior normal.         Thought Content: Thought content normal.         Judgment: Judgment normal.           DIFFERENTIAL DIAGNOSIS / MDM     Upon exam, patient resting her room. Complaining of body aches and nausea at this time. Patient states she has not vomited today, nor had any episodes of diarrhea. Patient denies any pain with urination. Patient denies any history of kidney stones. Upon palpation, no abdominal pain noted. No CVA tenderness. Patient denies any back pain at this time. Heart and lung sounds are within normal limits upon auscultation, but given new cough will obtain chest x-ray. We will also test patient for COVID-19 and influenza A and B. Also obtain urinalysis. Will give patient a liter of fluid. 1400 -with reassessment, patient reports much improvement after Zofran and fluids. Chest x-ray does show possible pneumonia, this may be a correlation with the possible COVID-19 infection, though we will have to await PCR results. This is likely a viral illness. Influenza A and B both negative. Urinalysis did show some WBCs, but patient is not experiencing any symptoms. Some protein in blood noted as well, this may be due to possible LENY given dehydration status upon arrival to emergency department for recent emesis. We will send patient home on Zofran as well as Z-Rob.   Patient is educated to please monitor herself for worsening symptoms and return to the emergency department if they present. Patient is educated to obtain a pulse ox for home, if SPO2 drops below 92%, please return to the emergency department. Please follow-up with primary care within the next day or so. Please self quarantine until COVID-19 results are back. If patient is positive for COVID-19, she will need to self quarantine for 10 days beginning of symptoms. Patient has no further questions or concerns at this time, patient is stable for discharge. PLAN (LABS / IMAGING / EKG):  Orders Placed This Encounter   Procedures    Rapid Influenza A/B Antigens    XR CHEST PORTABLE    COVID-19    Urinalysis Reflex to Culture    Microscopic Urinalysis    Insert peripheral IV       MEDICATIONS ORDERED:  Orders Placed This Encounter   Medications    ondansetron (ZOFRAN) 4 MG/2ML injection     Lily Denneyard: cabinet override    0.9 % sodium chloride bolus    ondansetron (ZOFRAN) injection 4 mg    azithromycin (ZITHROMAX Z-SHEKHAR) 250 MG tablet     Sig: Take 2 tablets (500 mg) on Day 1, and then take 1 tablet (250 mg) on days 2 through 5. Dispense:  1 packet     Refill:  0    ondansetron (ZOFRAN-ODT) 4 MG disintegrating tablet     Sig: Take 1 tablet by mouth 3 times daily as needed for Nausea or Vomiting     Dispense:  21 tablet     Refill:  0       Controlled Substances Monitoring:     DIAGNOSTIC RESULTS     EKG: All EKG's are interpreted by the Emergency Department Physician who either signs or Co-signs this chart in the [address] a cardiologist.      RADIOLOGY: All images are read by the radiologist and their interpretations are reviewed. XR CHEST PORTABLE   Final Result   Increased lung markings bilaterally, may be related to mild pulmonary edema   versus pneumonia. No results found.     LABS:  Results for orders placed or performed during the hospital encounter of 12/09/21   Rapid Influenza A/B Antigens    Specimen: Nasopharyngeal Swab   Result Value Ref Range    Specimen Description . NASOPHARYNGEAL SWAB     Special Requests NOT REPORTED     Direct Exam       NEGATIVE for Influenza A + B antigens. PCR testing to confirm this result is available upon request.  Specimen will be saved in the laboratory for 7 days. Please call [phone] if PCR testing is indicated. Urinalysis Reflex to Culture    Specimen: Urine, clean catch   Result Value Ref Range    Color, UA Yellow Yellow    Turbidity UA Clear Clear    Glucose, Ur NEGATIVE NEGATIVE    Bilirubin Urine NEGATIVE  Verified by ictotest. (A) NEGATIVE    Ketones, Urine SMALL (A) NEGATIVE    Specific Gravity, UA 1.025 1.005 - 1.030    Urine Hgb TRACE (A) NEGATIVE    pH, UA 6.0 5.0 - 8.0    Protein, UA 2+ (A) NEGATIVE    Urobilinogen, Urine Normal Normal    Nitrite, Urine NEGATIVE NEGATIVE    Leukocyte Esterase, Urine NEGATIVE NEGATIVE    Urinalysis Comments NOT REPORTED    Microscopic Urinalysis   Result Value Ref Range    -          WBC, UA 5 TO 10 0 - 5 /HPF    RBC, UA 0 TO 2 0 - 2 /HPF    Casts UA 0 TO 2 /LPF    Casts UA COARSELY GRANULAR /LPF    Crystals, UA NOT REPORTED None /HPF    Epithelial Cells UA 0 TO 2 0 - 5 /HPF    Renal Epithelial, UA NOT REPORTED 0 /HPF    Bacteria, UA NOT REPORTED None    Mucus, UA NOT REPORTED None    Trichomonas, UA NOT REPORTED None    Amorphous, UA NOT REPORTED None    Other Observations UA (A) NOT REQ. Utilizing a urinalysis as the only screening method to exclude a potential uropathogen can be unreliable in many patient populations. Rapid screening tests are less sensitive than culture and if UTI is a clinical possibility, culture should be considered despite a negative urinalysis.     Yeast, UA NOT REPORTED None       EMERGENCY DEPARTMENT COURSE           Vitals:    Vitals:    12/09/21 1225   BP: 116/77   Pulse: 88   Resp: 12   Temp: 100 °F (37.8 °C)   TempSrc: Oral   SpO2: 93%   Weight: 69.9 kg (154 lb)   Height: 5' 6\" (1.676 m) -------------------------  BP: 116/77, Temp: 100 °F (37.8 °C), Pulse: 88, Resp: 12      RE-EVALUATION:  See ED Course notes above. CONSULTS:  None    PROCEDURES:  None    FINAL IMPRESSION      1. Viral illness          DISPOSITION / PLAN     CONDITION ON DISPOSITION:   Good / Stable for discharge. PATIENT REFERRED TO:  JAYLENE Stratton CNP  [facility] [phone]    Call in 1 day      [facility] ED  [address] [facility]. [address] [place] [address]  [phone]  Go to   If symptoms worsen      DISCHARGE MEDICATIONS:  New Prescriptions    AZITHROMYCIN (ZITHROMAX Z-SHEKHAR) 250 MG TABLET    Take 2 tablets (500 mg) on Day 1, and then take 1 tablet (250 mg) on days 2 through 5.     ONDANSETRON (ZOFRAN-ODT) 4 MG DISINTEGRATING TABLET    Take 1 tablet by mouth 3 times daily as needed for Nausea or Vomiting       JAYLENE Sommers - NP   Emergency Medicine Nurse Practitioner    (Please note that portions of this note were completed with a voice recognition program.  Efforts were made to edit the dictations but occasionally words aremis-transcribed.)     JAYLENE Sommers NP  12/09/21 [id number]

## 2021-12-09 NOTE — ED PROVIDER NOTES
[address] [facility] ED  [address] [facility] [address] [facility] [address]  Phone: [phone]  Fax: [phone]      Attending Physician [address]       Chief Complaint   Patient presents with    Emesis    Fatigue       DIAGNOSTIC RESULTS     LABS:  Labs Reviewed   URINE RT REFLEX TO CULTURE - Abnormal; Notable for the following components:       Result Value    Bilirubin Urine NEGATIVE  Verified by ictotest. (*)     Ketones, Urine SMALL (*)     Urine Hgb TRACE (*)     Protein, UA 2+ (*)     All other components within normal limits   MICROSCOPIC URINALYSIS - Abnormal; Notable for the following components:    Other Observations UA   (*)     Value: Utilizing a urinalysis as the only screening method to exclude a potential uropathogen can be unreliable in many patient populations. Rapid screening tests are less sensitive than culture and if UTI is a clinical possibility, culture should be considered despite a negative urinalysis. All other components within normal limits   RAPID INFLUENZA A/B ANTIGENS   COVID-19       All other labs were within normal range or not returned as of this dictation. RADIOLOGY:  XR CHEST PORTABLE   Final Result   Increased lung markings bilaterally, may be related to mild pulmonary edema   versus pneumonia. EMERGENCY DEPARTMENT COURSE:   Vitals:    Vitals:    12/09/21 1225   BP: 116/77   Pulse: 88   Resp: 12   Temp: 100 °F (37.8 °C)   TempSrc: Oral   SpO2: 93%   Weight: 69.9 kg (154 lb)   Height: 5' 6\" (1.676 m)     -------------------------  BP: 116/77, Temp: 100 °F (37.8 °C), Pulse: 88, Resp: 12             PERTINENT ATTENDING PHYSICIAN COMMENTS:    I performed a history and physical examination of the patient and discussed management with the mid level provider. I reviewed the mid level provider's note and agree with the documented findings and plan of care. Any areas of disagreement are noted on the chart.  I was personally present for the key portions of any procedures. I have documented in the chart those procedures where I was not present during the key portions. I have reviewed the emergency nurses triage note. I agree with the chief complaint, past medical history, past surgical history, allergies, medications, social and family history as documented unless otherwise noted below. Documentation of the HPI, Physical Exam and Medical Decision Making performed by mid level providers is based on my personal performance of the HPI, PE and MDM. For Physician Assistant/ Nurse Practitioner cases/documentation I have personally evaluated this patient and have completed at least one if not all key elements of the E/M (history, physical exam, and MDM). Additional findings are as noted.          (Please note that portions of this note were completed with a voice recognition program.  Efforts were made to edit the dictations but occasionally words are mis-transcribed.)    Ivonne Claude, DO  Attending Emergency Medicine Physician       Ivonne Claude, DO  12/09/21 1414

## 2021-12-10 ENCOUNTER — CARE COORDINATION (OUTPATIENT)
Dept: CARE COORDINATION | Age: 56
End: 2021-12-10

## 2021-12-10 LAB
SARS-COV-2: ABNORMAL
SARS-COV-2: DETECTED
SOURCE: ABNORMAL

## 2021-12-10 NOTE — CARE COORDINATION
ED follow up call attempted for Covid-19 screen. Patient was not available. Message left requesting return call. Call back info provided. Will attempt follow up with patient later today if no response received.

## 2021-12-10 NOTE — CARE COORDINATION
Second ED follow up call attempted for Covid-19 screen. Patient was not available. Message left requesting return call. Call back info provided. Will resolve episode if no response received.

## 2021-12-13 ENCOUNTER — TELEPHONE (OUTPATIENT)
Dept: FAMILY MEDICINE CLINIC | Age: 56
End: 2021-12-13

## 2022-01-04 ENCOUNTER — HOSPITAL ENCOUNTER (OUTPATIENT)
Age: 57
Discharge: HOME | End: 2022-01-06
Payer: MEDICARE

## 2022-01-04 ENCOUNTER — HOSPITAL ENCOUNTER (OUTPATIENT)
Age: 57
Discharge: HOME | End: 2022-01-04
Payer: MEDICARE

## 2022-01-04 ENCOUNTER — HOSPITAL ENCOUNTER (OUTPATIENT)
Dept: GENERAL RADIOLOGY | Age: 57
Discharge: HOME | End: 2022-01-06
Payer: MEDICARE

## 2022-01-04 DIAGNOSIS — J92.9 PLEURAL THICKENING: ICD-10-CM

## 2022-01-04 DIAGNOSIS — R91.8 ABNORMALITY OF LUNG ON CXR: Primary | ICD-10-CM

## 2022-01-04 DIAGNOSIS — D69.3 CHRONIC ITP (IDIOPATHIC THROMBOCYTOPENIA) (HCC): ICD-10-CM

## 2022-01-04 DIAGNOSIS — U07.1 COVID-19: ICD-10-CM

## 2022-01-04 LAB
ABSOLUTE EOS #: 0.41 K/UL (ref 0–0.4)
ABSOLUTE IMMATURE GRANULOCYTE: ABNORMAL K/UL (ref 0–0.3)
ABSOLUTE LYMPH #: 1.66 K/UL (ref 1–4.8)
ABSOLUTE MONO #: 0.46 K/UL (ref 0.1–1.2)
ALBUMIN SERPL-MCNC: 4.1 G/DL (ref 3.5–5.2)
ALBUMIN/GLOBULIN RATIO: 1.2 (ref 1–2.5)
ALP BLD-CCNC: 77 U/L (ref 35–104)
ALT SERPL-CCNC: 23 U/L (ref 5–33)
ANION GAP SERPL CALCULATED.3IONS-SCNC: 11 MMOL/L (ref 9–17)
AST SERPL-CCNC: 24 U/L
BASOPHILS # BLD: 1 % (ref 0–2)
BASOPHILS ABSOLUTE: 0.05 K/UL (ref 0–0.2)
BILIRUB SERPL-MCNC: 0.58 MG/DL (ref 0.3–1.2)
BUN BLDV-MCNC: 12 MG/DL (ref 6–20)
BUN/CREAT BLD: ABNORMAL (ref 9–20)
CALCIUM SERPL-MCNC: 9.2 MG/DL (ref 8.6–10.4)
CHLORIDE BLD-SCNC: 97 MMOL/L (ref 98–107)
CO2: 26 MMOL/L (ref 20–31)
CREAT SERPL-MCNC: 0.69 MG/DL (ref 0.5–0.9)
DIFFERENTIAL TYPE: ABNORMAL
EOSINOPHILS RELATIVE PERCENT: 9 % (ref 1–4)
GFR AFRICAN AMERICAN: >60 ML/MIN
GFR NON-AFRICAN AMERICAN: >60 ML/MIN
GFR SERPL CREATININE-BSD FRML MDRD: ABNORMAL ML/MIN/{1.73_M2}
GFR SERPL CREATININE-BSD FRML MDRD: ABNORMAL ML/MIN/{1.73_M2}
GLUCOSE BLD-MCNC: 100 MG/DL (ref 70–99)
HCT VFR BLD CALC: 42.3 % (ref 36–46)
HEMOGLOBIN: 14.5 G/DL (ref 12–16)
IMMATURE GRANULOCYTES: ABNORMAL %
LYMPHOCYTES # BLD: 36 % (ref 24–44)
MCH RBC QN AUTO: 31.2 PG (ref 26–34)
MCHC RBC AUTO-ENTMCNC: 34.3 G/DL (ref 31–37)
MCV RBC AUTO: 90.9 FL (ref 80–100)
MONOCYTES # BLD: 10 % (ref 2–11)
MORPHOLOGY: NORMAL
NRBC AUTOMATED: ABNORMAL PER 100 WBC
PDW BLD-RTO: 16.1 % (ref 12.5–15.4)
PLATELET # BLD: 37 K/UL (ref 140–450)
PLATELET ESTIMATE: ABNORMAL
PMV BLD AUTO: 9 FL (ref 6–12)
POTASSIUM SERPL-SCNC: 3.4 MMOL/L (ref 3.7–5.3)
RBC # BLD: 4.65 M/UL (ref 4–5.2)
RBC # BLD: ABNORMAL 10*6/UL
SEG NEUTROPHILS: 44 % (ref 36–66)
SEGMENTED NEUTROPHILS ABSOLUTE COUNT: 2.02 K/UL (ref 1.8–7.7)
SODIUM BLD-SCNC: 134 MMOL/L (ref 135–144)
TOTAL PROTEIN: 7.4 G/DL (ref 6.4–8.3)
WBC # BLD: 4.6 K/UL (ref 3.5–11)
WBC # BLD: ABNORMAL 10*3/UL

## 2022-01-04 PROCEDURE — 36415 COLL VENOUS BLD VENIPUNCTURE: CPT

## 2022-01-04 PROCEDURE — 85025 COMPLETE CBC W/AUTO DIFF WBC: CPT

## 2022-01-04 PROCEDURE — 80053 COMPREHEN METABOLIC PANEL: CPT

## 2022-01-04 PROCEDURE — 71046 X-RAY EXAM CHEST 2 VIEWS: CPT

## 2022-01-05 DIAGNOSIS — E03.8 HYPOTHYROIDISM DUE TO HASHIMOTO'S THYROIDITIS: ICD-10-CM

## 2022-01-05 DIAGNOSIS — E06.3 HYPOTHYROIDISM DUE TO HASHIMOTO'S THYROIDITIS: ICD-10-CM

## 2022-01-05 RX ORDER — LEVOTHYROXINE SODIUM 0.07 MG/1
TABLET ORAL
Qty: 90 TABLET | Refills: 1 | Status: SHIPPED | OUTPATIENT
Start: 2022-01-05 | End: 2022-07-19

## 2022-01-05 NOTE — TELEPHONE ENCOUNTER
LOV 5/11/21  RTO Not listed  LRF 7/12/21    Health Maintenance   Topic Date Due    COVID-19 Vaccine (1) Never done    Cervical cancer screen  Never done    Breast cancer screen  Never done    Colon Cancer Screen FIT/FOBT  07/30/2019    Shingles Vaccine (1 of 2) 05/11/2022 (Originally 8/12/2015)    Depression Screen  05/11/2022    TSH testing  07/29/2022    Potassium monitoring  01/04/2023    Creatinine monitoring  01/04/2023    DTaP/Tdap/Td vaccine (2 - Td or Tdap) 10/31/2023    Lipid screen  02/28/2025    Flu vaccine  Completed    Hepatitis C screen  Completed    HIV screen  Completed    Hepatitis A vaccine  Aged Out    Hepatitis B vaccine  Aged Out    Hib vaccine  Aged Out    Meningococcal (ACWY) vaccine  Aged Out    Pneumococcal 0-64 years Vaccine  Aged Out             (applicable per patient's age: Cancer Screenings, Depression Screening, Fall Risk Screening, Immunizations)    LDL Cholesterol (mg/dL)   Date Value   02/29/2020 120     AST (U/L)   Date Value   01/04/2022 24     ALT (U/L)   Date Value   01/04/2022 23     BUN (mg/dL)   Date Value   01/04/2022 12      (goal A1C is < 7)   (goal LDL is <100) need 30-50% reduction from baseline     BP Readings from Last 3 Encounters:   12/09/21 114/64   11/02/21 (!) 159/64   08/03/21 (!) 166/88    (goal /80)      All Future Testing planned in [facility]:  Lab Frequency Next Occurrence   Comprehensive Metabolic Panel, Fasting Once 05/11/2022   HENRIQUE DIGITAL SCREEN W OR WO CAD BILATERAL Once 07/11/2022   CT CHEST WO CONTRAST Once 01/04/2022   CBC With Auto Differential     Comprehensive Metabolic Panel         Next Visit Date:  Future Appointments   Date Time Provider Department Center   2/1/2022 12:30 PM Amaury Anthony MD [address] [place] [address]            Patient Active Problem List:     Chronic non-seasonal allergic rhinitis     Wears dentures     Family history of breast cancer in sister     Abnormal bruising     Acute ITP ([facility] [address]     Thyroid enlargement     History of ITP     Mixed hyperlipidemia     Essential hypertension     Personal history of diseases of the blood and blood-forming organs and certain disorders involving the immune mechanism     Hypothyroidism due to Hashimoto's thyroiditis     Fatigue

## 2022-01-18 ENCOUNTER — HOSPITAL ENCOUNTER (OUTPATIENT)
Dept: CT IMAGING | Age: 57
Discharge: HOME | End: 2022-01-20
Payer: MEDICARE

## 2022-01-18 DIAGNOSIS — R91.8 ABNORMALITY OF LUNG ON CXR: ICD-10-CM

## 2022-01-18 DIAGNOSIS — J92.9 PLEURAL THICKENING: ICD-10-CM

## 2022-01-18 PROCEDURE — 71250 CT THORAX DX C-: CPT

## 2022-01-19 DIAGNOSIS — N63.20 LEFT BREAST MASS: Primary | ICD-10-CM

## 2022-01-20 NOTE — RESULT ENCOUNTER NOTE
Called patient directly   Reviewed CT findings. Recommended repeat CT 3-4 months per radiology. Partially visualized 2 cm left breast soft tissue mass which requires further workup. Diagnostic imaging ordered STAT  Also concern with lymph node swelling.  Will forward to Romario Aldrich to review

## 2022-02-01 ENCOUNTER — OFFICE VISIT (OUTPATIENT)
Dept: ONCOLOGY | Age: 57
End: 2022-02-01
Payer: MEDICARE

## 2022-02-01 ENCOUNTER — TELEPHONE (OUTPATIENT)
Dept: ONCOLOGY | Age: 57
End: 2022-02-01

## 2022-02-01 ENCOUNTER — HOSPITAL ENCOUNTER (OUTPATIENT)
Age: 57
Discharge: HOME | End: 2022-02-01
Payer: MEDICARE

## 2022-02-01 VITALS
RESPIRATION RATE: 16 BRPM | HEART RATE: 126 BPM | BODY MASS INDEX: 25.42 KG/M2 | WEIGHT: 157.5 LBS | DIASTOLIC BLOOD PRESSURE: 91 MMHG | TEMPERATURE: 98.2 F | SYSTOLIC BLOOD PRESSURE: 138 MMHG

## 2022-02-01 DIAGNOSIS — Z86.2 HISTORY OF ITP: ICD-10-CM

## 2022-02-01 DIAGNOSIS — D69.3 ACUTE ITP (HCC): ICD-10-CM

## 2022-02-01 DIAGNOSIS — N63.0 BREAST MASS: Primary | ICD-10-CM

## 2022-02-01 DIAGNOSIS — D69.3 CHRONIC ITP (IDIOPATHIC THROMBOCYTOPENIA) (HCC): ICD-10-CM

## 2022-02-01 LAB
ABSOLUTE EOS #: 0.41 K/UL (ref 0–0.4)
ABSOLUTE IMMATURE GRANULOCYTE: ABNORMAL K/UL (ref 0–0.3)
ABSOLUTE LYMPH #: 2.38 K/UL (ref 1–4.8)
ABSOLUTE MONO #: 0.29 K/UL (ref 0.1–0.8)
ALBUMIN SERPL-MCNC: 4.6 G/DL (ref 3.5–5.2)
ALBUMIN/GLOBULIN RATIO: 1.5 (ref 1–2.5)
ALP BLD-CCNC: 82 U/L (ref 35–104)
ALT SERPL-CCNC: 13 U/L (ref 5–33)
ANION GAP SERPL CALCULATED.3IONS-SCNC: 12 MMOL/L (ref 9–17)
AST SERPL-CCNC: 19 U/L
BASOPHILS # BLD: 1 % (ref 0–2)
BASOPHILS ABSOLUTE: 0.06 K/UL (ref 0–0.2)
BILIRUB SERPL-MCNC: 0.62 MG/DL (ref 0.3–1.2)
BUN BLDV-MCNC: 12 MG/DL (ref 6–20)
BUN/CREAT BLD: ABNORMAL (ref 9–20)
CALCIUM SERPL-MCNC: 9.9 MG/DL (ref 8.6–10.4)
CHLORIDE BLD-SCNC: 98 MMOL/L (ref 98–107)
CO2: 26 MMOL/L (ref 20–31)
CREAT SERPL-MCNC: 0.92 MG/DL (ref 0.5–0.9)
DIFFERENTIAL TYPE: ABNORMAL
EOSINOPHILS RELATIVE PERCENT: 7 % (ref 1–4)
GFR AFRICAN AMERICAN: >60 ML/MIN
GFR NON-AFRICAN AMERICAN: >60 ML/MIN
GFR SERPL CREATININE-BSD FRML MDRD: ABNORMAL ML/MIN/{1.73_M2}
GFR SERPL CREATININE-BSD FRML MDRD: ABNORMAL ML/MIN/{1.73_M2}
GLUCOSE BLD-MCNC: 104 MG/DL (ref 70–99)
HCT VFR BLD CALC: 44.4 % (ref 36–46)
HEMOGLOBIN: 15.1 G/DL (ref 12–16)
IMMATURE GRANULOCYTES: ABNORMAL %
LYMPHOCYTES # BLD: 41 % (ref 24–44)
MCH RBC QN AUTO: 31.7 PG (ref 26–34)
MCHC RBC AUTO-ENTMCNC: 34 G/DL (ref 31–37)
MCV RBC AUTO: 93.3 FL (ref 80–100)
MONOCYTES # BLD: 5 % (ref 1–7)
MORPHOLOGY: NORMAL
NRBC AUTOMATED: ABNORMAL PER 100 WBC
PDW BLD-RTO: 17.1 % (ref 12.5–15.4)
PLATELET # BLD: 28 K/UL (ref 140–450)
PLATELET ESTIMATE: ABNORMAL
PMV BLD AUTO: 10.3 FL (ref 6–12)
POTASSIUM SERPL-SCNC: 3.4 MMOL/L (ref 3.7–5.3)
RBC # BLD: 4.76 M/UL (ref 4–5.2)
RBC # BLD: ABNORMAL 10*6/UL
SEG NEUTROPHILS: 46 % (ref 36–66)
SEGMENTED NEUTROPHILS ABSOLUTE COUNT: 2.66 K/UL (ref 1.8–7.7)
SODIUM BLD-SCNC: 136 MMOL/L (ref 135–144)
TOTAL PROTEIN: 7.7 G/DL (ref 6.4–8.3)
WBC # BLD: 5.8 K/UL (ref 3.5–11)
WBC # BLD: ABNORMAL 10*3/UL

## 2022-02-01 PROCEDURE — 99214 OFFICE O/P EST MOD 30 MIN: CPT | Performed by: INTERNAL MEDICINE

## 2022-02-01 PROCEDURE — G8482 FLU IMMUNIZE ORDER/ADMIN: HCPCS | Performed by: INTERNAL MEDICINE

## 2022-02-01 PROCEDURE — G8427 DOCREV CUR MEDS BY ELIG CLIN: HCPCS | Performed by: INTERNAL MEDICINE

## 2022-02-01 PROCEDURE — 36415 COLL VENOUS BLD VENIPUNCTURE: CPT

## 2022-02-01 PROCEDURE — 85025 COMPLETE CBC W/AUTO DIFF WBC: CPT

## 2022-02-01 PROCEDURE — 99211 OFF/OP EST MAY X REQ PHY/QHP: CPT | Performed by: INTERNAL MEDICINE

## 2022-02-01 PROCEDURE — 3017F COLORECTAL CA SCREEN DOC REV: CPT | Performed by: INTERNAL MEDICINE

## 2022-02-01 PROCEDURE — G8419 CALC BMI OUT NRM PARAM NOF/U: HCPCS | Performed by: INTERNAL MEDICINE

## 2022-02-01 PROCEDURE — 80053 COMPREHEN METABOLIC PANEL: CPT

## 2022-02-01 PROCEDURE — 1036F TOBACCO NON-USER: CPT | Performed by: INTERNAL MEDICINE

## 2022-02-01 RX ORDER — MEPERIDINE HYDROCHLORIDE 50 MG/ML
12.5 INJECTION INTRAMUSCULAR; INTRAVENOUS; SUBCUTANEOUS PRN
Status: CANCELLED | OUTPATIENT
Start: 2022-02-08

## 2022-02-01 RX ORDER — DIPHENHYDRAMINE HYDROCHLORIDE 50 MG/ML
25 INJECTION INTRAMUSCULAR; INTRAVENOUS ONCE
Status: CANCELLED | OUTPATIENT
Start: 2022-02-08 | End: 2022-02-08

## 2022-02-01 RX ORDER — ALBUTEROL SULFATE 90 UG/1
4 AEROSOL, METERED RESPIRATORY (INHALATION) PRN
Status: CANCELLED | OUTPATIENT
Start: 2022-02-08

## 2022-02-01 RX ORDER — DIPHENHYDRAMINE HYDROCHLORIDE 50 MG/ML
50 INJECTION INTRAMUSCULAR; INTRAVENOUS
Status: CANCELLED | OUTPATIENT
Start: 2022-02-08

## 2022-02-01 RX ORDER — ONDANSETRON 2 MG/ML
8 INJECTION INTRAMUSCULAR; INTRAVENOUS
Status: CANCELLED | OUTPATIENT
Start: 2022-02-08

## 2022-02-01 RX ORDER — SODIUM CHLORIDE 9 MG/ML
INJECTION, SOLUTION INTRAVENOUS CONTINUOUS
Status: CANCELLED | OUTPATIENT
Start: 2022-02-08

## 2022-02-01 RX ORDER — SODIUM CHLORIDE 9 MG/ML
25 INJECTION, SOLUTION INTRAVENOUS PRN
Status: CANCELLED | OUTPATIENT
Start: 2022-02-08

## 2022-02-01 RX ORDER — OMEPRAZOLE 20 MG/1
20 CAPSULE, DELAYED RELEASE ORAL DAILY
Qty: 30 CAPSULE | Refills: 3 | Status: SHIPPED | OUTPATIENT
Start: 2022-02-01 | End: 2022-06-03 | Stop reason: SDUPTHER

## 2022-02-01 RX ORDER — SODIUM CHLORIDE 0.9 % (FLUSH) 0.9 %
5-40 SYRINGE (ML) INJECTION PRN
Status: CANCELLED | OUTPATIENT
Start: 2022-02-08

## 2022-02-01 RX ORDER — ACETAMINOPHEN 325 MG/1
650 TABLET ORAL
Status: CANCELLED | OUTPATIENT
Start: 2022-02-08

## 2022-02-01 RX ORDER — ACETAMINOPHEN 325 MG/1
650 TABLET ORAL ONCE
Status: CANCELLED | OUTPATIENT
Start: 2022-02-08

## 2022-02-01 RX ORDER — EPINEPHRINE 1 MG/ML
0.3 INJECTION, SOLUTION, CONCENTRATE INTRAVENOUS PRN
Status: CANCELLED | OUTPATIENT
Start: 2022-02-08

## 2022-02-01 RX ORDER — HEPARIN SODIUM (PORCINE) LOCK FLUSH IV SOLN 100 UNIT/ML 100 UNIT/ML
500 SOLUTION INTRAVENOUS PRN
Status: CANCELLED | OUTPATIENT
Start: 2022-02-08

## 2022-02-01 NOTE — PROGRESS NOTES
DIAGNOSIS:   1. Severe thrombocytopenia, likely ITP with petechia and bruising, no active bleeding, steroid dependent  2. Hashimoto thyroiditis     CURRENT THERAPY:  1. Start steroids course (high dose dex)  07/31/2018 - good initial response  2. Transition to Rituxan, with excellent response, completed September 2018   3. Relapsed ITP in January 2022, plan retreatment with rituximab. BRIEF CASE HISTORY:  Maddie Solis is a very pleasant 64 y.o. female who is referred to us for thrombocytopenia. She recently had CBC via PCP which showed PLT count at 5. She has no other history of lab work. She notes she has petechia ecchymosis which started about a year ago and has been bruising easily over the last weeks. She was on steroids for shoulder dislocation a few moths ago and had GI distress. She reports the bruising started after that. She had hypothyroidism and takes Synthroid. She has not started new medication recently and does not take any blood thinners or pain medication. She has no history of stroke, heart attack, previous blood disorders, diabetes, or cancer. She does not drink alcohol or smoke cigarettes. Started on steroids 07/31/2018 - good initial response. She was found to be steroid dependent, plan for Rituxan. Excellent response. She had persistent bone pain and bone marrow biopsy was done and was negative. Her nephew was diagnosed with ITP 11/2019 at age 55.    she was later diagnosed with hashimoto thyroiditis. The patient was monitored and off treatment for about 3 years but in 2022, her platelets were down to 28 and she required another treatment. INTERIM HISTORY: The patient presents for follow up for ITP and to review lab work. She has mammogram scheduled for Friday as follow up to 2 cm mass on left breast seen on CT. She is taking Prilosec daily for GI irritation.      PAST MEDICAL HISTORY: has a past medical history of Acute ITP ([address] Atrial fibrillation ([address] Hyperthyroidism, and Wears dentures. PAST SURGICAL HISTORY: has a past surgical history that includes  section (). CURRENT MEDICATIONS:  has a current medication list which includes the following prescription(s): levothyroxine, ondansetron, diclofenac sodium, hydrochlorothiazide, omeprazole, wheat dextrin-calcium, vitamin d3, biotin, potassium, and homeopathic products. ALLERGIES:  is allergic to bee venom and nuts [peanut-containing drug products]. FAMILY HISTORY: Mother and nephew: colon cancer; Aunt: bladder cancer; mother, aunt, 2 nieces with Hashimoto's; nephew ITP     SOCIAL HISTORY:  reports that she has never smoked. She has never used smokeless tobacco. She reports current alcohol use. She reports that she does not use drugs. REVIEW OF SYSTEMS:   General: No fever or night sweats. Weight is stable. Worsening fatigue  ENT: No double or blurred vision, no tinnitus or hearing problem, no dysphagia or sore throat   Respiratory: No chest pain, no shortness of breath, no cough or hemoptysis. Cardiovascular: Denies chest pain, PND or orthopnea. No L E swelling or palpitations. Gastrointestinal:  No nausea or vomiting, diarrhea or constipation. +intermittent abdominal pain  Genitourinary: Denies dysuria, hematuria, frequency, urgency or incontinence. Neurological: Denies headaches, decreased LOC, no sensory or motor focal deficits. Musculoskeletal:  No arthralgia no back pain or joint swelling. Skin: There are no rashes or bleeding. Psychiatric:  No anxiety, no depression. Endocrine: No diabetes or thyroid disease. Hematologic: No bleeding, no adenopathy. PHYSICAL EXAM: Shows a well appearing 64y.o.-year-old female who is not in pain or distress. Vital Signs: Blood pressure (!) 138/91, pulse 126, temperature 98.2 °F (36.8 °C), temperature source Oral, resp. rate 16, weight 157 lb 8 oz (71.4 kg), not currently breastfeeding. HEENT: Normocephalic and atraumatic.  Pupils are equal, round, reactive to light and accommodation. Extraocular muscles are intact. Neck: Showed no JVD, no carotid bruit . Lungs: Clear to auscultation bilaterally. Heart: Regular without any murmur. Abdomen: Soft, mild tenderness LUQ. No hepatosplenomegaly. Extremities: No point tenderness in knee or hip. Lower extremities show no edema, clubbing, or cyanosis. Breasts: Left: no mass appreciated Right: no masses, no lesions Neuro exam: intact cranial nerves bilaterally no motor or sensory deficit, gait is normal. Lymphatic: no adenopathy appreciated in the supraclavicular, axillary, cervical or inguinal area Skin: petechia and bruising; bone marrow site is well healed    REVIEW OF LABORATORY DATA:   Lab Results   Component Value Date    WBC 5.8 02/01/2022    HGB 15.1 02/01/2022    HCT 44.4 02/01/2022    MCV 93.3 02/01/2022    PLT 28 (L) 02/01/2022           Chemistry        Component Value Date/Time     02/01/2022 1219    K 3.4 (L) 02/01/2022 1219    CL 98 02/01/2022 1219    CO2 26 02/01/2022 1219    BUN 12 02/01/2022 1219    CREATININE 0.92 (H) 02/01/2022 1219        Component Value Date/Time    CALCIUM 9.9 02/01/2022 1219    ALKPHOS 82 02/01/2022 1219    AST 19 02/01/2022 1219    ALT 13 02/01/2022 1219    BILITOT 0.62 02/01/2022 1219          PATHOLOGY:     REVIEW OF RADIOLOGICAL RESULTS:     IMPRESSION:   1. ITP, steroid refractory  2. Started 4 day course of steroids - good initial response. 3. Steroid dependent, plan to transition to Rituxan - excellent response  4. Persistent bone pain, bone marrow was done, negative, ITP active but controlled  5. Hashimoto thyroiditis, on synthroid   6. Left breast mass seen on CT scan, work-up in progress    PLAN:   1. We discussed her current lab work showing continued thrombocytopenia. 2. She is ready to start therapy and I am putting in orders.   3. We reviewed recent CT showing post infiltrate COVID changes and 2 cm mass on left breast, she has follow up mammogram on Friday. 4. Breast exam did not reveal any palpable mass. 5. I am refilling her Prilosec, I recommend she reduce to every other day and use Tums on the other days. 6. Return in 4 weeks, sooner if mammogram reveals anything of concern. Scribe Attestation   This note was created by Patel Leong acting as scribe for the physician signing this note  Electronically Signed  Sue Manning, 2/1/2022  Scribe, [employer]. Attending Attestation   Note was reviewed and edited.   I am in agreement with the note as entered    Karen Anthony MD  Hematologist/Medical [id number] [facility] hematology oncology physicians

## 2022-02-01 NOTE — PATIENT INSTRUCTIONS
See orders for rituximab  Start once approved, pt prefers tuesdays as treatment days  rv to see me in 4 weeks.

## 2022-02-01 NOTE — TELEPHONE ENCOUNTER
See orders for rituximab  Start once approved, pt prefers tuesdays as treatment days  rv to see me in 4 weeks      AVS given to  to follow precert    RV scheduled 3/1/22 @ 9:45am    PT was given AVS and an appt schedule    Electronically signed by Cornelius Davila on 2/1/2022 at 3:41 PM

## 2022-02-03 DIAGNOSIS — I10 ESSENTIAL HYPERTENSION: ICD-10-CM

## 2022-02-03 RX ORDER — HYDROCHLOROTHIAZIDE 25 MG/1
25 TABLET ORAL DAILY
Qty: 90 TABLET | Refills: 1 | Status: SHIPPED | OUTPATIENT
Start: 2022-02-03 | End: 2022-03-29 | Stop reason: SDUPTHER

## 2022-02-03 NOTE — TELEPHONE ENCOUNTER
Last visit: 5-11-21  Last Med refill: 10-4-21  Does patient have enough medication for 72 hours: Yes    Next Visit Date:  Future Appointments   Date Time Provider Leon Light   2/4/2022  8:30 AM STA MAMMO RM 1 STAZ MAMMO STA Radiolog   2/4/2022  9:00 AM STA ULTRASOUND RM 3 STAZ US STA Radiolog   3/1/2022  9:45 AM MD Yang Casas Maintenance   Topic Date Due    COVID-19 Vaccine (1) Never done    Cervical cancer screen  Never done    Breast cancer screen  Never done    Colon Cancer Screen FIT/FOBT  07/30/2019    Shingles Vaccine (1 of 2) 05/11/2022 (Originally 8/12/2015)    Depression Screen  05/11/2022    TSH testing  07/29/2022    Potassium monitoring  02/01/2023    Creatinine monitoring  02/01/2023    Diabetes screen  02/28/2023    DTaP/Tdap/Td vaccine (2 - Td or Tdap) 10/31/2023    Lipid screen  02/28/2025    Flu vaccine  Completed    Hepatitis C screen  Completed    HIV screen  Completed    Hepatitis A vaccine  Aged Out    Hepatitis B vaccine  Aged Out    Hib vaccine  Aged Out    Meningococcal (ACWY) vaccine  Aged Out    Pneumococcal 0-64 years Vaccine  Aged Out       No results found for: LABA1C          ( goal A1C is < 7)   No results found for: LABMICR  LDL Cholesterol (mg/dL)   Date Value   02/29/2020 120   07/30/2018 142 (H)       (goal LDL is <100)   AST (U/L)   Date Value   02/01/2022 19     ALT (U/L)   Date Value   02/01/2022 13     BUN (mg/dL)   Date Value   02/01/2022 12     BP Readings from Last 3 Encounters:   02/01/22 (!) 138/91   12/09/21 114/64   11/02/21 (!) 159/64          (goal 120/80)    All Future Testing planned in [facility]  Lab Frequency Next Occurrence   Comprehensive Metabolic Panel, Fasting Once 05/11/2022   HENRIQUE DIGITAL SCREEN W OR WO CAD BILATERAL Once 07/11/2022   HENRIQUE DIGITAL DIAGNOSTIC W OR WO CAD BILATERAL Once 02/17/2022   US BREAST COMPLETE LEFT Once 02/17/2022   CBC With Auto Differential     Comprehensive Metabolic Panel                 Patient Active Problem List:     Chronic non-seasonal allergic rhinitis     Wears dentures     Family history of breast cancer in sister     Abnormal bruising     Acute ITP (HCC)     Thyroid enlargement     History of ITP     Mixed hyperlipidemia     Essential hypertension     Personal history of diseases of the blood and blood-forming organs and certain disorders involving the immune mechanism     Hypothyroidism due to Hashimoto's thyroiditis     Fatigue

## 2022-02-04 ENCOUNTER — HOSPITAL ENCOUNTER (OUTPATIENT)
Dept: MAMMOGRAPHY | Age: 57
Discharge: HOME | End: 2022-02-06
Payer: MEDICARE

## 2022-02-04 ENCOUNTER — HOSPITAL ENCOUNTER (OUTPATIENT)
Dept: ULTRASOUND IMAGING | Age: 57
Discharge: HOME | End: 2022-02-06
Payer: MEDICARE

## 2022-02-04 DIAGNOSIS — N63.20 LEFT BREAST MASS: ICD-10-CM

## 2022-02-04 DIAGNOSIS — N63.20 LEFT BREAST MASS: Primary | ICD-10-CM

## 2022-02-04 PROCEDURE — G0279 TOMOSYNTHESIS, MAMMO: HCPCS

## 2022-02-04 PROCEDURE — 76642 ULTRASOUND BREAST LIMITED: CPT

## 2022-02-04 NOTE — PROGRESS NOTES
Ultrasound breast biopsy ordered per recent mammogram /ultrasound findings.    Patient aware of findings, biopsy already scheduled

## 2022-02-04 NOTE — RESULT ENCOUNTER NOTE
Biopsy ordered, already scheduled.   They discussed findings with her today in detail with outlined plan

## 2022-02-10 ENCOUNTER — TELEPHONE (OUTPATIENT)
Dept: ONCOLOGY | Age: 57
End: 2022-02-10

## 2022-02-22 ENCOUNTER — HOSPITAL ENCOUNTER (OUTPATIENT)
Dept: INFUSION THERAPY | Age: 57
Discharge: HOME | End: 2022-02-22
Payer: MEDICARE

## 2022-02-22 VITALS
BODY MASS INDEX: 25.52 KG/M2 | HEART RATE: 63 BPM | RESPIRATION RATE: 16 BRPM | DIASTOLIC BLOOD PRESSURE: 81 MMHG | HEIGHT: 66 IN | OXYGEN SATURATION: 99 % | TEMPERATURE: 97.3 F | WEIGHT: 158.8 LBS | SYSTOLIC BLOOD PRESSURE: 149 MMHG

## 2022-02-22 DIAGNOSIS — D69.3 CHRONIC ITP (IDIOPATHIC THROMBOCYTOPENIA) (HCC): Primary | ICD-10-CM

## 2022-02-22 DIAGNOSIS — D69.3 ACUTE ITP (HCC): Primary | ICD-10-CM

## 2022-02-22 PROCEDURE — 96413 CHEMO IV INFUSION 1 HR: CPT

## 2022-02-22 PROCEDURE — 6370000000 HC RX 637 (ALT 250 FOR IP): Performed by: INTERNAL MEDICINE

## 2022-02-22 PROCEDURE — 6360000002 HC RX W HCPCS: Performed by: INTERNAL MEDICINE

## 2022-02-22 PROCEDURE — 96375 TX/PRO/DX INJ NEW DRUG ADDON: CPT

## 2022-02-22 PROCEDURE — 2580000003 HC RX 258: Performed by: INTERNAL MEDICINE

## 2022-02-22 PROCEDURE — 96376 TX/PRO/DX INJ SAME DRUG ADON: CPT

## 2022-02-22 PROCEDURE — 96415 CHEMO IV INFUSION ADDL HR: CPT

## 2022-02-22 RX ORDER — SODIUM CHLORIDE 0.9 % (FLUSH) 0.9 %
5-40 SYRINGE (ML) INJECTION PRN
Status: CANCELLED | OUTPATIENT
Start: 2022-03-01

## 2022-02-22 RX ORDER — DEXAMETHASONE SODIUM PHOSPHATE 10 MG/ML
10 INJECTION INTRAMUSCULAR; INTRAVENOUS ONCE
Status: COMPLETED | OUTPATIENT
Start: 2022-02-22 | End: 2022-02-22

## 2022-02-22 RX ORDER — SODIUM CHLORIDE 9 MG/ML
INJECTION, SOLUTION INTRAVENOUS CONTINUOUS
Status: ACTIVE | OUTPATIENT
Start: 2022-02-22 | End: 2022-02-22

## 2022-02-22 RX ORDER — DIPHENHYDRAMINE HYDROCHLORIDE 50 MG/ML
50 INJECTION INTRAMUSCULAR; INTRAVENOUS
Status: COMPLETED | OUTPATIENT
Start: 2022-02-22 | End: 2022-02-22

## 2022-02-22 RX ORDER — ONDANSETRON 2 MG/ML
8 INJECTION INTRAMUSCULAR; INTRAVENOUS
Status: CANCELLED | OUTPATIENT
Start: 2022-03-01

## 2022-02-22 RX ORDER — DIPHENHYDRAMINE HYDROCHLORIDE 50 MG/ML
25 INJECTION INTRAMUSCULAR; INTRAVENOUS ONCE
Status: COMPLETED | OUTPATIENT
Start: 2022-02-22 | End: 2022-02-22

## 2022-02-22 RX ORDER — ALBUTEROL SULFATE 90 UG/1
4 AEROSOL, METERED RESPIRATORY (INHALATION) PRN
Status: CANCELLED | OUTPATIENT
Start: 2022-03-01

## 2022-02-22 RX ORDER — SODIUM CHLORIDE 9 MG/ML
INJECTION, SOLUTION INTRAVENOUS CONTINUOUS
Status: CANCELLED | OUTPATIENT
Start: 2022-03-01

## 2022-02-22 RX ORDER — ACETAMINOPHEN 325 MG/1
650 TABLET ORAL ONCE
Status: CANCELLED | OUTPATIENT
Start: 2022-03-01

## 2022-02-22 RX ORDER — DIPHENHYDRAMINE HYDROCHLORIDE 50 MG/ML
50 INJECTION INTRAMUSCULAR; INTRAVENOUS ONCE
Status: CANCELLED | OUTPATIENT
Start: 2022-03-01 | End: 2022-03-01

## 2022-02-22 RX ORDER — EPINEPHRINE 1 MG/ML
0.3 INJECTION, SOLUTION, CONCENTRATE INTRAVENOUS PRN
Status: CANCELLED | OUTPATIENT
Start: 2022-03-01

## 2022-02-22 RX ORDER — ACETAMINOPHEN 325 MG/1
650 TABLET ORAL
Status: CANCELLED | OUTPATIENT
Start: 2022-03-01

## 2022-02-22 RX ORDER — SODIUM CHLORIDE 9 MG/ML
25 INJECTION, SOLUTION INTRAVENOUS PRN
Status: CANCELLED | OUTPATIENT
Start: 2022-03-01

## 2022-02-22 RX ORDER — DIPHENHYDRAMINE HYDROCHLORIDE 50 MG/ML
25 INJECTION INTRAMUSCULAR; INTRAVENOUS ONCE
Status: CANCELLED | OUTPATIENT
Start: 2022-03-01 | End: 2022-03-01

## 2022-02-22 RX ORDER — ACETAMINOPHEN 325 MG/1
650 TABLET ORAL ONCE
Status: COMPLETED | OUTPATIENT
Start: 2022-02-22 | End: 2022-02-22

## 2022-02-22 RX ORDER — DIPHENHYDRAMINE HYDROCHLORIDE 50 MG/ML
50 INJECTION INTRAMUSCULAR; INTRAVENOUS
Status: CANCELLED | OUTPATIENT
Start: 2022-03-01

## 2022-02-22 RX ORDER — HEPARIN SODIUM (PORCINE) LOCK FLUSH IV SOLN 100 UNIT/ML 100 UNIT/ML
500 SOLUTION INTRAVENOUS PRN
Status: CANCELLED | OUTPATIENT
Start: 2022-03-01

## 2022-02-22 RX ORDER — MEPERIDINE HYDROCHLORIDE 50 MG/ML
12.5 INJECTION INTRAMUSCULAR; INTRAVENOUS; SUBCUTANEOUS PRN
Status: CANCELLED | OUTPATIENT
Start: 2022-03-01

## 2022-02-22 RX ADMIN — DIPHENHYDRAMINE HYDROCHLORIDE 25 MG: 50 INJECTION, SOLUTION INTRAMUSCULAR; INTRAVENOUS at 08:41

## 2022-02-22 RX ADMIN — DIPHENHYDRAMINE HYDROCHLORIDE 25 MG: 50 INJECTION, SOLUTION INTRAMUSCULAR; INTRAVENOUS at 10:15

## 2022-02-22 RX ADMIN — DEXAMETHASONE SODIUM PHOSPHATE 10 MG: 10 INJECTION INTRAMUSCULAR; INTRAVENOUS at 10:15

## 2022-02-22 RX ADMIN — ACETAMINOPHEN 650 MG: 325 TABLET ORAL at 08:41

## 2022-02-22 RX ADMIN — SODIUM CHLORIDE 700 MG: 9 INJECTION, SOLUTION INTRAVENOUS at 09:12

## 2022-02-22 RX ADMIN — SODIUM CHLORIDE: 9 INJECTION, SOLUTION INTRAVENOUS at 08:36

## 2022-02-22 RX ADMIN — DEXAMETHASONE SODIUM PHOSPHATE 10 MG: 10 INJECTION INTRAMUSCULAR; INTRAVENOUS at 08:46

## 2022-02-22 ASSESSMENT — PAIN SCALES - GENERAL: PAINLEVEL_OUTOF10: 0

## 2022-02-22 NOTE — ONCOLOGY
Patient arrived for day 1 ruxience. Patient stable and ambulatory on arrival. Patient complained of a itchy throat and cough one hour into the infusion at 1013. Infusion stopped, 25 mg benadryl and 10 mg of decadron given. Patient stated symptoms have resolved at 1025 and infusion restarted at second tritration, 86ml/hr. Infusion titrated to 129ml/hr, patient tolerated infusion at this rate. Infusion continued at this rate for remainder of treatment. Patient stable and ambulatory at discharge. Scheduled to return on 3/1/22 for ruxience, will need CBC. Premeds added to future treatments as directed.

## 2022-03-01 ENCOUNTER — OFFICE VISIT (OUTPATIENT)
Dept: ONCOLOGY | Age: 57
End: 2022-03-01
Payer: MEDICARE

## 2022-03-01 ENCOUNTER — HOSPITAL ENCOUNTER (OUTPATIENT)
Dept: INFUSION THERAPY | Age: 57
Discharge: HOME | End: 2022-03-01
Payer: MEDICARE

## 2022-03-01 ENCOUNTER — TELEPHONE (OUTPATIENT)
Dept: ONCOLOGY | Age: 57
End: 2022-03-01

## 2022-03-01 VITALS
BODY MASS INDEX: 25.99 KG/M2 | TEMPERATURE: 97.9 F | SYSTOLIC BLOOD PRESSURE: 178 MMHG | DIASTOLIC BLOOD PRESSURE: 97 MMHG | WEIGHT: 161 LBS | RESPIRATION RATE: 16 BRPM | HEART RATE: 68 BPM

## 2022-03-01 VITALS
BODY MASS INDEX: 26.02 KG/M2 | SYSTOLIC BLOOD PRESSURE: 178 MMHG | WEIGHT: 161.2 LBS | RESPIRATION RATE: 16 BRPM | DIASTOLIC BLOOD PRESSURE: 97 MMHG | HEART RATE: 68 BPM | TEMPERATURE: 97.9 F

## 2022-03-01 DIAGNOSIS — Z86.2 HISTORY OF ITP: Primary | ICD-10-CM

## 2022-03-01 DIAGNOSIS — D69.3 CHRONIC ITP (IDIOPATHIC THROMBOCYTOPENIA) (HCC): Primary | ICD-10-CM

## 2022-03-01 DIAGNOSIS — D69.3 ACUTE ITP (HCC): ICD-10-CM

## 2022-03-01 LAB
ABSOLUTE EOS #: 0.3 K/UL (ref 0–0.4)
ABSOLUTE LYMPH #: 1.2 K/UL (ref 1–4.8)
ABSOLUTE MONO #: 0.6 K/UL (ref 0.1–1.2)
ALBUMIN SERPL-MCNC: 4.6 G/DL (ref 3.5–5.2)
ALBUMIN/GLOBULIN RATIO: 1.7 (ref 1–2.5)
ALP BLD-CCNC: 74 U/L (ref 35–104)
ALT SERPL-CCNC: 13 U/L (ref 5–33)
ANION GAP SERPL CALCULATED.3IONS-SCNC: 12 MMOL/L (ref 9–17)
AST SERPL-CCNC: 18 U/L
BASOPHILS # BLD: 1 % (ref 0–2)
BASOPHILS ABSOLUTE: 0.1 K/UL (ref 0–0.2)
BILIRUB SERPL-MCNC: 0.57 MG/DL (ref 0.3–1.2)
BUN BLDV-MCNC: 19 MG/DL (ref 6–20)
CALCIUM SERPL-MCNC: 9.5 MG/DL (ref 8.6–10.4)
CHLORIDE BLD-SCNC: 96 MMOL/L (ref 98–107)
CO2: 26 MMOL/L (ref 20–31)
CREAT SERPL-MCNC: 0.85 MG/DL (ref 0.5–0.9)
EOSINOPHILS RELATIVE PERCENT: 7 % (ref 1–4)
GFR AFRICAN AMERICAN: >60 ML/MIN
GFR NON-AFRICAN AMERICAN: >60 ML/MIN
GFR SERPL CREATININE-BSD FRML MDRD: ABNORMAL ML/MIN/{1.73_M2}
GLUCOSE BLD-MCNC: 87 MG/DL (ref 70–99)
HCT VFR BLD CALC: 43.9 % (ref 36–46)
HEMOGLOBIN: 15.2 G/DL (ref 12–16)
LYMPHOCYTES # BLD: 26 % (ref 24–44)
MCH RBC QN AUTO: 31.8 PG (ref 26–34)
MCHC RBC AUTO-ENTMCNC: 34.5 G/DL (ref 31–37)
MCV RBC AUTO: 92.3 FL (ref 80–100)
MONOCYTES # BLD: 13 % (ref 2–11)
PDW BLD-RTO: 14.6 % (ref 12.5–15.4)
PLATELET # BLD: 146 K/UL (ref 140–450)
PMV BLD AUTO: 7.6 FL (ref 6–12)
POTASSIUM SERPL-SCNC: 3.7 MMOL/L (ref 3.7–5.3)
RBC # BLD: 4.76 M/UL (ref 4–5.2)
SEG NEUTROPHILS: 53 % (ref 36–66)
SEGMENTED NEUTROPHILS ABSOLUTE COUNT: 2.4 K/UL (ref 1.8–7.7)
SODIUM BLD-SCNC: 134 MMOL/L (ref 135–144)
TOTAL PROTEIN: 7.3 G/DL (ref 6.4–8.3)
WBC # BLD: 4.5 K/UL (ref 3.5–11)

## 2022-03-01 PROCEDURE — 6370000000 HC RX 637 (ALT 250 FOR IP): Performed by: INTERNAL MEDICINE

## 2022-03-01 PROCEDURE — 96413 CHEMO IV INFUSION 1 HR: CPT

## 2022-03-01 PROCEDURE — 80053 COMPREHEN METABOLIC PANEL: CPT

## 2022-03-01 PROCEDURE — 2500000003 HC RX 250 WO HCPCS: Performed by: INTERNAL MEDICINE

## 2022-03-01 PROCEDURE — G8482 FLU IMMUNIZE ORDER/ADMIN: HCPCS | Performed by: INTERNAL MEDICINE

## 2022-03-01 PROCEDURE — 96375 TX/PRO/DX INJ NEW DRUG ADDON: CPT

## 2022-03-01 PROCEDURE — 85025 COMPLETE CBC W/AUTO DIFF WBC: CPT

## 2022-03-01 PROCEDURE — 2580000003 HC RX 258: Performed by: INTERNAL MEDICINE

## 2022-03-01 PROCEDURE — G8427 DOCREV CUR MEDS BY ELIG CLIN: HCPCS | Performed by: INTERNAL MEDICINE

## 2022-03-01 PROCEDURE — 3017F COLORECTAL CA SCREEN DOC REV: CPT | Performed by: INTERNAL MEDICINE

## 2022-03-01 PROCEDURE — 36415 COLL VENOUS BLD VENIPUNCTURE: CPT

## 2022-03-01 PROCEDURE — 96415 CHEMO IV INFUSION ADDL HR: CPT

## 2022-03-01 PROCEDURE — G8419 CALC BMI OUT NRM PARAM NOF/U: HCPCS | Performed by: INTERNAL MEDICINE

## 2022-03-01 PROCEDURE — 99211 OFF/OP EST MAY X REQ PHY/QHP: CPT

## 2022-03-01 PROCEDURE — 6360000002 HC RX W HCPCS: Performed by: INTERNAL MEDICINE

## 2022-03-01 PROCEDURE — 99214 OFFICE O/P EST MOD 30 MIN: CPT | Performed by: INTERNAL MEDICINE

## 2022-03-01 PROCEDURE — 1036F TOBACCO NON-USER: CPT | Performed by: INTERNAL MEDICINE

## 2022-03-01 RX ORDER — DIPHENHYDRAMINE HYDROCHLORIDE 50 MG/ML
50 INJECTION INTRAMUSCULAR; INTRAVENOUS ONCE
Status: COMPLETED | OUTPATIENT
Start: 2022-03-01 | End: 2022-03-01

## 2022-03-01 RX ORDER — EPINEPHRINE 1 MG/ML
0.3 INJECTION, SOLUTION, CONCENTRATE INTRAVENOUS PRN
Status: CANCELLED | OUTPATIENT
Start: 2022-03-08

## 2022-03-01 RX ORDER — DEXAMETHASONE SODIUM PHOSPHATE 10 MG/ML
10 INJECTION INTRAMUSCULAR; INTRAVENOUS ONCE
Status: COMPLETED | OUTPATIENT
Start: 2022-03-01 | End: 2022-03-01

## 2022-03-01 RX ORDER — SODIUM CHLORIDE 9 MG/ML
25 INJECTION, SOLUTION INTRAVENOUS PRN
Status: CANCELLED | OUTPATIENT
Start: 2022-03-08

## 2022-03-01 RX ORDER — SODIUM CHLORIDE 9 MG/ML
INJECTION, SOLUTION INTRAVENOUS CONTINUOUS
Status: CANCELLED | OUTPATIENT
Start: 2022-03-08

## 2022-03-01 RX ORDER — DIPHENHYDRAMINE HYDROCHLORIDE 50 MG/ML
50 INJECTION INTRAMUSCULAR; INTRAVENOUS
Status: CANCELLED | OUTPATIENT
Start: 2022-03-08

## 2022-03-01 RX ORDER — ACETAMINOPHEN 325 MG/1
650 TABLET ORAL
Status: CANCELLED | OUTPATIENT
Start: 2022-03-08

## 2022-03-01 RX ORDER — DIPHENHYDRAMINE HYDROCHLORIDE 50 MG/ML
50 INJECTION INTRAMUSCULAR; INTRAVENOUS ONCE
Status: CANCELLED | OUTPATIENT
Start: 2022-03-08 | End: 2022-03-08

## 2022-03-01 RX ORDER — HEPARIN SODIUM (PORCINE) LOCK FLUSH IV SOLN 100 UNIT/ML 100 UNIT/ML
500 SOLUTION INTRAVENOUS PRN
Status: CANCELLED | OUTPATIENT
Start: 2022-03-08

## 2022-03-01 RX ORDER — SODIUM CHLORIDE 9 MG/ML
25 INJECTION, SOLUTION INTRAVENOUS PRN
Status: DISCONTINUED | OUTPATIENT
Start: 2022-03-01 | End: 2022-03-02 | Stop reason: HOSPADM

## 2022-03-01 RX ORDER — ACETAMINOPHEN 325 MG/1
650 TABLET ORAL ONCE
Status: CANCELLED | OUTPATIENT
Start: 2022-03-08

## 2022-03-01 RX ORDER — ALBUTEROL SULFATE 90 UG/1
4 AEROSOL, METERED RESPIRATORY (INHALATION) PRN
Status: CANCELLED | OUTPATIENT
Start: 2022-03-08

## 2022-03-01 RX ORDER — SODIUM CHLORIDE 0.9 % (FLUSH) 0.9 %
5-40 SYRINGE (ML) INJECTION PRN
Status: CANCELLED | OUTPATIENT
Start: 2022-03-08

## 2022-03-01 RX ORDER — ACETAMINOPHEN 325 MG/1
650 TABLET ORAL ONCE
Status: COMPLETED | OUTPATIENT
Start: 2022-03-01 | End: 2022-03-01

## 2022-03-01 RX ORDER — ONDANSETRON 2 MG/ML
8 INJECTION INTRAMUSCULAR; INTRAVENOUS
Status: CANCELLED | OUTPATIENT
Start: 2022-03-08

## 2022-03-01 RX ORDER — MEPERIDINE HYDROCHLORIDE 50 MG/ML
12.5 INJECTION INTRAMUSCULAR; INTRAVENOUS; SUBCUTANEOUS PRN
Status: CANCELLED | OUTPATIENT
Start: 2022-03-08

## 2022-03-01 RX ADMIN — FAMOTIDINE 20 MG: 10 INJECTION, SOLUTION INTRAVENOUS at 08:17

## 2022-03-01 RX ADMIN — DEXAMETHASONE SODIUM PHOSPHATE 10 MG: 10 INJECTION INTRAMUSCULAR; INTRAVENOUS at 08:17

## 2022-03-01 RX ADMIN — ACETAMINOPHEN 650 MG: 325 TABLET ORAL at 08:16

## 2022-03-01 RX ADMIN — SODIUM CHLORIDE 25 ML: 9 INJECTION, SOLUTION INTRAVENOUS at 08:17

## 2022-03-01 RX ADMIN — SODIUM CHLORIDE 700 MG: 9 INJECTION, SOLUTION INTRAVENOUS at 08:39

## 2022-03-01 RX ADMIN — DIPHENHYDRAMINE HYDROCHLORIDE 50 MG: 50 INJECTION, SOLUTION INTRAMUSCULAR; INTRAVENOUS at 08:18

## 2022-03-01 ASSESSMENT — PAIN SCALES - GENERAL
PAINLEVEL_OUTOF10: 7
PAINLEVEL_OUTOF10: 7

## 2022-03-01 NOTE — PATIENT INSTRUCTIONS
Please continue with rituximab without changes to finish 4 doses  Return to see me in 2 weeks with labs.

## 2022-03-01 NOTE — PLAN OF CARE
Problem: Pain:  Goal: Pain level will decrease  Description: Pain level will decrease  3/1/2022 1407 by Marylen Bock, RN  Outcome: Met This Shift  3/1/2022 0825 by Marylen Bock, RN  Outcome: Met This Shift  Goal: Control of acute pain  Description: Control of acute pain  3/1/2022 1407 by Marylen Bock, RN  Outcome: Met This Shift  3/1/2022 0825 by Marylen Bock, RN  Outcome: Met This Shift  Goal: Control of chronic pain  Description: Control of chronic pain  3/1/2022 1407 by Marylen Bock, RN  Outcome: Met This Shift  3/1/2022 0825 by Marylen Bock, RN  Outcome: Met This Shift

## 2022-03-01 NOTE — PROGRESS NOTES
Pt here for Wk 2 of Rituxan. Arrives ambulatory. C/o redness and swelling in face after tx last week, mild nausea, headache and hair thinning. Lab results reviewed. Pt was seen by Dr. Cristina Olivares, order rec'd to proceed with tx, pre-meds adjusted-see MAR. Tx tolerated without incident. Per Dr Cristina Olivares pt to continue with Wk 3 & 4 tx-same pre-meds, max rate 150mg/hr . D/c'd in stable condition. Returns 3/8/22 for Wk 3 tx.

## 2022-03-01 NOTE — PROGRESS NOTES
DIAGNOSIS:   1. Severe thrombocytopenia, likely ITP with petechia and bruising, no active bleeding, steroid dependent  2. Hashimoto thyroiditis     CURRENT THERAPY:  1. Start steroids course (high dose dex)  07/31/2018 - good initial response  2. Transition to Rituxan, with excellent response, completed September 2018   3. Relapsed ITP in January 2022, plan retreatment with rituximab. BRIEF CASE HISTORY:  Mally Latham is a very pleasant 64 y.o. female who is referred to us for thrombocytopenia. She recently had CBC via PCP which showed PLT count at 5. She has no other history of lab work. She notes she has petechia ecchymosis which started about a year ago and has been bruising easily over the last weeks. She was on steroids for shoulder dislocation a few moths ago and had GI distress. She reports the bruising started after that. She had hypothyroidism and takes Synthroid. She has not started new medication recently and does not take any blood thinners or pain medication. She has no history of stroke, heart attack, previous blood disorders, diabetes, or cancer. She does not drink alcohol or smoke cigarettes. Started on steroids 07/31/2018 - good initial response. She was found to be steroid dependent, plan for Rituxan. Excellent response. She had persistent bone pain and bone marrow biopsy was done and was negative. Her nephew was diagnosed with ITP 11/2019 at age 55.    she was later diagnosed with hashimoto thyroiditis. The patient was monitored and off treatment for about 3 years but in 2022, her platelets were down to 28 and she required another treatment. INTERIM HISTORY: The patient presents for follow up for Rituxan infusion for ITP. She continues to struggle with infusion reactions and residual headache and nausea. She reports hair loss.      PAST MEDICAL HISTORY: has a past medical history of Acute ITP ([address] Atrial fibrillation ([address] Hyperthyroidism, and Wears dentures. PAST SURGICAL HISTORY: has a past surgical history that includes  section (). CURRENT MEDICATIONS:  has a current medication list which includes the following prescription(s): hydrochlorothiazide, omeprazole, levothyroxine, ondansetron, diclofenac sodium, wheat dextrin-calcium, vitamin d3, biotin, potassium, and homeopathic products, and the following Facility-Administered Medications: sodium chloride. ALLERGIES:  is allergic to bee venom and nuts [peanut-containing drug products]. FAMILY HISTORY: Mother and nephew: colon cancer; Aunt: bladder cancer; mother, aunt, 2 nieces with Hashimoto's; nephew ITP     SOCIAL HISTORY:  reports that she has never smoked. She has never used smokeless tobacco. She reports current alcohol use. She reports that she does not use drugs. REVIEW OF SYSTEMS:   General: No fever or night sweats. Weight is stable. Worsening fatigue  ENT: No double or blurred vision, no tinnitus or hearing problem, no dysphagia or sore throat   Respiratory: No chest pain, no shortness of breath, no cough or hemoptysis. Cardiovascular: Denies chest pain, PND or orthopnea. No L E swelling or palpitations. Gastrointestinal:  No vomiting, diarrhea or constipation. +intermittent abdominal pain, nausea  Genitourinary: Denies dysuria, hematuria, frequency, urgency or incontinence. Neurological: Denies decreased LOC, no sensory or motor focal deficits. +headaches   Musculoskeletal:  No arthralgia no back pain or joint swelling. Skin: There are no rashes or bleeding. Psychiatric:  No anxiety, no depression. Endocrine: No diabetes or thyroid disease. Hematologic: No bleeding, no adenopathy. PHYSICAL EXAM: Shows a well appearing 64y.o.-year-old female who is not in pain or distress. Vital Signs: Blood pressure (!) 178/97, pulse 68, temperature 97.9 °F (36.6 °C), temperature source Oral, resp. rate 16, weight 161 lb (73 kg), not currently breastfeeding.  HEENT: Normocephalic and atraumatic. Pupils are equal, round, reactive to light and accommodation. Extraocular muscles are intact. Neck: Showed no JVD, no carotid bruit . Lungs: Clear to auscultation bilaterally. Heart: Regular without any murmur. Abdomen: Soft, mild tenderness LUQ. No hepatosplenomegaly. Extremities: No point tenderness in knee or hip. Lower extremities show no edema, clubbing, or cyanosis. Breasts: Left: no mass appreciated Right: no masses, no lesions Neuro exam: intact cranial nerves bilaterally no motor or sensory deficit, gait is normal. Lymphatic: no adenopathy appreciated in the supraclavicular, axillary, cervical or inguinal area Skin: petechia and bruising; bone marrow site is well healed    REVIEW OF LABORATORY DATA:   Lab Results   Component Value Date    WBC 4.5 03/01/2022    HGB 15.2 03/01/2022    HCT 43.9 03/01/2022    MCV 92.3 03/01/2022     03/01/2022           Chemistry        Component Value Date/Time     (L) 03/01/2022 0735    K 3.7 03/01/2022 0735    CL 96 (L) 03/01/2022 0735    CO2 26 03/01/2022 0735    BUN 19 03/01/2022 0735    CREATININE 0.85 03/01/2022 0735        Component Value Date/Time    CALCIUM 9.5 03/01/2022 0735    ALKPHOS 74 03/01/2022 0735    AST 18 03/01/2022 0735    ALT 13 03/01/2022 0735    BILITOT 0.57 03/01/2022 0735          PATHOLOGY:     REVIEW OF RADIOLOGICAL RESULTS:     IMPRESSION:   1. ITP, steroid refractory  2. Started 4 day course of steroids - good initial response. 3. Steroid dependent, plan to transition to Rituxan - excellent response  4. Persistent bone pain, bone marrow was done, negative, ITP active but controlled  5. Hashimoto thyroiditis, on synthroid   6. Left breast mass seen on CT scan, work-up in progress    PLAN:   1. Her lab work shows counts in range. 2. I completed toxicity check. 3. She continues to struggle with infusion reactions and ongoing side effects with headache, flushing, nausea.    4. I discussed plan for Promacta with continued reaction or to discontinue and monitor based on PLT recovery. 5. We will treat today as per orders. 6. Return based on plan. Addendum  Patient did not have any reactions, will plan to finish 4 doses of rituximab. Scribe Attestation   This note was created by Molly Giraldo acting as scribe for the physician signing this note  Electronically Signed  Molly Giraldo, 3/1/2022  Scribe, [employer]. Attending Attestation   Note was reviewed and edited.   I am in agreement with the note as entered    Dale Anthony MD  Hematologist/Medical [id number] [facility] hematology oncology physicians

## 2022-03-01 NOTE — TELEPHONE ENCOUNTER
AVS from 3/1/22     Please continue with rituximab without changes to finish 4 doses  Return to see me in 2 weeks with labs.       Continue tx as scheduled  rv scheduled for 3/15/22 @ 8:45 am with tx at 8    Pt was given AVS and appt schedule

## 2022-03-08 ENCOUNTER — HOSPITAL ENCOUNTER (OUTPATIENT)
Dept: INFUSION THERAPY | Age: 57
Discharge: HOME | End: 2022-03-08
Payer: MEDICARE

## 2022-03-08 VITALS
TEMPERATURE: 98.1 F | RESPIRATION RATE: 16 BRPM | WEIGHT: 158.6 LBS | HEART RATE: 66 BPM | BODY MASS INDEX: 25.6 KG/M2 | DIASTOLIC BLOOD PRESSURE: 89 MMHG | OXYGEN SATURATION: 100 % | SYSTOLIC BLOOD PRESSURE: 151 MMHG

## 2022-03-08 DIAGNOSIS — D69.3 ACUTE ITP (HCC): Primary | ICD-10-CM

## 2022-03-08 DIAGNOSIS — D69.3 CHRONIC ITP (IDIOPATHIC THROMBOCYTOPENIA) (HCC): ICD-10-CM

## 2022-03-08 LAB
ABSOLUTE EOS #: 0.2 K/UL (ref 0–0.4)
ABSOLUTE LYMPH #: 1.1 K/UL (ref 1–4.8)
ABSOLUTE MONO #: 0.4 K/UL (ref 0.1–1.2)
BASOPHILS # BLD: 1 % (ref 0–2)
BASOPHILS ABSOLUTE: 0 K/UL (ref 0–0.2)
EOSINOPHILS RELATIVE PERCENT: 5 % (ref 1–4)
HCT VFR BLD CALC: 44.4 % (ref 36–46)
HEMOGLOBIN: 15.1 G/DL (ref 12–16)
LYMPHOCYTES # BLD: 24 % (ref 24–44)
MCH RBC QN AUTO: 31.6 PG (ref 26–34)
MCHC RBC AUTO-ENTMCNC: 34 G/DL (ref 31–37)
MCV RBC AUTO: 92.9 FL (ref 80–100)
MONOCYTES # BLD: 8 % (ref 2–11)
PDW BLD-RTO: 14.3 % (ref 12.5–15.4)
PLATELET # BLD: 107 K/UL (ref 140–450)
PMV BLD AUTO: 8.2 FL (ref 6–12)
RBC # BLD: 4.78 M/UL (ref 4–5.2)
SEG NEUTROPHILS: 62 % (ref 36–66)
SEGMENTED NEUTROPHILS ABSOLUTE COUNT: 2.9 K/UL (ref 1.8–7.7)
WBC # BLD: 4.6 K/UL (ref 3.5–11)

## 2022-03-08 PROCEDURE — 2500000003 HC RX 250 WO HCPCS: Performed by: INTERNAL MEDICINE

## 2022-03-08 PROCEDURE — 36415 COLL VENOUS BLD VENIPUNCTURE: CPT

## 2022-03-08 PROCEDURE — 6370000000 HC RX 637 (ALT 250 FOR IP): Performed by: INTERNAL MEDICINE

## 2022-03-08 PROCEDURE — 96413 CHEMO IV INFUSION 1 HR: CPT

## 2022-03-08 PROCEDURE — 2580000003 HC RX 258: Performed by: INTERNAL MEDICINE

## 2022-03-08 PROCEDURE — 96375 TX/PRO/DX INJ NEW DRUG ADDON: CPT

## 2022-03-08 PROCEDURE — 85025 COMPLETE CBC W/AUTO DIFF WBC: CPT

## 2022-03-08 PROCEDURE — 6360000002 HC RX W HCPCS: Performed by: INTERNAL MEDICINE

## 2022-03-08 PROCEDURE — 96415 CHEMO IV INFUSION ADDL HR: CPT

## 2022-03-08 RX ORDER — DEXAMETHASONE SODIUM PHOSPHATE 10 MG/ML
10 INJECTION INTRAMUSCULAR; INTRAVENOUS ONCE
Status: CANCELLED
Start: 2022-03-15 | End: 2022-03-15

## 2022-03-08 RX ORDER — SODIUM CHLORIDE 9 MG/ML
25 INJECTION, SOLUTION INTRAVENOUS PRN
Status: CANCELLED | OUTPATIENT
Start: 2022-03-15

## 2022-03-08 RX ORDER — DIPHENHYDRAMINE HYDROCHLORIDE 50 MG/ML
50 INJECTION INTRAMUSCULAR; INTRAVENOUS ONCE
Status: COMPLETED | OUTPATIENT
Start: 2022-03-08 | End: 2022-03-08

## 2022-03-08 RX ORDER — SODIUM CHLORIDE 0.9 % (FLUSH) 0.9 %
5-40 SYRINGE (ML) INJECTION PRN
Status: CANCELLED | OUTPATIENT
Start: 2022-03-15

## 2022-03-08 RX ORDER — SODIUM CHLORIDE 9 MG/ML
INJECTION, SOLUTION INTRAVENOUS CONTINUOUS
Status: CANCELLED | OUTPATIENT
Start: 2022-03-15

## 2022-03-08 RX ORDER — MEPERIDINE HYDROCHLORIDE 50 MG/ML
12.5 INJECTION INTRAMUSCULAR; INTRAVENOUS; SUBCUTANEOUS PRN
Status: CANCELLED | OUTPATIENT
Start: 2022-03-15

## 2022-03-08 RX ORDER — DIPHENHYDRAMINE HYDROCHLORIDE 50 MG/ML
50 INJECTION INTRAMUSCULAR; INTRAVENOUS
Status: CANCELLED | OUTPATIENT
Start: 2022-03-15

## 2022-03-08 RX ORDER — HEPARIN SODIUM (PORCINE) LOCK FLUSH IV SOLN 100 UNIT/ML 100 UNIT/ML
500 SOLUTION INTRAVENOUS PRN
Status: CANCELLED | OUTPATIENT
Start: 2022-03-15

## 2022-03-08 RX ORDER — DIPHENHYDRAMINE HYDROCHLORIDE 50 MG/ML
50 INJECTION INTRAMUSCULAR; INTRAVENOUS ONCE
Status: CANCELLED | OUTPATIENT
Start: 2022-03-15 | End: 2022-03-15

## 2022-03-08 RX ORDER — ALBUTEROL SULFATE 90 UG/1
4 AEROSOL, METERED RESPIRATORY (INHALATION) PRN
Status: CANCELLED | OUTPATIENT
Start: 2022-03-15

## 2022-03-08 RX ORDER — ACETAMINOPHEN 325 MG/1
650 TABLET ORAL ONCE
Status: CANCELLED | OUTPATIENT
Start: 2022-03-15

## 2022-03-08 RX ORDER — SODIUM CHLORIDE 9 MG/ML
INJECTION, SOLUTION INTRAVENOUS CONTINUOUS
Status: ACTIVE | OUTPATIENT
Start: 2022-03-08 | End: 2022-03-08

## 2022-03-08 RX ORDER — EPINEPHRINE 1 MG/ML
0.3 INJECTION, SOLUTION, CONCENTRATE INTRAVENOUS PRN
Status: CANCELLED | OUTPATIENT
Start: 2022-03-15

## 2022-03-08 RX ORDER — ACETAMINOPHEN 325 MG/1
650 TABLET ORAL ONCE
Status: COMPLETED | OUTPATIENT
Start: 2022-03-08 | End: 2022-03-08

## 2022-03-08 RX ORDER — ACETAMINOPHEN 325 MG/1
650 TABLET ORAL
Status: CANCELLED | OUTPATIENT
Start: 2022-03-15

## 2022-03-08 RX ORDER — ONDANSETRON 2 MG/ML
8 INJECTION INTRAMUSCULAR; INTRAVENOUS
Status: CANCELLED | OUTPATIENT
Start: 2022-03-15

## 2022-03-08 RX ORDER — DEXAMETHASONE SODIUM PHOSPHATE 10 MG/ML
10 INJECTION INTRAMUSCULAR; INTRAVENOUS ONCE
Status: COMPLETED | OUTPATIENT
Start: 2022-03-08 | End: 2022-03-08

## 2022-03-08 RX ADMIN — DEXAMETHASONE SODIUM PHOSPHATE 10 MG: 10 INJECTION INTRAMUSCULAR; INTRAVENOUS at 08:35

## 2022-03-08 RX ADMIN — SODIUM CHLORIDE 700 MG: 9 INJECTION, SOLUTION INTRAVENOUS at 09:11

## 2022-03-08 RX ADMIN — FAMOTIDINE 20 MG: 10 INJECTION, SOLUTION INTRAVENOUS at 08:39

## 2022-03-08 RX ADMIN — SODIUM CHLORIDE: 9 INJECTION, SOLUTION INTRAVENOUS at 08:29

## 2022-03-08 RX ADMIN — DIPHENHYDRAMINE HYDROCHLORIDE 50 MG: 50 INJECTION, SOLUTION INTRAMUSCULAR; INTRAVENOUS at 08:42

## 2022-03-08 RX ADMIN — ACETAMINOPHEN 650 MG: 325 TABLET ORAL at 08:35

## 2022-03-08 ASSESSMENT — PAIN SCALES - GENERAL: PAINLEVEL_OUTOF10: 0

## 2022-03-08 NOTE — ONCOLOGY
Patient arrived for week 3 ruxience. Pt with no complaints and labs reviewed. Treatment completed without issue. Pt stable at discharge. Scheduled to return 3/15/22 for MD visit and treatment.

## 2022-03-15 ENCOUNTER — TELEPHONE (OUTPATIENT)
Dept: ONCOLOGY | Age: 57
End: 2022-03-15

## 2022-03-15 ENCOUNTER — HOSPITAL ENCOUNTER (OUTPATIENT)
Dept: INFUSION THERAPY | Age: 57
Discharge: HOME | End: 2022-03-15
Payer: MEDICARE

## 2022-03-15 ENCOUNTER — OFFICE VISIT (OUTPATIENT)
Dept: ONCOLOGY | Age: 57
End: 2022-03-15
Payer: MEDICARE

## 2022-03-15 VITALS
DIASTOLIC BLOOD PRESSURE: 93 MMHG | SYSTOLIC BLOOD PRESSURE: 162 MMHG | RESPIRATION RATE: 16 BRPM | TEMPERATURE: 98.4 F | BODY MASS INDEX: 25.76 KG/M2 | WEIGHT: 159.6 LBS | HEART RATE: 66 BPM

## 2022-03-15 DIAGNOSIS — D69.3 ACUTE ITP (HCC): ICD-10-CM

## 2022-03-15 DIAGNOSIS — D69.3 CHRONIC ITP (IDIOPATHIC THROMBOCYTOPENIA) (HCC): Primary | ICD-10-CM

## 2022-03-15 DIAGNOSIS — N63.0 BREAST MASS: ICD-10-CM

## 2022-03-15 DIAGNOSIS — Z86.2 HISTORY OF ITP: Primary | ICD-10-CM

## 2022-03-15 LAB
ABSOLUTE EOS #: 0.2 K/UL (ref 0–0.4)
ABSOLUTE LYMPH #: 1.1 K/UL (ref 1–4.8)
ABSOLUTE MONO #: 0.5 K/UL (ref 0.1–1.2)
BASOPHILS # BLD: 1 % (ref 0–2)
BASOPHILS ABSOLUTE: 0 K/UL (ref 0–0.2)
EOSINOPHILS RELATIVE PERCENT: 6 % (ref 1–4)
HCT VFR BLD CALC: 43.7 % (ref 36–46)
HEMOGLOBIN: 15 G/DL (ref 12–16)
LYMPHOCYTES # BLD: 26 % (ref 24–44)
MCH RBC QN AUTO: 31.8 PG (ref 26–34)
MCHC RBC AUTO-ENTMCNC: 34.2 G/DL (ref 31–37)
MCV RBC AUTO: 92.9 FL (ref 80–100)
MONOCYTES # BLD: 12 % (ref 2–11)
PDW BLD-RTO: 13.8 % (ref 12.5–15.4)
PLATELET # BLD: 64 K/UL (ref 140–450)
PMV BLD AUTO: 8.2 FL (ref 6–12)
RBC # BLD: 4.7 M/UL (ref 4–5.2)
SEG NEUTROPHILS: 55 % (ref 36–66)
SEGMENTED NEUTROPHILS ABSOLUTE COUNT: 2.4 K/UL (ref 1.8–7.7)
WBC # BLD: 4.3 K/UL (ref 3.5–11)

## 2022-03-15 PROCEDURE — G8419 CALC BMI OUT NRM PARAM NOF/U: HCPCS | Performed by: INTERNAL MEDICINE

## 2022-03-15 PROCEDURE — G8482 FLU IMMUNIZE ORDER/ADMIN: HCPCS | Performed by: INTERNAL MEDICINE

## 2022-03-15 PROCEDURE — 96413 CHEMO IV INFUSION 1 HR: CPT

## 2022-03-15 PROCEDURE — 6360000002 HC RX W HCPCS: Performed by: INTERNAL MEDICINE

## 2022-03-15 PROCEDURE — 1036F TOBACCO NON-USER: CPT | Performed by: INTERNAL MEDICINE

## 2022-03-15 PROCEDURE — 3017F COLORECTAL CA SCREEN DOC REV: CPT | Performed by: INTERNAL MEDICINE

## 2022-03-15 PROCEDURE — 2580000003 HC RX 258: Performed by: INTERNAL MEDICINE

## 2022-03-15 PROCEDURE — 99214 OFFICE O/P EST MOD 30 MIN: CPT | Performed by: INTERNAL MEDICINE

## 2022-03-15 PROCEDURE — 6370000000 HC RX 637 (ALT 250 FOR IP): Performed by: INTERNAL MEDICINE

## 2022-03-15 PROCEDURE — G8428 CUR MEDS NOT DOCUMENT: HCPCS | Performed by: INTERNAL MEDICINE

## 2022-03-15 PROCEDURE — 85025 COMPLETE CBC W/AUTO DIFF WBC: CPT

## 2022-03-15 PROCEDURE — 96375 TX/PRO/DX INJ NEW DRUG ADDON: CPT

## 2022-03-15 PROCEDURE — 36415 COLL VENOUS BLD VENIPUNCTURE: CPT

## 2022-03-15 PROCEDURE — 96415 CHEMO IV INFUSION ADDL HR: CPT

## 2022-03-15 PROCEDURE — 2500000003 HC RX 250 WO HCPCS: Performed by: INTERNAL MEDICINE

## 2022-03-15 RX ORDER — SODIUM CHLORIDE 0.9 % (FLUSH) 0.9 %
5-40 SYRINGE (ML) INJECTION PRN
OUTPATIENT
Start: 2022-03-22

## 2022-03-15 RX ORDER — ALBUTEROL SULFATE 90 UG/1
4 AEROSOL, METERED RESPIRATORY (INHALATION) PRN
OUTPATIENT
Start: 2022-03-22

## 2022-03-15 RX ORDER — DEXAMETHASONE SODIUM PHOSPHATE 10 MG/ML
10 INJECTION INTRAMUSCULAR; INTRAVENOUS ONCE
Status: COMPLETED | OUTPATIENT
Start: 2022-03-15 | End: 2022-03-15

## 2022-03-15 RX ORDER — SODIUM CHLORIDE 9 MG/ML
25 INJECTION, SOLUTION INTRAVENOUS PRN
OUTPATIENT
Start: 2022-03-22

## 2022-03-15 RX ORDER — DEXAMETHASONE SODIUM PHOSPHATE 10 MG/ML
10 INJECTION INTRAMUSCULAR; INTRAVENOUS ONCE
Start: 2022-03-22 | End: 2022-03-22

## 2022-03-15 RX ORDER — EPINEPHRINE 1 MG/ML
0.3 INJECTION, SOLUTION, CONCENTRATE INTRAVENOUS PRN
OUTPATIENT
Start: 2022-03-22

## 2022-03-15 RX ORDER — PREDNISONE 20 MG/1
60 TABLET ORAL DAILY
Qty: 30 TABLET | Refills: 0 | Status: SHIPPED | OUTPATIENT
Start: 2022-03-15 | End: 2022-03-25

## 2022-03-15 RX ORDER — SODIUM CHLORIDE 9 MG/ML
INJECTION, SOLUTION INTRAVENOUS CONTINUOUS
Status: ACTIVE | OUTPATIENT
Start: 2022-03-15 | End: 2022-03-15

## 2022-03-15 RX ORDER — DIPHENHYDRAMINE HYDROCHLORIDE 50 MG/ML
50 INJECTION INTRAMUSCULAR; INTRAVENOUS ONCE
Status: COMPLETED | OUTPATIENT
Start: 2022-03-15 | End: 2022-03-15

## 2022-03-15 RX ORDER — DIPHENHYDRAMINE HYDROCHLORIDE 50 MG/ML
50 INJECTION INTRAMUSCULAR; INTRAVENOUS
OUTPATIENT
Start: 2022-03-22

## 2022-03-15 RX ORDER — HEPARIN SODIUM (PORCINE) LOCK FLUSH IV SOLN 100 UNIT/ML 100 UNIT/ML
500 SOLUTION INTRAVENOUS PRN
OUTPATIENT
Start: 2022-03-22

## 2022-03-15 RX ORDER — ACETAMINOPHEN 325 MG/1
650 TABLET ORAL
OUTPATIENT
Start: 2022-03-22

## 2022-03-15 RX ORDER — MEPERIDINE HYDROCHLORIDE 50 MG/ML
12.5 INJECTION INTRAMUSCULAR; INTRAVENOUS; SUBCUTANEOUS PRN
OUTPATIENT
Start: 2022-03-22

## 2022-03-15 RX ORDER — SODIUM CHLORIDE 9 MG/ML
INJECTION, SOLUTION INTRAVENOUS CONTINUOUS
OUTPATIENT
Start: 2022-03-22

## 2022-03-15 RX ORDER — DIPHENHYDRAMINE HYDROCHLORIDE 50 MG/ML
50 INJECTION INTRAMUSCULAR; INTRAVENOUS ONCE
Status: CANCELLED | OUTPATIENT
Start: 2022-03-22 | End: 2022-03-22

## 2022-03-15 RX ORDER — ONDANSETRON 2 MG/ML
8 INJECTION INTRAMUSCULAR; INTRAVENOUS
OUTPATIENT
Start: 2022-03-22

## 2022-03-15 RX ORDER — ACETAMINOPHEN 325 MG/1
650 TABLET ORAL ONCE
Status: COMPLETED | OUTPATIENT
Start: 2022-03-15 | End: 2022-03-15

## 2022-03-15 RX ORDER — ACETAMINOPHEN 325 MG/1
650 TABLET ORAL ONCE
Status: CANCELLED | OUTPATIENT
Start: 2022-03-22

## 2022-03-15 RX ADMIN — ACETAMINOPHEN 650 MG: 325 TABLET ORAL at 08:16

## 2022-03-15 RX ADMIN — DIPHENHYDRAMINE HYDROCHLORIDE 50 MG: 50 INJECTION, SOLUTION INTRAMUSCULAR; INTRAVENOUS at 08:19

## 2022-03-15 RX ADMIN — SODIUM CHLORIDE: 9 INJECTION, SOLUTION INTRAVENOUS at 08:18

## 2022-03-15 RX ADMIN — SODIUM CHLORIDE 700 MG: 9 INJECTION, SOLUTION INTRAVENOUS at 09:01

## 2022-03-15 RX ADMIN — FAMOTIDINE 20 MG: 10 INJECTION, SOLUTION INTRAVENOUS at 08:18

## 2022-03-15 RX ADMIN — DEXAMETHASONE SODIUM PHOSPHATE 10 MG: 10 INJECTION INTRAMUSCULAR; INTRAVENOUS at 08:21

## 2022-03-15 ASSESSMENT — PAIN SCALES - GENERAL: PAINLEVEL_OUTOF10: 0

## 2022-03-15 NOTE — TELEPHONE ENCOUNTER
Proceed with rituximab today per orders, see orders for steroid.   Needs CBC in early morning on Friday 3/18  Return to see me in 2 weeks with CBC and CMP    Tx today as scheduled    CBC added to 3/18 scan appt    RV scheduled 3/29/22 @ 11:45am     PT was seen during tx, AVS and an appt schedule printed, [facility] notified to have patient  at [facility] desk after treatment    Electronically signed by Fortino Magallon on 3/15/2022 at 10:34 AM

## 2022-03-15 NOTE — PATIENT INSTRUCTIONS
Proceed with rituximab today per orders, see orders for steroid.   Needs CBC in early morning on Friday 3/18  Return to see me in 2 weeks with CBC and CMP

## 2022-03-15 NOTE — PROGRESS NOTES
DIAGNOSIS:   1. Severe thrombocytopenia, likely ITP with petechia and bruising, no active bleeding, steroid dependent  2. Hashimoto thyroiditis     CURRENT THERAPY:  1. Start steroids course (high dose dex)  07/31/2018 - good initial response  2. Transition to Rituxan, with excellent response, completed September 2018   3. Relapsed ITP in January 2022, plan retreatment with rituximab. BRIEF CASE HISTORY:  Shakira Biswas is a very pleasant 64 y.o. female who is referred to us for thrombocytopenia. She recently had CBC via PCP which showed PLT count at 5. She has no other history of lab work. She notes she has petechia ecchymosis which started about a year ago and has been bruising easily over the last weeks. She was on steroids for shoulder dislocation a few moths ago and had GI distress. She reports the bruising started after that. She had hypothyroidism and takes Synthroid. She has not started new medication recently and does not take any blood thinners or pain medication. She has no history of stroke, heart attack, previous blood disorders, diabetes, or cancer. She does not drink alcohol or smoke cigarettes. Started on steroids 07/31/2018 - good initial response. She was found to be steroid dependent, plan for Rituxan. Excellent response. She had persistent bone pain and bone marrow biopsy was done and was negative. Her nephew was diagnosed with ITP 11/2019 at age 55.    she was later diagnosed with hashimoto thyroiditis. The patient was monitored and off treatment for about 3 years but in 2022, her platelets were down to 28 and she required another treatment. INTERIM HISTORY: The patient presents for follow up for Rituxan infusion for ITP. She is off all steroids. She has breast biopsy scheduled for this week. PAST MEDICAL HISTORY: has a past medical history of Acute ITP ([address] Atrial fibrillation ([address] Hyperthyroidism, and Wears dentures.     PAST SURGICAL HISTORY: has a past surgical history that includes  section (). CURRENT MEDICATIONS:  has a current medication list which includes the following prescription(s): hydrochlorothiazide, omeprazole, levothyroxine, ondansetron, diclofenac sodium, wheat dextrin-calcium, vitamin d3, biotin, potassium, and homeopathic products, and the following Facility-Administered Medications: sodium chloride. ALLERGIES:  is allergic to bee venom and nuts [peanut-containing drug products]. FAMILY HISTORY: Mother and nephew: colon cancer; Aunt: bladder cancer; mother, aunt, 2 nieces with Hashimoto's; nephew ITP     SOCIAL HISTORY:  reports that she has never smoked. She has never used smokeless tobacco. She reports current alcohol use. She reports that she does not use drugs. REVIEW OF SYSTEMS:   General: No fever or night sweats. Weight is stable. Worsening fatigue  ENT: No double or blurred vision, no tinnitus or hearing problem, no dysphagia or sore throat   Respiratory: No chest pain, no shortness of breath, no cough or hemoptysis. Cardiovascular: Denies chest pain, PND or orthopnea. No L E swelling or palpitations. Gastrointestinal:  No vomiting, diarrhea or constipation. +intermittent abdominal pain, nausea  Genitourinary: Denies dysuria, hematuria, frequency, urgency or incontinence. Neurological: Denies decreased LOC, no sensory or motor focal deficits. +headaches   Musculoskeletal:  No arthralgia no back pain or joint swelling. Skin: There are no rashes or bleeding. Psychiatric:  No anxiety, no depression. Endocrine: No diabetes or thyroid disease. Hematologic: No bleeding, no adenopathy. PHYSICAL EXAM: Shows a well appearing 64y.o.-year-old female who is not in pain or distress. Vital Signs: not currently breastfeeding. HEENT: Normocephalic and atraumatic. Pupils are equal, round, reactive to light and accommodation. Extraocular muscles are intact. Neck: Showed no JVD, no carotid bruit .  Lungs: Clear to auscultation bilaterally. Heart: Regular without any murmur. Abdomen: Soft, mild tenderness LUQ. No hepatosplenomegaly. Extremities: No point tenderness in knee or hip. Lower extremities show no edema, clubbing, or cyanosis. Breasts: Left: no mass appreciated Right: no masses, no lesions Neuro exam: intact cranial nerves bilaterally no motor or sensory deficit, gait is normal. Lymphatic: no adenopathy appreciated in the supraclavicular, axillary, cervical or inguinal area Skin: petechia and bruising; bone marrow site is well healed    REVIEW OF LABORATORY DATA:   Lab Results   Component Value Date    WBC 4.3 03/15/2022    HGB 15.0 03/15/2022    HCT 43.7 03/15/2022    MCV 92.9 03/15/2022    PLT 64 (L) 03/15/2022           Chemistry        Component Value Date/Time     (L) 03/01/2022 0735    K 3.7 03/01/2022 0735    CL 96 (L) 03/01/2022 0735    CO2 26 03/01/2022 0735    BUN 19 03/01/2022 0735    CREATININE 0.85 03/01/2022 0735        Component Value Date/Time    CALCIUM 9.5 03/01/2022 0735    ALKPHOS 74 03/01/2022 0735    AST 18 03/01/2022 0735    ALT 13 03/01/2022 0735    BILITOT 0.57 03/01/2022 0735          PATHOLOGY:     REVIEW OF RADIOLOGICAL RESULTS:     IMPRESSION:   1. ITP, steroid refractory  2. Started 4 day course of steroids - good initial response. 3. Steroid dependent, plan to transition to Rituxan - excellent response  4. Persistent bone pain, bone marrow was done, negative, ITP active but controlled  5. Hashimoto thyroiditis, on synthroid   6. Left breast mass seen on CT scan, work-up in progress    PLAN:   1. Her lab work was discussed, her PLT are dropping. Sometimes, rituximab takes a while to kick in. But she has an upcoming procedure in the next few days. I decided to add steroids and hopefully she will have a quick response. We will check CBC on the morning of the procedure. 2. Okay to proceed with the procedure if the platelets are above 55,199.   Otherwise I would reschedule. 3. Another option is to offer IVIG if she is steroid refractory. 4. I completed toxicity check. 5. Return in 2 week with repeat lab work and to review biopsy. Scribe Attestation   This note was created by Amy Lauren acting as scribe for the physician signing this note  Electronically Signed  Amy Lauren, 3/15/2022  Scribe, [employer]. Attending Attestation   Note was reviewed and edited.   I am in agreement with the note as entered    Beryle Dross Al-Nsour,MD  Hematologist/Medical [id number] [facility] hematology oncology physicians

## 2022-03-15 NOTE — PROGRESS NOTES
Pt here for 4 of 4 Ruxience. Lab results reviewed. Pt seen by Dr Jacques Duke at chair side for f/u. Infusion complete without incident. Pt d/c'd in stable condition. Returns 3-29-22 for MD f/u.

## 2022-03-17 ENCOUNTER — HOSPITAL ENCOUNTER (OUTPATIENT)
Age: 57
Discharge: HOME | End: 2022-03-17
Payer: MEDICARE

## 2022-03-17 DIAGNOSIS — D69.3 CHRONIC ITP (IDIOPATHIC THROMBOCYTOPENIA) (HCC): ICD-10-CM

## 2022-03-17 LAB
ABSOLUTE EOS #: 0 K/UL (ref 0–0.4)
ABSOLUTE LYMPH #: 0.8 K/UL (ref 1–4.8)
ABSOLUTE MONO #: 0.4 K/UL (ref 0.1–1.2)
ALBUMIN SERPL-MCNC: 4.8 G/DL (ref 3.5–5.2)
ALBUMIN/GLOBULIN RATIO: 1.7 (ref 1–2.5)
ALP BLD-CCNC: 67 U/L (ref 35–104)
ALT SERPL-CCNC: 17 U/L (ref 5–33)
ANION GAP SERPL CALCULATED.3IONS-SCNC: 14 MMOL/L (ref 9–17)
AST SERPL-CCNC: 16 U/L
BASOPHILS # BLD: 0 % (ref 0–2)
BASOPHILS ABSOLUTE: 0 K/UL (ref 0–0.2)
BILIRUB SERPL-MCNC: 0.47 MG/DL (ref 0.3–1.2)
BUN BLDV-MCNC: 17 MG/DL (ref 6–20)
CALCIUM SERPL-MCNC: 9.6 MG/DL (ref 8.6–10.4)
CHLORIDE BLD-SCNC: 98 MMOL/L (ref 98–107)
CO2: 26 MMOL/L (ref 20–31)
CREAT SERPL-MCNC: 0.94 MG/DL (ref 0.5–0.9)
EOSINOPHILS RELATIVE PERCENT: 0 % (ref 1–4)
GFR AFRICAN AMERICAN: >60 ML/MIN
GFR NON-AFRICAN AMERICAN: >60 ML/MIN
GFR SERPL CREATININE-BSD FRML MDRD: ABNORMAL ML/MIN/{1.73_M2}
GLUCOSE BLD-MCNC: 124 MG/DL (ref 70–99)
HCT VFR BLD CALC: 43.3 % (ref 36–46)
HEMOGLOBIN: 14.8 G/DL (ref 12–16)
LYMPHOCYTES # BLD: 10 % (ref 24–44)
MCH RBC QN AUTO: 31.9 PG (ref 26–34)
MCHC RBC AUTO-ENTMCNC: 34.3 G/DL (ref 31–37)
MCV RBC AUTO: 93.2 FL (ref 80–100)
MONOCYTES # BLD: 5 % (ref 2–11)
PDW BLD-RTO: 13.9 % (ref 12.5–15.4)
PLATELET # BLD: 141 K/UL (ref 140–450)
PMV BLD AUTO: 8.1 FL (ref 6–12)
POTASSIUM SERPL-SCNC: 3.6 MMOL/L (ref 3.7–5.3)
RBC # BLD: 4.65 M/UL (ref 4–5.2)
SEG NEUTROPHILS: 85 % (ref 36–66)
SEGMENTED NEUTROPHILS ABSOLUTE COUNT: 7.1 K/UL (ref 1.8–7.7)
SODIUM BLD-SCNC: 138 MMOL/L (ref 135–144)
TOTAL PROTEIN: 7.6 G/DL (ref 6.4–8.3)
WBC # BLD: 8.4 K/UL (ref 3.5–11)

## 2022-03-17 PROCEDURE — 85025 COMPLETE CBC W/AUTO DIFF WBC: CPT

## 2022-03-17 PROCEDURE — 36415 COLL VENOUS BLD VENIPUNCTURE: CPT

## 2022-03-17 PROCEDURE — 80053 COMPREHEN METABOLIC PANEL: CPT

## 2022-03-18 ENCOUNTER — HOSPITAL ENCOUNTER (OUTPATIENT)
Dept: MAMMOGRAPHY | Age: 57
Discharge: HOME | End: 2022-03-20
Payer: MEDICARE

## 2022-03-18 ENCOUNTER — HOSPITAL ENCOUNTER (OUTPATIENT)
Dept: ULTRASOUND IMAGING | Age: 57
Discharge: HOME | End: 2022-03-20
Payer: MEDICARE

## 2022-03-18 VITALS — SYSTOLIC BLOOD PRESSURE: 162 MMHG | HEART RATE: 63 BPM | DIASTOLIC BLOOD PRESSURE: 90 MMHG

## 2022-03-18 DIAGNOSIS — N63.20 LEFT BREAST MASS: ICD-10-CM

## 2022-03-18 DIAGNOSIS — Z98.890 STATUS POST BREAST BIOPSY: ICD-10-CM

## 2022-03-18 PROCEDURE — 19083 BX BREAST 1ST LESION US IMAG: CPT

## 2022-03-18 PROCEDURE — 88305 TISSUE EXAM BY PATHOLOGIST: CPT

## 2022-03-18 PROCEDURE — 77065 DX MAMMO INCL CAD UNI: CPT

## 2022-03-18 PROCEDURE — 19084 BX BREAST ADD LESION US IMAG: CPT

## 2022-03-18 PROCEDURE — 2500000003 HC RX 250 WO HCPCS

## 2022-03-21 LAB — SURGICAL PATHOLOGY REPORT: NORMAL

## 2022-03-22 ENCOUNTER — TELEPHONE (OUTPATIENT)
Dept: MAMMOGRAPHY | Age: 57
End: 2022-03-22

## 2022-03-22 NOTE — TELEPHONE ENCOUNTER
Spoke to pt regarding her normal breast biopsy results. Radiology is recommending [facility], pt understood.

## 2022-03-29 ENCOUNTER — OFFICE VISIT (OUTPATIENT)
Dept: ONCOLOGY | Age: 57
End: 2022-03-29
Payer: MEDICARE

## 2022-03-29 ENCOUNTER — TELEPHONE (OUTPATIENT)
Dept: ONCOLOGY | Age: 57
End: 2022-03-29

## 2022-03-29 ENCOUNTER — HOSPITAL ENCOUNTER (OUTPATIENT)
Age: 57
Discharge: HOME | End: 2022-03-29
Payer: MEDICARE

## 2022-03-29 VITALS
TEMPERATURE: 97.7 F | HEART RATE: 63 BPM | DIASTOLIC BLOOD PRESSURE: 90 MMHG | SYSTOLIC BLOOD PRESSURE: 161 MMHG | BODY MASS INDEX: 26.31 KG/M2 | WEIGHT: 163 LBS

## 2022-03-29 DIAGNOSIS — I10 ESSENTIAL HYPERTENSION: ICD-10-CM

## 2022-03-29 DIAGNOSIS — D69.3 ACUTE ITP (HCC): ICD-10-CM

## 2022-03-29 DIAGNOSIS — D69.3 CHRONIC ITP (IDIOPATHIC THROMBOCYTOPENIA) (HCC): ICD-10-CM

## 2022-03-29 LAB
ABSOLUTE EOS #: 0.2 K/UL (ref 0–0.4)
ABSOLUTE LYMPH #: 1.3 K/UL (ref 1–4.8)
ABSOLUTE MONO #: 0.7 K/UL (ref 0.1–1.2)
ALBUMIN SERPL-MCNC: 3.9 G/DL (ref 3.5–5.2)
ALBUMIN/GLOBULIN RATIO: 1.5 (ref 1–2.5)
ALP BLD-CCNC: 64 U/L (ref 35–104)
ALT SERPL-CCNC: 17 U/L (ref 5–33)
ANION GAP SERPL CALCULATED.3IONS-SCNC: 9 MMOL/L (ref 9–17)
AST SERPL-CCNC: 15 U/L
BASOPHILS # BLD: 1 % (ref 0–2)
BASOPHILS ABSOLUTE: 0 K/UL (ref 0–0.2)
BILIRUB SERPL-MCNC: 0.36 MG/DL (ref 0.3–1.2)
BUN BLDV-MCNC: 15 MG/DL (ref 6–20)
CALCIUM SERPL-MCNC: 8.6 MG/DL (ref 8.6–10.4)
CHLORIDE BLD-SCNC: 99 MMOL/L (ref 98–107)
CO2: 30 MMOL/L (ref 20–31)
CREAT SERPL-MCNC: 0.81 MG/DL (ref 0.5–0.9)
EOSINOPHILS RELATIVE PERCENT: 4 % (ref 1–4)
GFR AFRICAN AMERICAN: >60 ML/MIN
GFR NON-AFRICAN AMERICAN: >60 ML/MIN
GFR SERPL CREATININE-BSD FRML MDRD: ABNORMAL ML/MIN/{1.73_M2}
GLUCOSE BLD-MCNC: 78 MG/DL (ref 70–99)
HCT VFR BLD CALC: 45.2 % (ref 36–46)
HEMOGLOBIN: 15.2 G/DL (ref 12–16)
LYMPHOCYTES # BLD: 19 % (ref 24–44)
MCH RBC QN AUTO: 31.6 PG (ref 26–34)
MCHC RBC AUTO-ENTMCNC: 33.7 G/DL (ref 31–37)
MCV RBC AUTO: 94 FL (ref 80–100)
MONOCYTES # BLD: 11 % (ref 2–11)
PDW BLD-RTO: 13.7 % (ref 12.5–15.4)
PLATELET # BLD: 121 K/UL (ref 140–450)
PMV BLD AUTO: 7.2 FL (ref 6–12)
POTASSIUM SERPL-SCNC: 3.1 MMOL/L (ref 3.7–5.3)
RBC # BLD: 4.81 M/UL (ref 4–5.2)
SEG NEUTROPHILS: 65 % (ref 36–66)
SEGMENTED NEUTROPHILS ABSOLUTE COUNT: 4.5 K/UL (ref 1.8–7.7)
SODIUM BLD-SCNC: 138 MMOL/L (ref 135–144)
TOTAL PROTEIN: 6.5 G/DL (ref 6.4–8.3)
WBC # BLD: 6.8 K/UL (ref 3.5–11)

## 2022-03-29 PROCEDURE — 3017F COLORECTAL CA SCREEN DOC REV: CPT | Performed by: INTERNAL MEDICINE

## 2022-03-29 PROCEDURE — 36415 COLL VENOUS BLD VENIPUNCTURE: CPT

## 2022-03-29 PROCEDURE — G8427 DOCREV CUR MEDS BY ELIG CLIN: HCPCS | Performed by: INTERNAL MEDICINE

## 2022-03-29 PROCEDURE — 85025 COMPLETE CBC W/AUTO DIFF WBC: CPT

## 2022-03-29 PROCEDURE — G8482 FLU IMMUNIZE ORDER/ADMIN: HCPCS | Performed by: INTERNAL MEDICINE

## 2022-03-29 PROCEDURE — 99211 OFF/OP EST MAY X REQ PHY/QHP: CPT | Performed by: INTERNAL MEDICINE

## 2022-03-29 PROCEDURE — 1036F TOBACCO NON-USER: CPT | Performed by: INTERNAL MEDICINE

## 2022-03-29 PROCEDURE — 80053 COMPREHEN METABOLIC PANEL: CPT

## 2022-03-29 PROCEDURE — 99214 OFFICE O/P EST MOD 30 MIN: CPT | Performed by: INTERNAL MEDICINE

## 2022-03-29 PROCEDURE — G8419 CALC BMI OUT NRM PARAM NOF/U: HCPCS | Performed by: INTERNAL MEDICINE

## 2022-03-29 NOTE — TELEPHONE ENCOUNTER
AVS from 3/29/22     Need monthly cbc, rv in 3 moths.  With cbc, cmp         *rv is sched for [email or web address]      PT was given AVS and an appt schedule

## 2022-03-29 NOTE — PROGRESS NOTES
DIAGNOSIS:   1. Severe thrombocytopenia, likely ITP with petechia and bruising, no active bleeding, steroid dependent  2. Hashimoto thyroiditis     CURRENT THERAPY:  1. Start steroids course (high dose dex)  07/31/2018 - good initial response  2. Transition to Rituxan, with excellent response, completed September 2018   3. Relapsed ITP in January 2022, retreatment with rituximab. BRIEF CASE HISTORY:  Stephanie Segovia is a very pleasant 64 y.o. female who is referred to us for thrombocytopenia. She recently had CBC via PCP which showed PLT count at 5. She has no other history of lab work. She notes she has petechia ecchymosis which started about a year ago and has been bruising easily over the last weeks. She was on steroids for shoulder dislocation a few moths ago and had GI distress. She reports the bruising started after that. She had hypothyroidism and takes Synthroid. She has not started new medication recently and does not take any blood thinners or pain medication. She has no history of stroke, heart attack, previous blood disorders, diabetes, or cancer. She does not drink alcohol or smoke cigarettes. Started on steroids 07/31/2018 - good initial response. She was found to be steroid dependent, plan for Rituxan. Excellent response. She had persistent bone pain and bone marrow biopsy was done and was negative. Her nephew was diagnosed with ITP 11/2019 at age 55.    she was later diagnosed with hashimoto thyroiditis. The patient was monitored and off treatment for about 3 years but in 2022, her platelets were down to 28 and she required another treatment. She was started again on Rituxan and steroids with good response. She underwent mammogram that was abnormal, Bx was done and showed inflammation, fibroadenoma and calcification. INTERIM HISTORY: The patient presents for follow up to review biopsy and further recommendations.  She has tapered off all steroids, abdominal pain and insomnia resolved. She complains of significant hair loss. Her headaches persist. Her bruise as biopsy site is healing. PAST MEDICAL HISTORY: has a past medical history of Acute ITP ([address] Atrial fibrillation ([address] Hyperthyroidism, and Wears dentures. PAST SURGICAL HISTORY: has a past surgical history that includes  section (); US BREAST BIOPSY W LOC DEVICE 1ST LESION LEFT (Left, 3/18/2022); and US BREAST BIOPSY W LOC DEVICE EACH ADDL LESION LEFT (Left, 3/18/2022). CURRENT MEDICATIONS:  has a current medication list which includes the following prescription(s): hydrochlorothiazide, omeprazole, levothyroxine, ondansetron, diclofenac sodium, wheat dextrin-calcium, vitamin d3, biotin, potassium, and homeopathic products. ALLERGIES:  is allergic to bee venom and nuts [peanut-containing drug products]. FAMILY HISTORY: Mother and nephew: colon cancer; Aunt: bladder cancer; mother, aunt, 2 nieces with Hashimoto's; nephew ITP     SOCIAL HISTORY:  reports that she has never smoked. She has never used smokeless tobacco. She reports current alcohol use. She reports that she does not use drugs. REVIEW OF SYSTEMS:   General: No fever or night sweats. Weight is stable. +fatigue improved   ENT: No double or blurred vision, no tinnitus or hearing problem, no dysphagia or sore throat   Respiratory: No chest pain, no shortness of breath, no cough or hemoptysis. Cardiovascular: Denies chest pain, PND or orthopnea. No L E swelling or palpitations. Gastrointestinal:  No vomiting, diarrhea or constipation. +intermittent abdominal pain, nausea - resolved   Genitourinary: Denies dysuria, hematuria, frequency, urgency or incontinence. Neurological: Denies decreased LOC, no sensory or motor focal deficits. +headaches -   Musculoskeletal:  No arthralgia no back pain or joint swelling. Skin: There are no rashes or bleeding. +hair loss   Psychiatric:  No anxiety, no depression.    Endocrine: No diabetes or thyroid disease. Hematologic: No bleeding, no adenopathy. +bruise at biopsy site is healig    PHYSICAL EXAM: Shows a well appearing 64y.o.-year-old female who is not in pain or distress. Vital Signs: Blood pressure (!) 161/90, pulse 63, temperature 97.7 °F (36.5 °C), temperature source Temporal, weight 163 lb (73.9 kg), not currently breastfeeding. HEENT: Normocephalic and atraumatic. Pupils are equal, round, reactive to light and accommodation. Extraocular muscles are intact. Neck: Showed no JVD, no carotid bruit . Lungs: Clear to auscultation bilaterally. Heart: Regular without any murmur. Abdomen: Soft, mild tenderness LUQ. No hepatosplenomegaly. Extremities: No point tenderness in knee or hip. Lower extremities show no edema, clubbing, or cyanosis. Breasts: Left: no mass appreciated Right: no masses, no lesions Neuro exam: intact cranial nerves bilaterally no motor or sensory deficit, gait is normal. Lymphatic: no adenopathy appreciated in the supraclavicular, axillary, cervical or inguinal area Skin: petechia and bruising; bone marrow site is well healed    REVIEW OF LABORATORY DATA:   Lab Results   Component Value Date    WBC 6.8 03/29/2022    HGB 15.2 03/29/2022    HCT 45.2 03/29/2022    MCV 94.0 03/29/2022     (L) 03/29/2022           Chemistry        Component Value Date/Time     03/29/2022 1131    K 3.1 (L) 03/29/2022 1131    CL 99 03/29/2022 1131    CO2 30 03/29/2022 1131    BUN 15 03/29/2022 1131    CREATININE 0.81 03/29/2022 1131        Component Value Date/Time    CALCIUM 8.6 03/29/2022 1131    ALKPHOS 64 03/29/2022 1131    AST 15 03/29/2022 1131    ALT 17 03/29/2022 1131    BILITOT 0.36 03/29/2022 1131          PATHOLOGY:   03/18/2022   A.  Breast, left 2:00, ultrasound-guided core biopsy:   Benign breast tissue with hyalinizing stromal fibrosis, sclerosing   adenosis, and calcifications. No definitive neoplastic process seen.      Mackenzie Aguilar, left 3:00, ultrasoundguided core biopsy: Hyalinizing fibroadenoma with calcifications. REVIEW OF RADIOLOGICAL RESULTS:     IMPRESSION:   1. ITP, steroid refractory  2. Started 4 day course of steroids - good initial response. 3. Steroid dependent, plan to transition to Rituxan - excellent response  4. Persistent bone pain, bone marrow was done, negative, ITP active but controlled  5. Hashimoto thyroiditis, on synthroid   6. Left breast mass seen on CT scan, biopsy negative     PLAN:   1. We discussed her biopsy, negative. 2. Her lab work was reviewed PLT above 100.   3. She has tapered off the steroids and we discussed ongoing surveillance and possible treatment with recurrence. 4. I feel her hair loss is stress related as it is not known effect of her recent treatment. 5. I offered medication for headaches, she declined. 6. Return in 3 months. Scribe Attestation   This note was created by Trisha Garcia acting as scribe for the physician signing this note  Electronically Signed  Sue Ann, 3/29/2022  Scribe, [employer]. Attending Attestation   Note was reviewed and edited.   I am in agreement with the note as entered    Rubio Anthony MD  Hematologist/Medical [id number] [facility] [employer] hematology oncology physicians

## 2022-03-30 RX ORDER — HYDROCHLOROTHIAZIDE 25 MG/1
25 TABLET ORAL DAILY
Qty: 90 TABLET | Refills: 1 | Status: SHIPPED | OUTPATIENT
Start: 2022-03-30 | End: 2022-07-25 | Stop reason: SDUPTHER

## 2022-03-30 NOTE — TELEPHONE ENCOUNTER
Last visit: 05/11/21  Last Med refill: 02/03/22  Does patient have enough medication for 72 hours: Yes    Next Visit Date:  Future Appointments   Date Time Provider Leon Light   6/21/2022 10:45 AM MD Yang Ag Maintenance   Topic Date Due    COVID-19 Vaccine (1) Never done    Cervical cancer screen  Never done    Colorectal Cancer Screen  07/30/2019    Shingles Vaccine (1 of 2) 05/11/2022 (Originally 8/12/2015)    Depression Screen  05/11/2022    TSH testing  07/29/2022    Breast cancer screen  03/18/2023    Potassium monitoring  03/29/2023    Creatinine monitoring  03/29/2023    DTaP/Tdap/Td vaccine (2 - Td or Tdap) 10/31/2023    Lipid screen  02/28/2025    Flu vaccine  Completed    Hepatitis C screen  Completed    HIV screen  Completed    Hepatitis A vaccine  Aged Out    Hepatitis B vaccine  Aged Out    Hib vaccine  Aged Out    Meningococcal (ACWY) vaccine  Aged Out    Pneumococcal 0-64 years Vaccine  Aged Out       No results found for: LABA1C          ( goal A1C is < 7)   No results found for: LABMICR  LDL Cholesterol (mg/dL)   Date Value   02/29/2020 120   07/30/2018 142 (H)       (goal LDL is <100)   AST (U/L)   Date Value   03/29/2022 15     ALT (U/L)   Date Value   03/29/2022 17     BUN (mg/dL)   Date Value   03/29/2022 15     BP Readings from Last 3 Encounters:   03/29/22 (!) 161/90   03/18/22 (!) 162/90   03/15/22 (!) 162/93          (goal 120/80)    All Future Testing planned in [facility]  Lab Frequency Next Occurrence   Comprehensive Metabolic Panel, Fasting Once 05/11/2022   HENRIQUE DIGITAL SCREEN W OR WO CAD BILATERAL Once 07/11/2022   CBC with Auto Differential     Comprehensive Metabolic Panel     CBC with Auto Differential     Comprehensive Metabolic Panel                 Patient Active Problem List:     Chronic non-seasonal allergic rhinitis     Wears dentures     Family history of breast cancer in sister     Abnormal bruising Acute ITP (HCC)     Thyroid enlargement     History of ITP     Mixed hyperlipidemia     Essential hypertension     Personal history of diseases of the blood and blood-forming organs and certain disorders involving the immune mechanism     Hypothyroidism due to Hashimoto's thyroiditis     Fatigue

## 2022-04-25 ENCOUNTER — OFFICE VISIT (OUTPATIENT)
Dept: FAMILY MEDICINE CLINIC | Age: 57
End: 2022-04-25
Payer: MEDICARE

## 2022-04-25 VITALS
TEMPERATURE: 97.5 F | OXYGEN SATURATION: 98 % | WEIGHT: 164.2 LBS | SYSTOLIC BLOOD PRESSURE: 124 MMHG | BODY MASS INDEX: 26.5 KG/M2 | RESPIRATION RATE: 18 BRPM | HEART RATE: 78 BPM | DIASTOLIC BLOOD PRESSURE: 80 MMHG

## 2022-04-25 DIAGNOSIS — M79.672 LEFT FOOT PAIN: ICD-10-CM

## 2022-04-25 DIAGNOSIS — M25.559 HIP PAIN: ICD-10-CM

## 2022-04-25 DIAGNOSIS — I10 ESSENTIAL HYPERTENSION: ICD-10-CM

## 2022-04-25 DIAGNOSIS — G44.211 INTRACTABLE EPISODIC TENSION-TYPE HEADACHE: ICD-10-CM

## 2022-04-25 DIAGNOSIS — Z12.12 SCREENING FOR COLORECTAL CANCER: ICD-10-CM

## 2022-04-25 DIAGNOSIS — Z00.00 ANNUAL PHYSICAL EXAM: Primary | ICD-10-CM

## 2022-04-25 DIAGNOSIS — E78.2 MIXED HYPERLIPIDEMIA: ICD-10-CM

## 2022-04-25 DIAGNOSIS — L65.9 HAIR LOSS: ICD-10-CM

## 2022-04-25 DIAGNOSIS — Z12.11 SCREENING FOR COLORECTAL CANCER: ICD-10-CM

## 2022-04-25 DIAGNOSIS — D69.3 ACUTE ITP (HCC): ICD-10-CM

## 2022-04-25 DIAGNOSIS — R73.9 ELEVATED BLOOD SUGAR LEVEL: ICD-10-CM

## 2022-04-25 DIAGNOSIS — E03.8 HYPOTHYROIDISM DUE TO HASHIMOTO'S THYROIDITIS: ICD-10-CM

## 2022-04-25 DIAGNOSIS — Z28.21 COVID-19 VACCINATION REFUSED: ICD-10-CM

## 2022-04-25 DIAGNOSIS — E06.3 HYPOTHYROIDISM DUE TO HASHIMOTO'S THYROIDITIS: ICD-10-CM

## 2022-04-25 DIAGNOSIS — F41.9 ANXIETY: ICD-10-CM

## 2022-04-25 PROCEDURE — 99396 PREV VISIT EST AGE 40-64: CPT | Performed by: NURSE PRACTITIONER

## 2022-04-25 RX ORDER — PNV NO.95/FERROUS FUM/FOLIC AC 28MG-0.8MG
TABLET ORAL DAILY
COMMUNITY

## 2022-04-25 ASSESSMENT — ENCOUNTER SYMPTOMS
BLOOD IN STOOL: 0
SHORTNESS OF BREATH: 0
CONSTIPATION: 0
ABDOMINAL DISTENTION: 0
DIARRHEA: 0
ABDOMINAL PAIN: 0
BACK PAIN: 0
NAUSEA: 1
CHEST TIGHTNESS: 0
COUGH: 0

## 2022-04-25 NOTE — PROGRESS NOTES
History and Physical    [address] [address]  [address]  YOB: 1965    Date of Service:  4/25/2022    Chief Complaint:   Ami Parra is a 64 y.o. female who presents for complete physical examination. Visit Summary:  Patient presents for follow up   Reviewed health maintenance and any recent labs/imaging     3/29/2022 Dr Susu Jaimes:  Chuckie Virgen is a very pleasant 64 y.o. female who is referred to us for thrombocytopenia. She recently had CBC via PCP which showed PLT count at 5. She has no other history of lab work. She notes she has petechia ecchymosis which started about a year ago and has been bruising easily over the last weeks. She was on steroids for shoulder dislocation a few moths ago and had GI distress. She reports the bruising started after that. She had hypothyroidism and takes Synthroid. She has not started new medication recently and does not take any blood thinners or pain medication. She has no history of stroke, heart attack, previous blood disorders, diabetes, or cancer. She does not drink alcohol or smoke cigarettes. Started on steroids 07/31/2018 - good initial response. She was found to be steroid dependent, plan for Rituxan. Excellent response. She had persistent bone pain and bone marrow biopsy was done and was negative. Her nephew was diagnosed with ITP 11/2019 at age 55.    she was later diagnosed with hashimoto thyroiditis. The patient was monitored and off treatment for about 3 years but in 2022, her platelets were down to 28 and she required another treatment. She was started again on Rituxan and steroids with good response. She underwent mammogram that was abnormal, Bx was done and showed inflammation, fibroadenoma and calcification.      Wt Readings from Last 3 Encounters:   04/25/22 164 lb 3.2 oz (74.5 kg)   03/29/22 163 lb (73.9 kg)   03/15/22 159 lb 9.6 oz (72.4 kg)     BP Readings from Last 3 Encounters:   04/25/22 124/80   03/29/22 (!) 161/90   03/18/22 (!) 162/90       Patient Active Problem List   Diagnosis    Chronic non-seasonal allergic rhinitis    Wears dentures    Family history of breast cancer in sister   [place] Abnormal bruising    Acute ITP ([facility] [address]    Thyroid enlargement    History of ITP    Mixed hyperlipidemia    Essential hypertension    Personal history of diseases of the blood and blood-forming organs and certain disorders involving the immune mechanism    Hypothyroidism due to Hashimoto's thyroiditis    Fatigue       Preventive Care:  Health Maintenance   Topic Date Due    Cervical cancer screen  Never done    Shingles vaccine (1 of 2) Never done    Colorectal Cancer Screen  07/30/2019    Depression Screen  05/11/2022    COVID-19 Vaccine (1) 04/03/2023 (Originally 8/12/1970)    Breast cancer screen  03/18/2023    DTaP/Tdap/Td vaccine (2 - Td or Tdap) 10/31/2023    Lipids  04/26/2027    Flu vaccine  Completed    Hepatitis C screen  Completed    HIV screen  Completed    Hepatitis A vaccine  Aged Out    Hepatitis B vaccine  Aged Out    Hib vaccine  Aged Out    Meningococcal (ACWY) vaccine  Aged Out    Pneumococcal 0-64 years Vaccine  Aged Out        Hx abnormal PAP: no  Self-breast exams:  yes  Previous mammogram:  yes  Previous DEXA scan: no  Eye exam current: yes  Dental exam current: yes  Exercise routinely: no  Seatbelt use: Yes  Lipid panel:   Lab Results   Component Value Date    CHOL 229 (H) 04/26/2022    TRIG 109 04/26/2022    HDL 61 04/26/2022        Immunization History   Administered Date(s) Administered    Influenza, MDCK Quadv, IM, PF (Flucelvax 2 yrs and older) 11/08/2021    Influenza, Quadv, IM, PF (6 mo and older Fluzone, Flulaval, Fluarix, and 3 yrs and older Afluria) 10/23/2018    Influenza, Quadv, Recombinant, IM PF (Flublok 18 yrs and older) 10/25/2019, 11/03/2020    Tdap (Boostrix, Adacel) 10/31/2013       Allergies   Allergen Reactions    Bee Venom Hives    Nuts [Peanut-Containing Drug Products] Hives     Outpatient Medications Marked as Taking for the 22 encounter (Office Visit) with Hector GasparJAYLENE CNP   Medication Sig Dispense Refill    zinc 50 MG CAPS Take 1 capsule by mouth daily      Ferrous Sulfate (IRON) 325 (65 Fe) MG TABS Take by mouth daily      hydroCHLOROthiazide (HYDRODIURIL) 25 MG tablet Take 1 tablet by mouth daily 90 tablet 1    omeprazole (PRILOSEC) 20 MG delayed release capsule Take 1 capsule by mouth Daily 30 capsule 3    levothyroxine (SYNTHROID) 75 MCG tablet TAKE ONE TABLET BY MOUTH DAILY 90 tablet 1    Wheat Dextrin-Calcium (BENEFIBER PLUS CALCIUM PO) Take by mouth daily      [DISCONTINUED] Cholecalciferol (VITAMIN D3) 2000 units CAPS Take 1 capsule by mouth daily      Biotin 5000 MCG CAPS Take 1 capsule by mouth      Homeopathic Products (CLEARLIFE ALLERGY RELIEF NA) by Nasal route Indications: OTC allergy medication      [DISCONTINUED] Potassium 99 MG TABS Take by mouth         Past Medical History:   Diagnosis Date    Acute [facility]P ([facility] [address] 2018    Atrial fibrillation (HCC)     Hyperthyroidism     Wears dentures      Past Surgical History:   Procedure Laterality Date     SECTION      US BREAST BIOPSY NEEDLE ADDITIONAL LEFT Left 3/18/2022    US BREAST BIOPSY NEEDLE ADDITIONAL LEFT 3/18/2022 [facility]    US BREAST NEEDLE BIOPSY LEFT Left 3/18/2022    US BREAST NEEDLE BIOPSY LEFT 3/18/2022 [facility]     Family History   Problem Relation Age of Onset    Hypertension Mother     Thyroid Disease Mother     Colon Cancer Mother 79    Diabetes Father     Heart Attack Father     Breast Cancer Sister 61    High Blood Pressure Brother     Heart Disease Brother     Cancer Brother         liver cancer     Other Brother         MVA    Arthritis Brother     Depression Brother     Alcohol Abuse Brother     Colon Cancer Other 44        nephew - her brother's son     Social History     Socioeconomic History    Marital status: Single     Spouse name: Not on file    Number of children: Not on file    Years of education: Not on file    Highest education level: Not on file   Occupational History    Not on file   Tobacco Use    Smoking status: Never Smoker    Smokeless tobacco: Never Used   Vaping Use    Vaping Use: Never used   Substance and Sexual Activity    Alcohol use: Yes     Comment: rare     Drug use: No    Sexual activity: Yes     Partners: Male     Birth control/protection: Condom, Post-menopausal   Other Topics Concern    Not on file   Social History Narrative    Not on file     Social Determinants of Health     Financial Resource Strain:     Difficulty of Paying Living Expenses: Not on file   Food Insecurity:     Worried About Running Out of Food in the Last Year: Not on file    Marcela of Food in the Last Year: Not on file   Transportation Needs:     Lack of Transportation (Medical): Not on file    Lack of Transportation (Non-Medical):  Not on file   Physical Activity:     Days of Exercise per Week: Not on file    Minutes of Exercise per Session: Not on file   Stress:     Feeling of Stress : Not on file   Social Connections:     Frequency of Communication with Friends and Family: Not on file    Frequency of Social Gatherings with Friends and Family: Not on file    Attends [employer] Services: Not on file    Active Member of [address] [place] [address] or Organizations: Not on file    Attends Club or Organization Meetings: Not on file    Marital Status: Not on file   Intimate Partner Violence:     Fear of Current or Ex-Partner: Not on file    Emotionally Abused: Not on file    Physically Abused: Not on file    Sexually Abused: Not on file   Housing Stability:     Unable to Pay for Housing in the Last Year: Not on file    Number of [place] in the Last Year: Not on file    Unstable Housing in the Last Year: Not on file Patient Care Team:  Eugune Dancer, APRN - CNP as PCP - General (Nurse Practitioner Family)  Eugune Dancer, APRN - CNP as PCP - [facility]  Joey La MD as Consulting Physician (Endocrinology)  Andrea Cooper MD as Consulting Physician (Hematology and Oncology)    Review of Systems:  Review of Systems   Constitutional: Positive for fatigue. Negative for activity change, appetite change (poor), chills and fever. HENT:        Upper and lower dentures - a bit big? Eyes:        Routine eye exam - reading glasses    Respiratory: Negative for cough, chest tightness and shortness of breath. Cardiovascular: Negative for chest pain, palpitations and leg swelling. Gastrointestinal: Positive for nausea (after iron in mornings). Negative for abdominal distention, abdominal pain, blood in stool, constipation and diarrhea. Genitourinary: Negative for difficulty urinating, dysuria, frequency, pelvic pain and urgency. Postmenopausal    Musculoskeletal: Positive for arthralgias (left hip ). Negative for back pain, gait problem, joint swelling and myalgias. Outer left foot pain? 1 month    Neurological: Positive for numbness (bilateral hands, at night ) and headaches (almost daily, no OTC medications. ). Negative for dizziness and light-headedness. Had dizziness with infusions    Psychiatric/Behavioral: Negative for agitation, decreased concentration, dysphoric mood, self-injury, sleep disturbance and suicidal ideas. The patient is not nervous/anxious. Physical Exam:   Vitals:    04/25/22 0726   BP: 124/80   Site: Left Upper Arm   Position: Sitting   Cuff Size: Medium Adult   Pulse: 78   Resp: 18   Temp: 97.5 °F (36.4 °C)   TempSrc: Temporal   SpO2: 98%   Weight: 164 lb 3.2 oz (74.5 kg)     Body mass index is 26.5 kg/m². Physical Exam  Vitals and nursing note reviewed. Constitutional:       General: She is not in acute distress.      Appearance: Normal appearance. She is well-developed and well-groomed. She is not ill-appearing or toxic-appearing. HENT:      Head: Normocephalic. Right Ear: Tympanic membrane, ear canal and external ear normal. No middle ear effusion. There is no impacted cerumen. Tympanic membrane is not erythematous, retracted or bulging. Left Ear: Tympanic membrane, ear canal and external ear normal.  No middle ear effusion. There is no impacted cerumen. Tympanic membrane is not erythematous, retracted or bulging. Nose: Nose normal. No mucosal edema, congestion or rhinorrhea. Right Turbinates: Not enlarged or swollen. Left Turbinates: Not enlarged or swollen. Right Sinus: No maxillary sinus tenderness or frontal sinus tenderness. Left Sinus: No maxillary sinus tenderness or frontal sinus tenderness. Mouth/Throat:      Lips: Pink. Mouth: Mucous membranes are moist.      Dentition: Normal dentition. No dental caries. Pharynx: Oropharynx is clear. No oropharyngeal exudate, posterior oropharyngeal erythema or uvula swelling. Eyes:      General: Lids are normal. Vision grossly intact. Cardiovascular:      Rate and Rhythm: Normal rate and regular rhythm. No extrasystoles are present. Heart sounds: Normal heart sounds, S1 normal and S2 normal. No murmur heard. Pulmonary:      Effort: Pulmonary effort is normal. No accessory muscle usage, prolonged expiration or respiratory distress. Breath sounds: Normal breath sounds and air entry. No wheezing, rhonchi or rales. Abdominal:      General: There is no distension. Palpations: Abdomen is soft. Tenderness: There is no abdominal tenderness. Musculoskeletal:      Cervical back: No torticollis. No pain with movement. Normal range of motion. Right lower leg: No edema. Left lower leg: No edema. Lymphadenopathy:      Cervical: No cervical adenopathy. Skin:     General: Skin is warm and dry.       Coloration: Skin is not ashen, cyanotic, jaundiced or pale. Neurological:      General: No focal deficit present. Mental Status: She is alert and oriented to person, place, and time. Motor: Motor function is intact. Gait: Gait is intact. Psychiatric:         Attention and Perception: Attention and perception normal.         Mood and Affect: Mood and affect normal.         Speech: Speech normal.         Behavior: Behavior normal. Behavior is cooperative. Thought Content: Thought content normal. Thought content does not include suicidal ideation. Thought content does not include suicidal plan. Cognition and Memory: Cognition and memory normal.         Judgment: Judgment normal.         Assessment/Plan:  1. Annual physical exam  2. Anxiety  -     Vitamin D 25 Hydroxy; Future  3. Hair loss  -     DHEA-Sulfate; Future  -     Phosphorus; Future  -     PTH, Intact; Future  4. Intractable episodic tension-type headache  -     Magnesium; Future  5. Essential hypertension  -     Phosphorus; Future  -     PTH, Intact; Future  6. Mixed hyperlipidemia  -     Lipid Panel; Future  7. Left foot pain  -     Uric Acid; Future  -     Sedimentation Rate; Future  -     C-Reactive Protein; Future  -     XR FOOT LEFT (MIN 3 VIEWS); Future  8. Hip pain  -     XR HIP 2-3 VW W PELVIS LEFT; Future  9. Hypothyroidism due to Hashimoto's thyroiditis  -     FREE T4; Future  -     T3, Free; Future  -     Thyroid Antibodies; Future  -     TSH; Future  10. Acute ITP (HCC)  -     Vitamin B12 & Folate; Future  11. Elevated blood sugar level  -     Hemoglobin A1C; Future  -     Insulin, total; Future  -     Urinalysis with Reflex to Culture; Future  12. COVID-19 vaccination refused  13. Screening for colorectal cancer  -     POCT Fecal Immunochemical Test (FIT); Future     Check labs, rationale for hair loss other than anxiety? Stress?      Electronically signed by JAYLENE Johnson CNP, APRN-CNP on 5/16/2022 at 10:01 AM    Please note that this chart was generated using voice recognition Dragon dictation software. Although every effort was made to ensure the accuracy of this automated transcription, some errors in transcription may have occurred.

## 2022-04-26 ENCOUNTER — HOSPITAL ENCOUNTER (OUTPATIENT)
Dept: GENERAL RADIOLOGY | Age: 57
Discharge: HOME | End: 2022-04-28
Payer: MEDICARE

## 2022-04-26 ENCOUNTER — HOSPITAL ENCOUNTER (OUTPATIENT)
Age: 57
Discharge: HOME | End: 2022-04-28
Payer: MEDICARE

## 2022-04-26 ENCOUNTER — HOSPITAL ENCOUNTER (OUTPATIENT)
Age: 57
Discharge: HOME | End: 2022-04-26
Payer: MEDICARE

## 2022-04-26 DIAGNOSIS — I10 ESSENTIAL HYPERTENSION: ICD-10-CM

## 2022-04-26 DIAGNOSIS — G44.211 INTRACTABLE EPISODIC TENSION-TYPE HEADACHE: ICD-10-CM

## 2022-04-26 DIAGNOSIS — M79.672 LEFT FOOT PAIN: ICD-10-CM

## 2022-04-26 DIAGNOSIS — E06.3 HYPOTHYROIDISM DUE TO HASHIMOTO'S THYROIDITIS: ICD-10-CM

## 2022-04-26 DIAGNOSIS — R73.9 ELEVATED BLOOD SUGAR LEVEL: ICD-10-CM

## 2022-04-26 DIAGNOSIS — E78.2 MIXED HYPERLIPIDEMIA: ICD-10-CM

## 2022-04-26 DIAGNOSIS — F41.9 ANXIETY: ICD-10-CM

## 2022-04-26 DIAGNOSIS — D69.3 ACUTE ITP (HCC): ICD-10-CM

## 2022-04-26 DIAGNOSIS — L65.9 HAIR LOSS: ICD-10-CM

## 2022-04-26 DIAGNOSIS — E03.8 HYPOTHYROIDISM DUE TO HASHIMOTO'S THYROIDITIS: ICD-10-CM

## 2022-04-26 DIAGNOSIS — M25.559 HIP PAIN: ICD-10-CM

## 2022-04-26 LAB
BILIRUBIN URINE: NEGATIVE
C-REACTIVE PROTEIN: 4.4 MG/L (ref 0–5)
CHOLESTEROL/HDL RATIO: 3.8
CHOLESTEROL: 229 MG/DL
COLOR: YELLOW
COMMENT UA: NORMAL
ESTIMATED AVERAGE GLUCOSE: 103 MG/DL
FOLATE: 8.1 NG/ML
GLUCOSE URINE: NEGATIVE
HBA1C MFR BLD: 5.2 % (ref 4–6)
HDLC SERPL-MCNC: 61 MG/DL
INSULIN COMMENT: NORMAL
INSULIN REFERENCE RANGE:: NORMAL
INSULIN: 19.2 MU/L
KETONES, URINE: NEGATIVE
LDL CHOLESTEROL: 146 MG/DL (ref 0–130)
LEUKOCYTE ESTERASE, URINE: NEGATIVE
MAGNESIUM: 2 MG/DL (ref 1.6–2.6)
NITRITE, URINE: NEGATIVE
PH UA: 6.5 (ref 5–8)
PHOSPHORUS: 3.4 MG/DL (ref 2.6–4.5)
PROTEIN UA: NEGATIVE
PTH INTACT: 41.98 PG/ML (ref 15–65)
SEDIMENTATION RATE, ERYTHROCYTE: 18 MM/HR (ref 0–30)
SPECIFIC GRAVITY UA: 1.02 (ref 1–1.03)
T3 FREE: 3.05 PG/ML (ref 2.02–4.43)
THYROXINE, FREE: 1.54 NG/DL (ref 0.93–1.7)
TRIGL SERPL-MCNC: 109 MG/DL
TSH SERPL DL<=0.05 MIU/L-ACNC: 2.95 UIU/ML (ref 0.3–5)
TURBIDITY: CLEAR
URIC ACID: 5.9 MG/DL (ref 2.4–5.7)
URINE HGB: NEGATIVE
UROBILINOGEN, URINE: NORMAL
VITAMIN B-12: 307 PG/ML (ref 232–1245)
VITAMIN D 25-HYDROXY: 34 NG/ML

## 2022-04-26 PROCEDURE — 82627 DEHYDROEPIANDROSTERONE: CPT

## 2022-04-26 PROCEDURE — 86376 MICROSOMAL ANTIBODY EACH: CPT

## 2022-04-26 PROCEDURE — 83525 ASSAY OF INSULIN: CPT

## 2022-04-26 PROCEDURE — 73502 X-RAY EXAM HIP UNI 2-3 VIEWS: CPT

## 2022-04-26 PROCEDURE — 85652 RBC SED RATE AUTOMATED: CPT

## 2022-04-26 PROCEDURE — 83036 HEMOGLOBIN GLYCOSYLATED A1C: CPT

## 2022-04-26 PROCEDURE — 84481 FREE ASSAY (FT-3): CPT

## 2022-04-26 PROCEDURE — 83735 ASSAY OF MAGNESIUM: CPT

## 2022-04-26 PROCEDURE — 82746 ASSAY OF FOLIC ACID SERUM: CPT

## 2022-04-26 PROCEDURE — 73630 X-RAY EXAM OF FOOT: CPT

## 2022-04-26 PROCEDURE — 86140 C-REACTIVE PROTEIN: CPT

## 2022-04-26 PROCEDURE — 82607 VITAMIN B-12: CPT

## 2022-04-26 PROCEDURE — 84439 ASSAY OF FREE THYROXINE: CPT

## 2022-04-26 PROCEDURE — 81003 URINALYSIS AUTO W/O SCOPE: CPT

## 2022-04-26 PROCEDURE — 84550 ASSAY OF BLOOD/URIC ACID: CPT

## 2022-04-26 PROCEDURE — 36415 COLL VENOUS BLD VENIPUNCTURE: CPT

## 2022-04-26 PROCEDURE — 84100 ASSAY OF PHOSPHORUS: CPT

## 2022-04-26 PROCEDURE — 84443 ASSAY THYROID STIM HORMONE: CPT

## 2022-04-26 PROCEDURE — 80061 LIPID PANEL: CPT

## 2022-04-26 PROCEDURE — 82306 VITAMIN D 25 HYDROXY: CPT

## 2022-04-26 PROCEDURE — 86800 THYROGLOBULIN ANTIBODY: CPT

## 2022-04-26 PROCEDURE — 83970 ASSAY OF PARATHORMONE: CPT

## 2022-04-27 LAB — DHEAS (DHEA SULFATE): <15 UG/DL (ref 26–200)

## 2022-04-28 ENCOUNTER — HOSPITAL ENCOUNTER (OUTPATIENT)
Age: 57
Discharge: HOME | End: 2022-04-28
Payer: MEDICARE

## 2022-04-28 DIAGNOSIS — L65.9 HAIR LOSS: ICD-10-CM

## 2022-04-28 DIAGNOSIS — E87.6 HYPOKALEMIA: Primary | ICD-10-CM

## 2022-04-28 DIAGNOSIS — D69.3 ACUTE ITP (HCC): ICD-10-CM

## 2022-04-28 LAB
ABSOLUTE EOS #: 0.2 K/UL (ref 0–0.4)
ABSOLUTE LYMPH #: 1.5 K/UL (ref 1–4.8)
ABSOLUTE MONO #: 0.6 K/UL (ref 0.1–1.2)
ALBUMIN SERPL-MCNC: 4.5 G/DL (ref 3.5–5.2)
ALBUMIN/GLOBULIN RATIO: 1.8 (ref 1–2.5)
ALP BLD-CCNC: 78 U/L (ref 35–104)
ALT SERPL-CCNC: 14 U/L (ref 5–33)
ANION GAP SERPL CALCULATED.3IONS-SCNC: 10 MMOL/L (ref 9–17)
AST SERPL-CCNC: 16 U/L
BASOPHILS # BLD: 1 % (ref 0–2)
BASOPHILS ABSOLUTE: 0 K/UL (ref 0–0.2)
BILIRUB SERPL-MCNC: 0.53 MG/DL (ref 0.3–1.2)
BUN BLDV-MCNC: 18 MG/DL (ref 6–20)
CALCIUM SERPL-MCNC: 9.7 MG/DL (ref 8.6–10.4)
CHLORIDE BLD-SCNC: 100 MMOL/L (ref 98–107)
CO2: 29 MMOL/L (ref 20–31)
CREAT SERPL-MCNC: 0.75 MG/DL (ref 0.5–0.9)
EOSINOPHILS RELATIVE PERCENT: 3 % (ref 1–4)
GFR AFRICAN AMERICAN: >60 ML/MIN
GFR NON-AFRICAN AMERICAN: >60 ML/MIN
GFR SERPL CREATININE-BSD FRML MDRD: ABNORMAL ML/MIN/{1.73_M2}
GLUCOSE BLD-MCNC: 77 MG/DL (ref 70–99)
HCT VFR BLD CALC: 44 % (ref 36–46)
HEMOGLOBIN: 14.9 G/DL (ref 12–16)
LYMPHOCYTES # BLD: 26 % (ref 24–44)
MCH RBC QN AUTO: 31.1 PG (ref 26–34)
MCHC RBC AUTO-ENTMCNC: 34 G/DL (ref 31–37)
MCV RBC AUTO: 91.4 FL (ref 80–100)
MONOCYTES # BLD: 10 % (ref 2–11)
PDW BLD-RTO: 13.6 % (ref 12.5–15.4)
PLATELET # BLD: 54 K/UL (ref 140–450)
PMV BLD AUTO: 8.6 FL (ref 6–12)
POTASSIUM SERPL-SCNC: 3.5 MMOL/L (ref 3.7–5.3)
RBC # BLD: 4.81 M/UL (ref 4–5.2)
SEG NEUTROPHILS: 60 % (ref 36–66)
SEGMENTED NEUTROPHILS ABSOLUTE COUNT: 3.4 K/UL (ref 1.8–7.7)
SODIUM BLD-SCNC: 139 MMOL/L (ref 135–144)
THYROGLOBULIN AB: 29 IU/ML (ref 0–40)
THYROID PEROXIDASE (TPO) AB: 246 IU/ML (ref 0–25)
TOTAL PROTEIN: 7 G/DL (ref 6.4–8.3)
WBC # BLD: 5.6 K/UL (ref 3.5–11)

## 2022-04-28 PROCEDURE — 36415 COLL VENOUS BLD VENIPUNCTURE: CPT

## 2022-04-28 PROCEDURE — 85025 COMPLETE CBC W/AUTO DIFF WBC: CPT

## 2022-04-28 PROCEDURE — 80053 COMPREHEN METABOLIC PANEL: CPT

## 2022-04-28 RX ORDER — POTASSIUM CHLORIDE 750 MG/1
10 CAPSULE, EXTENDED RELEASE ORAL DAILY
Qty: 90 CAPSULE | Refills: 0 | Status: SHIPPED | OUTPATIENT
Start: 2022-04-28 | End: 2022-07-31 | Stop reason: SDUPTHER

## 2022-05-16 ASSESSMENT — VISUAL ACUITY: OU: 1

## 2022-05-27 ENCOUNTER — HOSPITAL ENCOUNTER (OUTPATIENT)
Age: 57
Discharge: HOME | End: 2022-05-27
Payer: MEDICARE

## 2022-05-27 DIAGNOSIS — D69.3 ACUTE ITP (HCC): ICD-10-CM

## 2022-05-27 LAB
ABSOLUTE EOS #: 0.2 K/UL (ref 0–0.4)
ABSOLUTE LYMPH #: 1.3 K/UL (ref 1–4.8)
ABSOLUTE MONO #: 0.6 K/UL (ref 0.1–1.2)
ALBUMIN SERPL-MCNC: 4.8 G/DL (ref 3.5–5.2)
ALBUMIN/GLOBULIN RATIO: 2 (ref 1–2.5)
ALP BLD-CCNC: 79 U/L (ref 35–104)
ALT SERPL-CCNC: 15 U/L (ref 5–33)
ANION GAP SERPL CALCULATED.3IONS-SCNC: 11 MMOL/L (ref 9–17)
AST SERPL-CCNC: 17 U/L
BASOPHILS # BLD: 1 % (ref 0–2)
BASOPHILS ABSOLUTE: 0 K/UL (ref 0–0.2)
BILIRUB SERPL-MCNC: 0.59 MG/DL (ref 0.3–1.2)
BUN BLDV-MCNC: 18 MG/DL (ref 6–20)
CALCIUM SERPL-MCNC: 10.1 MG/DL (ref 8.6–10.4)
CHLORIDE BLD-SCNC: 101 MMOL/L (ref 98–107)
CO2: 28 MMOL/L (ref 20–31)
CREAT SERPL-MCNC: 0.82 MG/DL (ref 0.5–0.9)
EOSINOPHILS RELATIVE PERCENT: 5 % (ref 1–4)
GFR AFRICAN AMERICAN: >60 ML/MIN
GFR NON-AFRICAN AMERICAN: >60 ML/MIN
GFR SERPL CREATININE-BSD FRML MDRD: ABNORMAL ML/MIN/{1.73_M2}
GLUCOSE BLD-MCNC: 65 MG/DL (ref 70–99)
HCT VFR BLD CALC: 45.5 % (ref 36–46)
HEMOGLOBIN: 15.3 G/DL (ref 12–16)
LYMPHOCYTES # BLD: 27 % (ref 24–44)
MCH RBC QN AUTO: 30.6 PG (ref 26–34)
MCHC RBC AUTO-ENTMCNC: 33.7 G/DL (ref 31–37)
MCV RBC AUTO: 90.6 FL (ref 80–100)
MONOCYTES # BLD: 12 % (ref 2–11)
PDW BLD-RTO: 14.1 % (ref 12.5–15.4)
PLATELET # BLD: 71 K/UL (ref 140–450)
PMV BLD AUTO: 8.4 FL (ref 6–12)
POTASSIUM SERPL-SCNC: 3.7 MMOL/L (ref 3.7–5.3)
RBC # BLD: 5.02 M/UL (ref 4–5.2)
SEG NEUTROPHILS: 55 % (ref 36–66)
SEGMENTED NEUTROPHILS ABSOLUTE COUNT: 2.7 K/UL (ref 1.8–7.7)
SODIUM BLD-SCNC: 140 MMOL/L (ref 135–144)
TOTAL PROTEIN: 7.2 G/DL (ref 6.4–8.3)
WBC # BLD: 4.9 K/UL (ref 3.5–11)

## 2022-05-27 PROCEDURE — 80053 COMPREHEN METABOLIC PANEL: CPT

## 2022-05-27 PROCEDURE — 85025 COMPLETE CBC W/AUTO DIFF WBC: CPT

## 2022-05-27 PROCEDURE — 36415 COLL VENOUS BLD VENIPUNCTURE: CPT

## 2022-06-01 NOTE — RESULT ENCOUNTER NOTE
Reviewed. Ordered (to be addressed/treated) per ordering provider. Available if needed for correlation of care.

## 2022-06-03 DIAGNOSIS — Z86.2 HISTORY OF ITP: ICD-10-CM

## 2022-06-03 RX ORDER — OMEPRAZOLE 20 MG/1
20 CAPSULE, DELAYED RELEASE ORAL DAILY
Qty: 30 CAPSULE | Refills: 3 | Status: SHIPPED | OUTPATIENT
Start: 2022-06-03 | End: 2022-10-22 | Stop reason: SDUPTHER

## 2022-06-13 ENCOUNTER — PATIENT MESSAGE (OUTPATIENT)
Dept: FAMILY MEDICINE CLINIC | Age: 57
End: 2022-06-13

## 2022-06-13 DIAGNOSIS — D69.3 ACUTE ITP (HCC): Primary | ICD-10-CM

## 2022-06-21 ENCOUNTER — OFFICE VISIT (OUTPATIENT)
Dept: ONCOLOGY | Age: 57
End: 2022-06-21
Payer: MEDICARE

## 2022-06-21 ENCOUNTER — HOSPITAL ENCOUNTER (OUTPATIENT)
Age: 57
Discharge: HOME | End: 2022-06-21
Payer: MEDICARE

## 2022-06-21 ENCOUNTER — TELEPHONE (OUTPATIENT)
Dept: ONCOLOGY | Age: 57
End: 2022-06-21

## 2022-06-21 VITALS
DIASTOLIC BLOOD PRESSURE: 92 MMHG | BODY MASS INDEX: 26.34 KG/M2 | WEIGHT: 163.2 LBS | RESPIRATION RATE: 18 BRPM | SYSTOLIC BLOOD PRESSURE: 155 MMHG | TEMPERATURE: 98.4 F | HEART RATE: 79 BPM

## 2022-06-21 DIAGNOSIS — D69.3 ACUTE ITP (HCC): ICD-10-CM

## 2022-06-21 DIAGNOSIS — D69.3 CHRONIC ITP (IDIOPATHIC THROMBOCYTOPENIA) (HCC): Primary | ICD-10-CM

## 2022-06-21 LAB
ABSOLUTE EOS #: 0.2 K/UL (ref 0–0.4)
ABSOLUTE LYMPH #: 1.4 K/UL (ref 1–4.8)
ABSOLUTE MONO #: 0.5 K/UL (ref 0.1–1.2)
ALBUMIN SERPL-MCNC: 5 G/DL (ref 3.5–5.2)
ALBUMIN/GLOBULIN RATIO: 2.3 (ref 1–2.5)
ALP BLD-CCNC: 78 U/L (ref 35–104)
ALT SERPL-CCNC: 15 U/L (ref 5–33)
ANION GAP SERPL CALCULATED.3IONS-SCNC: 9 MMOL/L (ref 9–17)
AST SERPL-CCNC: 19 U/L
BASOPHILS # BLD: 1 % (ref 0–2)
BASOPHILS ABSOLUTE: 0 K/UL (ref 0–0.2)
BILIRUB SERPL-MCNC: 0.54 MG/DL (ref 0.3–1.2)
BUN BLDV-MCNC: 19 MG/DL (ref 6–20)
CALCIUM SERPL-MCNC: 10.3 MG/DL (ref 8.6–10.4)
CHLORIDE BLD-SCNC: 98 MMOL/L (ref 98–107)
CO2: 30 MMOL/L (ref 20–31)
CREAT SERPL-MCNC: 0.74 MG/DL (ref 0.5–0.9)
EOSINOPHILS RELATIVE PERCENT: 4 % (ref 1–4)
FERRITIN: 118 NG/ML (ref 13–150)
FOLATE: 18.4 NG/ML
GFR AFRICAN AMERICAN: >60 ML/MIN
GFR NON-AFRICAN AMERICAN: >60 ML/MIN
GFR SERPL CREATININE-BSD FRML MDRD: NORMAL ML/MIN/{1.73_M2}
GLUCOSE BLD-MCNC: 94 MG/DL (ref 70–99)
HCT VFR BLD CALC: 45.8 % (ref 36–46)
HEMOGLOBIN: 15.4 G/DL (ref 12–16)
IRON SATURATION: 30 % (ref 20–55)
IRON: 90 UG/DL (ref 37–145)
LYMPHOCYTES # BLD: 29 % (ref 24–44)
MCH RBC QN AUTO: 30.4 PG (ref 26–34)
MCHC RBC AUTO-ENTMCNC: 33.6 G/DL (ref 31–37)
MCV RBC AUTO: 90.6 FL (ref 80–100)
MONOCYTES # BLD: 11 % (ref 2–11)
PDW BLD-RTO: 13.5 % (ref 12.5–15.4)
PLATELET # BLD: 81 K/UL (ref 140–450)
PMV BLD AUTO: 8.1 FL (ref 6–12)
POTASSIUM SERPL-SCNC: 3.7 MMOL/L (ref 3.7–5.3)
RBC # BLD: 5.06 M/UL (ref 4–5.2)
SEG NEUTROPHILS: 55 % (ref 36–66)
SEGMENTED NEUTROPHILS ABSOLUTE COUNT: 2.7 K/UL (ref 1.8–7.7)
SODIUM BLD-SCNC: 137 MMOL/L (ref 135–144)
TOTAL IRON BINDING CAPACITY: 304 UG/DL (ref 250–450)
TOTAL PROTEIN: 7.2 G/DL (ref 6.4–8.3)
UNSATURATED IRON BINDING CAPACITY: 214 UG/DL (ref 112–347)
VITAMIN B-12: 998 PG/ML (ref 232–1245)
WBC # BLD: 4.8 K/UL (ref 3.5–11)

## 2022-06-21 PROCEDURE — G8419 CALC BMI OUT NRM PARAM NOF/U: HCPCS | Performed by: INTERNAL MEDICINE

## 2022-06-21 PROCEDURE — 80053 COMPREHEN METABOLIC PANEL: CPT

## 2022-06-21 PROCEDURE — 36415 COLL VENOUS BLD VENIPUNCTURE: CPT

## 2022-06-21 PROCEDURE — 1036F TOBACCO NON-USER: CPT | Performed by: INTERNAL MEDICINE

## 2022-06-21 PROCEDURE — G8428 CUR MEDS NOT DOCUMENT: HCPCS | Performed by: INTERNAL MEDICINE

## 2022-06-21 PROCEDURE — 85025 COMPLETE CBC W/AUTO DIFF WBC: CPT

## 2022-06-21 PROCEDURE — 83550 IRON BINDING TEST: CPT

## 2022-06-21 PROCEDURE — 83540 ASSAY OF IRON: CPT

## 2022-06-21 PROCEDURE — 99214 OFFICE O/P EST MOD 30 MIN: CPT | Performed by: INTERNAL MEDICINE

## 2022-06-21 PROCEDURE — 82746 ASSAY OF FOLIC ACID SERUM: CPT

## 2022-06-21 PROCEDURE — 82728 ASSAY OF FERRITIN: CPT

## 2022-06-21 PROCEDURE — 82607 VITAMIN B-12: CPT

## 2022-06-21 PROCEDURE — 3017F COLORECTAL CA SCREEN DOC REV: CPT | Performed by: INTERNAL MEDICINE

## 2022-06-21 PROCEDURE — 99211 OFF/OP EST MAY X REQ PHY/QHP: CPT | Performed by: INTERNAL MEDICINE

## 2022-06-21 NOTE — PROGRESS NOTES
DIAGNOSIS:   1. Severe thrombocytopenia, likely ITP with petechia and bruising, no active bleeding, steroid dependent  2. Hashimoto thyroiditis     CURRENT THERAPY:  1. Start steroids course (high dose dex)  07/31/2018 - good initial response  2. Transition to Rituxan, with excellent response, completed September 2018   3. Relapsed ITP in January 2022, retreatment with rituximab. BRIEF CASE HISTORY:  Tena Person is a very pleasant 64 y.o. female who is referred to us for thrombocytopenia. She recently had CBC via PCP which showed PLT count at 5. She has no other history of lab work. She notes she has petechia ecchymosis which started about a year ago and has been bruising easily over the last weeks. She was on steroids for shoulder dislocation a few moths ago and had GI distress. She reports the bruising started after that. She had hypothyroidism and takes Synthroid. She has not started new medication recently and does not take any blood thinners or pain medication. She has no history of stroke, heart attack, previous blood disorders, diabetes, or cancer. She does not drink alcohol or smoke cigarettes. Started on steroids 07/31/2018 - good initial response. She was found to be steroid dependent, plan for Rituxan. Excellent response. She had persistent bone pain and bone marrow biopsy was done and was negative. Her nephew was diagnosed with ITP 11/2019 at age 55.    she was later diagnosed with hashimoto thyroiditis. The patient was monitored and off treatment for about 3 years but in 2022, her platelets were down to 28 and she required another treatment. She was started again on Rituxan and steroids with good response. She underwent mammogram that was abnormal, Bx was done and showed inflammation, fibroadenoma and calcification. INTERIM HISTORY: The patient presents for follow up with ITP surveillance. She is feeling well overall, easy bruisability persists.      PAST MEDICAL HISTORY: has a past medical history of Acute ITP ([facility] [address] Atrial fibrillation ([facility] [address] Hyperthyroidism, and Wears dentures. PAST SURGICAL HISTORY: has a past surgical history that includes  section (); US BREAST BIOPSY W LOC DEVICE 1ST LESION LEFT (Left, 3/18/2022); and US BREAST BIOPSY W LOC DEVICE EACH ADDL LESION LEFT (Left, 3/18/2022). CURRENT MEDICATIONS:  has a current medication list which includes the following prescription(s): omeprazole, cyanocobalamin, vitamin d-3, potassium chloride, zinc, iron, hydrochlorothiazide, levothyroxine, biotin, and homeopathic products. ALLERGIES:  is allergic to bee venom and nuts [peanut-containing drug products]. FAMILY HISTORY: Mother and nephew: colon cancer; Aunt: bladder cancer; mother, aunt, 2 nieces with Hashimoto's; nephew ITP     SOCIAL HISTORY:  reports that she has never smoked. She has never used smokeless tobacco. She reports current alcohol use. She reports that she does not use drugs. REVIEW OF SYSTEMS:   General: No fever or night sweats. Weight is stable. +fatigue improved   ENT: No double or blurred vision, no tinnitus or hearing problem, no dysphagia or sore throat   Respiratory: No chest pain, no shortness of breath, no cough or hemoptysis. Cardiovascular: Denies chest pain, PND or orthopnea. No L E swelling or palpitations. Gastrointestinal:  No vomiting, diarrhea or constipation. +intermittent abdominal pain, nausea - resolved   Genitourinary: Denies dysuria, hematuria, frequency, urgency or incontinence. Neurological: Denies decreased LOC, no sensory or motor focal deficits. +headaches -   Musculoskeletal:  No arthralgia no back pain or joint swelling. Skin: There are no rashes or bleeding. +hair loss   Psychiatric:  No anxiety, no depression. Endocrine: No diabetes or thyroid disease. Hematologic: No bleeding, no adenopathy.      PHYSICAL EXAM: Shows a well appearing 64y.o.-year-old female who is not in pain or distress. Vital Signs: Blood pressure (!) 155/92, pulse 79, temperature 98.4 °F (36.9 °C), temperature source Oral, resp. rate 18, weight 163 lb 3.2 oz (74 kg), not currently breastfeeding. HEENT: Normocephalic and atraumatic. Pupils are equal, round, reactive to light and accommodation. Extraocular muscles are intact. Neck: Showed no JVD, no carotid bruit . Lungs: Clear to auscultation bilaterally. Heart: Regular without any murmur. Abdomen: Soft, mild tenderness LUQ. No hepatosplenomegaly. Extremities: No point tenderness in knee or hip. Lower extremities show no edema, clubbing, or cyanosis. Breasts: Left: no mass appreciated Right: no masses, no lesions Neuro exam: intact cranial nerves bilaterally no motor or sensory deficit, gait is normal. Lymphatic: no adenopathy appreciated in the supraclavicular, axillary, cervical or inguinal area Skin: petechia and bruising; bone marrow site is well healed    REVIEW OF LABORATORY DATA:   Lab Results   Component Value Date    WBC 4.8 06/21/2022    HGB 15.4 06/21/2022    HCT 45.8 06/21/2022    MCV 90.6 06/21/2022    PLT 81 (L) 06/21/2022           Chemistry        Component Value Date/Time     06/21/2022 1043    K 3.7 06/21/2022 1043    CL 98 06/21/2022 1043    CO2 30 06/21/2022 1043    BUN 19 06/21/2022 1043    CREATININE 0.74 06/21/2022 1043        Component Value Date/Time    CALCIUM 10.3 06/21/2022 1043    ALKPHOS 78 06/21/2022 1043    AST 19 06/21/2022 1043    ALT 15 06/21/2022 1043    BILITOT 0.54 06/21/2022 1043          PATHOLOGY:       REVIEW OF RADIOLOGICAL RESULTS:     IMPRESSION:   1. ITP, steroid refractory  2. Started 4 day course of steroids - good initial response. 3. Steroid dependent, plan to transition to Rituxan - excellent response  4. Persistent bone pain, bone marrow was done, negative, ITP active but controlled  5. Hashimoto thyroiditis, on synthroid   6. Left breast mass seen on CT scan, biopsy negative     PLAN:   1.  Her lab work was reviewed, PLT remain stable at 81 indicated sustained response, counts and electrolytes in range otherwise. 2. Clinically she is well with some easy bruising that is minor. 3. We will continue with surveillance. 4. Return in 3 months. Scribe Attestation   This note was created by Virgilio Cooney acting as scribe for the physician signing this note  Electronically Signed  Sue Carney, 6/21/2022  Scribe, 2-[employer]erve [employer]rib[employer]. Attending Attestation   Note was reviewed and edited.   I am in agreement with the note as entered    Eunice Anthony MD  Hematologist/Medical [id number] [facility] hematology oncology physicians

## 2022-06-21 NOTE — TELEPHONE ENCOUNTER
AVS from 6/21/22    rv in 3 months with cbc, cmp    rv scheduled for 9/20 @ 10:45 am with cbc, cmp    Pt was given AVS and appointment schedule     Electronically signed by Andres Kawasaki on 6/21/2022 at 12:04 PM

## 2022-06-30 NOTE — TELEPHONE ENCOUNTER
Duplicate request. Two request for same med in basket Split-Thickness Skin Graft Text: The defect edges were debeveled with a #15 scalpel blade.  Given the location of the defect, shape of the defect and the proximity to free margins a split thickness skin graft was deemed most appropriate.  Using a sterile surgical marker, the primary defect shape was transferred to the donor site. The split thickness graft was then harvested.  The skin graft was then placed in the primary defect and oriented appropriately.

## 2022-07-13 DIAGNOSIS — E03.8 HYPOTHYROIDISM DUE TO HASHIMOTO'S THYROIDITIS: ICD-10-CM

## 2022-07-13 DIAGNOSIS — E06.3 HYPOTHYROIDISM DUE TO HASHIMOTO'S THYROIDITIS: ICD-10-CM

## 2022-07-18 NOTE — TELEPHONE ENCOUNTER
Mixed hyperlipidemia     Essential hypertension     Personal history of diseases of the blood and blood-forming organs and certain disorders involving the immune mechanism     Hypothyroidism due to Hashimoto's thyroiditis     Fatigue

## 2022-07-19 ENCOUNTER — HOSPITAL ENCOUNTER (OUTPATIENT)
Age: 57
Discharge: HOME | End: 2022-07-19
Payer: MEDICARE

## 2022-07-19 DIAGNOSIS — D69.3 ACUTE ITP (HCC): ICD-10-CM

## 2022-07-19 LAB
ABSOLUTE EOS #: 0.3 K/UL (ref 0–0.4)
ABSOLUTE LYMPH #: 1.3 K/UL (ref 1–4.8)
ABSOLUTE MONO #: 0.5 K/UL (ref 0.1–1.2)
ALBUMIN SERPL-MCNC: 4.5 G/DL (ref 3.5–5.2)
ALBUMIN/GLOBULIN RATIO: 1.8 (ref 1–2.5)
ALP BLD-CCNC: 85 U/L (ref 35–104)
ALT SERPL-CCNC: 17 U/L (ref 5–33)
ANION GAP SERPL CALCULATED.3IONS-SCNC: 12 MMOL/L (ref 9–17)
AST SERPL-CCNC: 18 U/L
BASOPHILS # BLD: 1 % (ref 0–2)
BASOPHILS ABSOLUTE: 0 K/UL (ref 0–0.2)
BILIRUB SERPL-MCNC: 0.33 MG/DL (ref 0.3–1.2)
BUN BLDV-MCNC: 17 MG/DL (ref 6–20)
CALCIUM SERPL-MCNC: 10.2 MG/DL (ref 8.6–10.4)
CHLORIDE BLD-SCNC: 103 MMOL/L (ref 98–107)
CO2: 27 MMOL/L (ref 20–31)
CREAT SERPL-MCNC: 0.81 MG/DL (ref 0.5–0.9)
EOSINOPHILS RELATIVE PERCENT: 6 % (ref 1–4)
GFR AFRICAN AMERICAN: >60 ML/MIN
GFR NON-AFRICAN AMERICAN: >60 ML/MIN
GFR SERPL CREATININE-BSD FRML MDRD: NORMAL ML/MIN/{1.73_M2}
GLUCOSE BLD-MCNC: 83 MG/DL (ref 70–99)
HCT VFR BLD CALC: 43.6 % (ref 36–46)
HEMOGLOBIN: 14.9 G/DL (ref 12–16)
LYMPHOCYTES # BLD: 27 % (ref 24–44)
MCH RBC QN AUTO: 31.2 PG (ref 26–34)
MCHC RBC AUTO-ENTMCNC: 34.2 G/DL (ref 31–37)
MCV RBC AUTO: 91.4 FL (ref 80–100)
MONOCYTES # BLD: 10 % (ref 2–11)
PDW BLD-RTO: 13.7 % (ref 12.5–15.4)
PLATELET # BLD: 69 K/UL (ref 140–450)
PMV BLD AUTO: 8.3 FL (ref 6–12)
POTASSIUM SERPL-SCNC: 4 MMOL/L (ref 3.7–5.3)
RBC # BLD: 4.77 M/UL (ref 4–5.2)
SEG NEUTROPHILS: 56 % (ref 36–66)
SEGMENTED NEUTROPHILS ABSOLUTE COUNT: 2.7 K/UL (ref 1.8–7.7)
SODIUM BLD-SCNC: 142 MMOL/L (ref 135–144)
TOTAL PROTEIN: 7 G/DL (ref 6.4–8.3)
WBC # BLD: 4.8 K/UL (ref 3.5–11)

## 2022-07-19 PROCEDURE — 36415 COLL VENOUS BLD VENIPUNCTURE: CPT

## 2022-07-19 PROCEDURE — 80053 COMPREHEN METABOLIC PANEL: CPT

## 2022-07-19 PROCEDURE — 85025 COMPLETE CBC W/AUTO DIFF WBC: CPT

## 2022-07-19 RX ORDER — LEVOTHYROXINE SODIUM 0.07 MG/1
TABLET ORAL
Qty: 90 TABLET | Refills: 1 | Status: SHIPPED | OUTPATIENT
Start: 2022-07-19 | End: 2023-07-18

## 2022-07-25 DIAGNOSIS — I10 ESSENTIAL HYPERTENSION: ICD-10-CM

## 2022-07-25 RX ORDER — HYDROCHLOROTHIAZIDE 25 MG/1
25 TABLET ORAL DAILY
Qty: 90 TABLET | Refills: 1 | Status: SHIPPED | OUTPATIENT
Start: 2022-07-25 | End: 2022-10-05 | Stop reason: SDUPTHER

## 2022-07-25 NOTE — TELEPHONE ENCOUNTER
of ITP     Mixed hyperlipidemia     Essential hypertension     Personal history of diseases of the blood and blood-forming organs and certain disorders involving the immune mechanism     Hypothyroidism due to Hashimoto's thyroiditis     Fatigue

## 2022-07-31 DIAGNOSIS — L65.9 HAIR LOSS: ICD-10-CM

## 2022-07-31 DIAGNOSIS — E87.6 HYPOKALEMIA: ICD-10-CM

## 2022-08-01 NOTE — TELEPHONE ENCOUNTER
LOV 4/25/22  RTO F/U scheduled  LRF 4/28/22    Health Maintenance   Topic Date Due    Cervical cancer screen  Never done    Shingles vaccine (1 of 2) Never done    Colorectal Cancer Screen  07/30/2019    Depression Screen  05/11/2022    COVID-19 Vaccine (1) 04/03/2023 (Originally 2/12/1966)    Flu vaccine (1) 09/01/2022    Breast cancer screen  03/18/2023    DTaP/Tdap/Td vaccine (2 - Td or Tdap) 10/31/2023    Lipids  04/26/2027    Hepatitis C screen  Completed    HIV screen  Completed    Hepatitis A vaccine  Aged Out    Hepatitis B vaccine  Aged Out    Hib vaccine  Aged Out    Meningococcal (ACWY) vaccine  Aged Out    Pneumococcal 0-64 years Vaccine  Aged Out             (applicable per patient's age: Cancer Screenings, Depression Screening, Fall Risk Screening, Immunizations)    Hemoglobin A1C (%)   Date Value   04/26/2022 5.2     LDL Cholesterol (mg/dL)   Date Value   04/26/2022 146 (H)     AST (U/L)   Date Value   07/19/2022 18     ALT (U/L)   Date Value   07/19/2022 17     BUN (mg/dL)   Date Value   07/19/2022 17      (goal A1C is < 7)   (goal LDL is <100) need 30-50% reduction from baseline     BP Readings from Last 3 Encounters:   06/21/22 (!) 155/92   04/25/22 124/80   03/29/22 (!) 161/90    (goal /80)      All Future Testing planned in [facility]:  Lab Frequency Next Occurrence   POCT Fecal Immunochemical Test (FIT) Once 05/16/2022   CBC with Auto Differential     Comprehensive Metabolic Panel     CBC with Auto Differential     Comprehensive Metabolic Panel         Next Visit Date:  Future Appointments   Date Time Provider Leon Light   9/20/2022 10:45 AM Elisa Anthony MD [facility]   10/25/2022  7:30 AM JAYLENE Gonzalez - [facility]            Patient Active Problem List:     Chronic non-seasonal allergic rhinitis     Wears dentures     Family history of breast cancer in sister     Abnormal bruising     Acute ITP (HCC)     Thyroid enlargement     History of ITP

## 2022-08-02 DIAGNOSIS — E87.6 HYPOKALEMIA: ICD-10-CM

## 2022-08-02 DIAGNOSIS — L65.9 HAIR LOSS: ICD-10-CM

## 2022-08-02 RX ORDER — POTASSIUM CHLORIDE 750 MG/1
10 CAPSULE, EXTENDED RELEASE ORAL DAILY
Qty: 90 CAPSULE | Refills: 1 | Status: SHIPPED | OUTPATIENT
Start: 2022-08-02 | End: 2023-01-29

## 2022-08-02 RX ORDER — POTASSIUM CHLORIDE 750 MG/1
CAPSULE, EXTENDED RELEASE ORAL
Qty: 30 CAPSULE | Refills: 0 | OUTPATIENT
Start: 2022-08-02 | End: 2023-08-02

## 2022-08-16 ENCOUNTER — HOSPITAL ENCOUNTER (OUTPATIENT)
Age: 57
Discharge: HOME | End: 2022-08-16
Payer: MEDICARE

## 2022-08-16 DIAGNOSIS — D69.3 ACUTE ITP (HCC): ICD-10-CM

## 2022-08-16 LAB
ABSOLUTE EOS #: 0.3 K/UL (ref 0–0.4)
ABSOLUTE LYMPH #: 1.4 K/UL (ref 1–4.8)
ABSOLUTE MONO #: 0.6 K/UL (ref 0.1–1.2)
ALBUMIN SERPL-MCNC: 5 G/DL (ref 3.5–5.2)
ALBUMIN/GLOBULIN RATIO: 2.3 (ref 1–2.5)
ALP BLD-CCNC: 84 U/L (ref 35–104)
ALT SERPL-CCNC: 16 U/L (ref 5–33)
ANION GAP SERPL CALCULATED.3IONS-SCNC: 10 MMOL/L (ref 9–17)
AST SERPL-CCNC: 17 U/L
BASOPHILS # BLD: 1 % (ref 0–2)
BASOPHILS ABSOLUTE: 0 K/UL (ref 0–0.2)
BILIRUB SERPL-MCNC: 0.52 MG/DL (ref 0.3–1.2)
BUN BLDV-MCNC: 17 MG/DL (ref 6–20)
CALCIUM SERPL-MCNC: 10.1 MG/DL (ref 8.6–10.4)
CHLORIDE BLD-SCNC: 99 MMOL/L (ref 98–107)
CO2: 29 MMOL/L (ref 20–31)
CREAT SERPL-MCNC: 0.79 MG/DL (ref 0.5–0.9)
EOSINOPHILS RELATIVE PERCENT: 5 % (ref 1–4)
GFR AFRICAN AMERICAN: >60 ML/MIN
GFR NON-AFRICAN AMERICAN: >60 ML/MIN
GFR SERPL CREATININE-BSD FRML MDRD: ABNORMAL ML/MIN/{1.73_M2}
GLUCOSE BLD-MCNC: 66 MG/DL (ref 70–99)
HCT VFR BLD CALC: 45.5 % (ref 36–46)
HEMOGLOBIN: 15.7 G/DL (ref 12–16)
LYMPHOCYTES # BLD: 26 % (ref 24–44)
MCH RBC QN AUTO: 30.9 PG (ref 26–34)
MCHC RBC AUTO-ENTMCNC: 34.5 G/DL (ref 31–37)
MCV RBC AUTO: 89.6 FL (ref 80–100)
MONOCYTES # BLD: 11 % (ref 2–11)
PDW BLD-RTO: 13.7 % (ref 12.5–15.4)
PLATELET # BLD: 88 K/UL (ref 140–450)
PMV BLD AUTO: 8.1 FL (ref 6–12)
POTASSIUM SERPL-SCNC: 3.4 MMOL/L (ref 3.7–5.3)
RBC # BLD: 5.07 M/UL (ref 4–5.2)
SEG NEUTROPHILS: 57 % (ref 36–66)
SEGMENTED NEUTROPHILS ABSOLUTE COUNT: 3 K/UL (ref 1.8–7.7)
SODIUM BLD-SCNC: 138 MMOL/L (ref 135–144)
TOTAL PROTEIN: 7.2 G/DL (ref 6.4–8.3)
WBC # BLD: 5.2 K/UL (ref 3.5–11)

## 2022-08-16 PROCEDURE — 36415 COLL VENOUS BLD VENIPUNCTURE: CPT

## 2022-08-16 PROCEDURE — 80053 COMPREHEN METABOLIC PANEL: CPT

## 2022-08-16 PROCEDURE — 85025 COMPLETE CBC W/AUTO DIFF WBC: CPT

## 2022-09-20 ENCOUNTER — HOSPITAL ENCOUNTER (OUTPATIENT)
Age: 57
Discharge: HOME | End: 2022-09-20
Payer: MEDICARE

## 2022-09-20 ENCOUNTER — OFFICE VISIT (OUTPATIENT)
Dept: ONCOLOGY | Age: 57
End: 2022-09-20
Payer: MEDICARE

## 2022-09-20 VITALS
HEART RATE: 76 BPM | BODY MASS INDEX: 26.73 KG/M2 | DIASTOLIC BLOOD PRESSURE: 89 MMHG | TEMPERATURE: 97.1 F | WEIGHT: 165.6 LBS | SYSTOLIC BLOOD PRESSURE: 162 MMHG

## 2022-09-20 DIAGNOSIS — Z86.2 HISTORY OF ITP: Primary | ICD-10-CM

## 2022-09-20 DIAGNOSIS — D69.3 ACUTE ITP (HCC): ICD-10-CM

## 2022-09-20 LAB
ABSOLUTE EOS #: 0.3 K/UL (ref 0–0.4)
ABSOLUTE LYMPH #: 1.3 K/UL (ref 1–4.8)
ABSOLUTE MONO #: 0.5 K/UL (ref 0.1–1.2)
ALBUMIN SERPL-MCNC: 4.5 G/DL (ref 3.5–5.2)
ALBUMIN/GLOBULIN RATIO: 1.8 (ref 1–2.5)
ALP BLD-CCNC: 85 U/L (ref 35–104)
ALT SERPL-CCNC: 18 U/L (ref 5–33)
ANION GAP SERPL CALCULATED.3IONS-SCNC: 10 MMOL/L (ref 9–17)
AST SERPL-CCNC: 20 U/L
BASOPHILS # BLD: 1 % (ref 0–2)
BASOPHILS ABSOLUTE: 0 K/UL (ref 0–0.2)
BILIRUB SERPL-MCNC: 0.6 MG/DL (ref 0.3–1.2)
BUN BLDV-MCNC: 18 MG/DL (ref 6–20)
CALCIUM SERPL-MCNC: 9.5 MG/DL (ref 8.6–10.4)
CHLORIDE BLD-SCNC: 99 MMOL/L (ref 98–107)
CO2: 28 MMOL/L (ref 20–31)
CREAT SERPL-MCNC: 0.85 MG/DL (ref 0.5–0.9)
EOSINOPHILS RELATIVE PERCENT: 6 % (ref 1–4)
GFR AFRICAN AMERICAN: >60 ML/MIN
GFR NON-AFRICAN AMERICAN: >60 ML/MIN
GFR SERPL CREATININE-BSD FRML MDRD: NORMAL ML/MIN/{1.73_M2}
GLUCOSE BLD-MCNC: 92 MG/DL (ref 70–99)
HCT VFR BLD CALC: 43.1 % (ref 36–46)
HEMOGLOBIN: 14.9 G/DL (ref 12–16)
LYMPHOCYTES # BLD: 24 % (ref 24–44)
MCH RBC QN AUTO: 31.3 PG (ref 26–34)
MCHC RBC AUTO-ENTMCNC: 34.6 G/DL (ref 31–37)
MCV RBC AUTO: 90.6 FL (ref 80–100)
MONOCYTES # BLD: 10 % (ref 2–11)
PDW BLD-RTO: 13.9 % (ref 12.5–15.4)
PLATELET # BLD: 126 K/UL (ref 140–450)
PMV BLD AUTO: 7.7 FL (ref 6–12)
POTASSIUM SERPL-SCNC: 3.7 MMOL/L (ref 3.7–5.3)
RBC # BLD: 4.75 M/UL (ref 4–5.2)
SEG NEUTROPHILS: 59 % (ref 36–66)
SEGMENTED NEUTROPHILS ABSOLUTE COUNT: 3.2 K/UL (ref 1.8–7.7)
SODIUM BLD-SCNC: 137 MMOL/L (ref 135–144)
TOTAL PROTEIN: 7 G/DL (ref 6.4–8.3)
WBC # BLD: 5.4 K/UL (ref 3.5–11)

## 2022-09-20 PROCEDURE — 36415 COLL VENOUS BLD VENIPUNCTURE: CPT

## 2022-09-20 PROCEDURE — 3017F COLORECTAL CA SCREEN DOC REV: CPT | Performed by: INTERNAL MEDICINE

## 2022-09-20 PROCEDURE — 99211 OFF/OP EST MAY X REQ PHY/QHP: CPT | Performed by: INTERNAL MEDICINE

## 2022-09-20 PROCEDURE — 80053 COMPREHEN METABOLIC PANEL: CPT

## 2022-09-20 PROCEDURE — G8427 DOCREV CUR MEDS BY ELIG CLIN: HCPCS | Performed by: INTERNAL MEDICINE

## 2022-09-20 PROCEDURE — 85025 COMPLETE CBC W/AUTO DIFF WBC: CPT

## 2022-09-20 PROCEDURE — 1036F TOBACCO NON-USER: CPT | Performed by: INTERNAL MEDICINE

## 2022-09-20 PROCEDURE — G8419 CALC BMI OUT NRM PARAM NOF/U: HCPCS | Performed by: INTERNAL MEDICINE

## 2022-09-20 PROCEDURE — 99214 OFFICE O/P EST MOD 30 MIN: CPT | Performed by: INTERNAL MEDICINE

## 2022-09-20 NOTE — PROGRESS NOTES
DIAGNOSIS:   Severe thrombocytopenia, likely ITP with petechia and bruising, no active bleeding, steroid dependent  Hashimoto thyroiditis     CURRENT THERAPY:  Start steroids course (high dose dex)  07/31/2018 - good initial response  Transition to Rituxan, with excellent response, completed September 2018   Relapsed ITP in January 2022, retreatment with rituximab. BRIEF CASE HISTORY:  Sharif Pope is a very pleasant 62 y.o. female who is referred to us for thrombocytopenia. She recently had CBC via PCP which showed PLT count at 5. She has no other history of lab work. She notes she has petechia ecchymosis which started about a year ago and has been bruising easily over the last weeks. She was on steroids for shoulder dislocation a few moths ago and had GI distress. She reports the bruising started after that. She had hypothyroidism and takes Synthroid. She has not started new medication recently and does not take any blood thinners or pain medication. She has no history of stroke, heart attack, previous blood disorders, diabetes, or cancer. She does not drink alcohol or smoke cigarettes. Started on steroids 07/31/2018 - good initial response. She was found to be steroid dependent, plan for Rituxan. Excellent response. She had persistent bone pain and bone marrow biopsy was done and was negative. Her nephew was diagnosed with ITP 11/2019 at age 55.    she was later diagnosed with hashimoto thyroiditis. The patient was monitored and off treatment for about 3 years but in 2022, her platelets were down to 28 and she required another treatment. She was started again on Rituxan and steroids with good response. She underwent mammogram that was abnormal, Bx was done and showed inflammation, fibroadenoma and calcification. INTERIM HISTORY: The patient presents for follow up with ITP surveillance. She is feeling well overall with no symptoms or concerns. She started papaya leaf extract.  She denies any bleeding or bruising. PAST MEDICAL HISTORY: has a past medical history of Acute ITP ([facility] [address] Atrial fibrillation ([address] Hyperthyroidism, and Wears dentures. PAST SURGICAL HISTORY: has a past surgical history that includes  section (); US BREAST BIOPSY W LOC DEVICE 1ST LESION LEFT (Left, 3/18/2022); and US BREAST BIOPSY W LOC DEVICE EACH ADDL LESION LEFT (Left, 3/18/2022). CURRENT MEDICATIONS:  has a current medication list which includes the following prescription(s): potassium chloride, hydrochlorothiazide, levothyroxine, omeprazole, cyanocobalamin, vitamin d-3, zinc, iron, biotin, and homeopathic products. ALLERGIES:  is allergic to bee venom and nuts [peanut-containing drug products]. FAMILY HISTORY: Mother and nephew: colon cancer; Aunt: bladder cancer; mother, aunt, 2 nieces with Hashimoto's; nephew ITP     SOCIAL HISTORY:  reports that she has never smoked. She has never used smokeless tobacco. She reports current alcohol use. She reports that she does not use drugs. REVIEW OF SYSTEMS:   General: No fever or night sweats. Weight is stable. +fatigue improved   ENT: No double or blurred vision, no tinnitus or hearing problem, no dysphagia or sore throat   Respiratory: No chest pain, no shortness of breath, no cough or hemoptysis. Cardiovascular: Denies chest pain, PND or orthopnea. No L E swelling or palpitations. Gastrointestinal:  No vomiting, diarrhea or constipation. +intermittent abdominal pain, nausea - resolved   Genitourinary: Denies dysuria, hematuria, frequency, urgency or incontinence. Neurological: Denies decreased LOC, no sensory or motor focal deficits. +headaches -   Musculoskeletal:  No arthralgia no back pain or joint swelling. Skin: There are no rashes or bleeding. +hair loss   Psychiatric:  No anxiety, no depression. Endocrine: No diabetes or thyroid disease. Hematologic: No bleeding, no adenopathy.      PHYSICAL EXAM: Shows a well appearing 62y.o.-year-old female who is not in pain or distress. Vital Signs: not currently breastfeeding. HEENT: Normocephalic and atraumatic. Pupils are equal, round, reactive to light and accommodation. Extraocular muscles are intact. Neck: Showed no JVD, no carotid bruit . Lungs: Clear to auscultation bilaterally. Heart: Regular without any murmur. Abdomen: Soft, mild tenderness LUQ. No hepatosplenomegaly. Extremities: No point tenderness in knee or hip. Lower extremities show no edema, clubbing, or cyanosis. Breasts: Left: no mass appreciated Right: no masses, no lesions Neuro exam: intact cranial nerves bilaterally no motor or sensory deficit, gait is normal. Lymphatic: no adenopathy appreciated in the supraclavicular, axillary, cervical or inguinal area Skin: petechia and bruising; bone marrow site is well healed    REVIEW OF LABORATORY DATA:   Lab Results   Component Value Date    WBC 5.4 09/20/2022    HGB 14.9 09/20/2022    HCT 43.1 09/20/2022    MCV 90.6 09/20/2022     (L) 09/20/2022           Chemistry        Component Value Date/Time     08/16/2022 1021    K 3.4 (L) 08/16/2022 1021    CL 99 08/16/2022 1021    CO2 29 08/16/2022 1021    BUN 17 08/16/2022 1021    CREATININE 0.79 08/16/2022 1021        Component Value Date/Time    CALCIUM 10.1 08/16/2022 1021    ALKPHOS 84 08/16/2022 1021    AST 17 08/16/2022 1021    ALT 16 08/16/2022 1021    BILITOT 0.52 08/16/2022 1021          PATHOLOGY:       REVIEW OF RADIOLOGICAL RESULTS:     IMPRESSION:   ITP, steroid refractory  Started 4 day course of steroids - good initial response. Steroid dependent, plan to transition to Rituxan - excellent response  Persistent bone pain, bone marrow was done, negative, ITP active but controlled  Hashimoto thyroiditis, on synthroid   Left breast mass seen on CT scan, biopsy negative     PLAN:   Her lab work was reviewed, her PLT are low but adequate, mild hypokalemia, counts and electrolytes in range otherwise.    She is doing well with no symptoms. We reviewed data for avoiding Asprin and things containing quinine and minimizing alcohol. We will continue with surveillance. Return in 3 months. Scribe Attestation   This note was created by Gabriel Vale acting as scribe for the physician signing this note  Electronically Signed  Sue Garcia, 9/20/2022  Scribe, [employer]. Attending Attestation   Note was reviewed and edited.   I am in agreement with the note as entered    ANTHONY TODD [facility] MD Raj  Hematologist/Medical [id number] [facility] hematology oncology physicians

## 2022-09-21 ENCOUNTER — TELEPHONE (OUTPATIENT)
Dept: ONCOLOGY | Age: 57
End: 2022-09-21

## 2022-09-21 NOTE — TELEPHONE ENCOUNTER
AVS from 9/20/22      Return in 3 months with CBC and CMP.   Stop the monthly CBC  Rv scheduled for 12/13 @ 9:30 am     Pt was given AVS and appointment schedule    Electronically signed by Otilio Severs on 9/21/2022 at 8:30 AM

## 2022-10-04 ENCOUNTER — NURSE ONLY (OUTPATIENT)
Dept: FAMILY MEDICINE CLINIC | Age: 57
End: 2022-10-04
Payer: MEDICARE

## 2022-10-04 VITALS — OXYGEN SATURATION: 96 % | RESPIRATION RATE: 16 BRPM | TEMPERATURE: 97.8 F | HEART RATE: 58 BPM

## 2022-10-04 DIAGNOSIS — Z23 NEED FOR INFLUENZA VACCINATION: Primary | ICD-10-CM

## 2022-10-04 PROCEDURE — 90471 IMMUNIZATION ADMIN: CPT | Performed by: NURSE PRACTITIONER

## 2022-10-04 PROCEDURE — 90674 CCIIV4 VAC NO PRSV 0.5 ML IM: CPT | Performed by: NURSE PRACTITIONER

## 2022-10-04 NOTE — PROGRESS NOTES
Marsha Kaur presents in the office today for Influenza vaccination. she denies any fevers or illness. She tolerated the vaccination(s) well and was instructed to contact the office immediately if any significant sign of adverse reaction were to occur.

## 2022-10-05 DIAGNOSIS — I10 ESSENTIAL HYPERTENSION: ICD-10-CM

## 2022-10-05 RX ORDER — HYDROCHLOROTHIAZIDE 25 MG/1
25 TABLET ORAL DAILY
Qty: 90 TABLET | Refills: 1 | Status: SHIPPED | OUTPATIENT
Start: 2022-10-05 | End: 2023-04-03

## 2022-10-05 NOTE — TELEPHONE ENCOUNTER
Last visit: 4/25/22  Last Med refill: 7/25/22  Does patient have enough medication for 72 hours: Yes    Next Visit Date:  Future Appointments   Date Time Provider Leon Light   10/25/2022  7:30 AM JAYLENE Camilo CNP [id number]   12/13/2022  9:30 AM MD Yang Allred Maintenance   Topic Date Due    Cervical cancer screen  Never done    Shingles vaccine (1 of 2) Never done    Colorectal Cancer Screen  07/30/2019    Depression Screen  05/11/2022    COVID-19 Vaccine (1) 04/03/2023 (Originally 2/12/1966)    Breast cancer screen  02/04/2023    DTaP/Tdap/Td vaccine (2 - Td or Tdap) 10/31/2023    Lipids  04/26/2027    Flu vaccine  Completed    Hepatitis C screen  Completed    HIV screen  Completed    Hepatitis A vaccine  Aged Out    Hepatitis B vaccine  Aged Out    Hib vaccine  Aged Out    Meningococcal (ACWY) vaccine  Aged Out    Pneumococcal 0-64 years Vaccine  Aged Out       Hemoglobin A1C (%)   Date Value   04/26/2022 5.2             ( goal A1C is < 7)   No results found for: LABMICR  LDL Cholesterol (mg/dL)   Date Value   04/26/2022 146 (H)   02/29/2020 120       (goal LDL is <100)   AST (U/L)   Date Value   09/20/2022 20     ALT (U/L)   Date Value   09/20/2022 18     BUN (mg/dL)   Date Value   09/20/2022 18     BP Readings from Last 3 Encounters:   09/20/22 (!) 162/89   06/21/22 (!) 155/92   04/25/22 124/80          (goal 120/80)    All Future Testing planned in [facility]  Lab Frequency Next Occurrence   POCT Fecal Immunochemical Test (FIT) Once 05/16/2022   CBC with Auto Differential     Comprehensive Metabolic Panel                 Patient Active Problem List:     Chronic non-seasonal allergic rhinitis     Wears dentures     Family history of breast cancer in sister     Abnormal bruising     Acute ITP (HCC)     Thyroid enlargement     History of ITP     Mixed hyperlipidemia     Essential hypertension     Personal history of diseases of the blood and blood-forming organs and certain disorders involving the immune mechanism     Hypothyroidism due to Hashimoto's thyroiditis     Fatigue

## 2022-10-22 DIAGNOSIS — Z86.2 HISTORY OF ITP: ICD-10-CM

## 2022-10-24 RX ORDER — OMEPRAZOLE 20 MG/1
20 CAPSULE, DELAYED RELEASE ORAL DAILY
Qty: 30 CAPSULE | Refills: 3 | Status: SHIPPED | OUTPATIENT
Start: 2022-10-24

## 2022-12-13 ENCOUNTER — HOSPITAL ENCOUNTER (OUTPATIENT)
Age: 57
Discharge: HOME | End: 2022-12-13
Payer: MEDICARE

## 2022-12-13 ENCOUNTER — OFFICE VISIT (OUTPATIENT)
Dept: ONCOLOGY | Age: 57
End: 2022-12-13
Payer: MEDICARE

## 2022-12-13 VITALS
SYSTOLIC BLOOD PRESSURE: 150 MMHG | WEIGHT: 167 LBS | TEMPERATURE: 96.9 F | HEART RATE: 72 BPM | BODY MASS INDEX: 26.95 KG/M2 | DIASTOLIC BLOOD PRESSURE: 83 MMHG

## 2022-12-13 DIAGNOSIS — D69.3 CHRONIC ITP (IDIOPATHIC THROMBOCYTOPENIA) (HCC): Primary | ICD-10-CM

## 2022-12-13 DIAGNOSIS — D69.3 CHRONIC ITP (IDIOPATHIC THROMBOCYTOPENIA) (HCC): ICD-10-CM

## 2022-12-13 LAB
ABSOLUTE EOS #: 0.2 K/UL (ref 0–0.4)
ABSOLUTE LYMPH #: 1.4 K/UL (ref 1–4.8)
ABSOLUTE MONO #: 0.5 K/UL (ref 0.1–1.2)
ALBUMIN SERPL-MCNC: 4.5 G/DL (ref 3.5–5.2)
ALBUMIN/GLOBULIN RATIO: 1.8 (ref 1–2.5)
ALP BLD-CCNC: 85 U/L (ref 35–104)
ALT SERPL-CCNC: 14 U/L (ref 5–33)
ANION GAP SERPL CALCULATED.3IONS-SCNC: 12 MMOL/L (ref 9–17)
AST SERPL-CCNC: 17 U/L
BASOPHILS # BLD: 1 % (ref 0–2)
BASOPHILS ABSOLUTE: 0 K/UL (ref 0–0.2)
BILIRUB SERPL-MCNC: 0.5 MG/DL (ref 0.3–1.2)
BUN BLDV-MCNC: 15 MG/DL (ref 6–20)
CALCIUM SERPL-MCNC: 9.8 MG/DL (ref 8.6–10.4)
CHLORIDE BLD-SCNC: 101 MMOL/L (ref 98–107)
CO2: 27 MMOL/L (ref 20–31)
CREAT SERPL-MCNC: 0.86 MG/DL (ref 0.5–0.9)
EOSINOPHILS RELATIVE PERCENT: 5 % (ref 1–4)
GFR SERPL CREATININE-BSD FRML MDRD: >60 ML/MIN/1.73M2
GLUCOSE BLD-MCNC: 81 MG/DL (ref 70–99)
HCT VFR BLD CALC: 45.1 % (ref 36–46)
HEMOGLOBIN: 15.6 G/DL (ref 12–16)
LYMPHOCYTES # BLD: 26 % (ref 24–44)
MCH RBC QN AUTO: 31.9 PG (ref 26–34)
MCHC RBC AUTO-ENTMCNC: 34.7 G/DL (ref 31–37)
MCV RBC AUTO: 92.1 FL (ref 80–100)
MONOCYTES # BLD: 10 % (ref 2–11)
PDW BLD-RTO: 13.7 % (ref 12.5–15.4)
PLATELET # BLD: 150 K/UL (ref 140–450)
PMV BLD AUTO: 7.6 FL (ref 6–12)
POTASSIUM SERPL-SCNC: 3.8 MMOL/L (ref 3.7–5.3)
RBC # BLD: 4.89 M/UL (ref 4–5.2)
SEG NEUTROPHILS: 58 % (ref 36–66)
SEGMENTED NEUTROPHILS ABSOLUTE COUNT: 3.2 K/UL (ref 1.8–7.7)
SODIUM BLD-SCNC: 140 MMOL/L (ref 135–144)
TOTAL PROTEIN: 7 G/DL (ref 6.4–8.3)
WBC # BLD: 5.4 K/UL (ref 3.5–11)

## 2022-12-13 PROCEDURE — 99214 OFFICE O/P EST MOD 30 MIN: CPT | Performed by: INTERNAL MEDICINE

## 2022-12-13 PROCEDURE — 3078F DIAST BP <80 MM HG: CPT | Performed by: INTERNAL MEDICINE

## 2022-12-13 PROCEDURE — G8427 DOCREV CUR MEDS BY ELIG CLIN: HCPCS | Performed by: INTERNAL MEDICINE

## 2022-12-13 PROCEDURE — 3017F COLORECTAL CA SCREEN DOC REV: CPT | Performed by: INTERNAL MEDICINE

## 2022-12-13 PROCEDURE — 85025 COMPLETE CBC W/AUTO DIFF WBC: CPT

## 2022-12-13 PROCEDURE — G8419 CALC BMI OUT NRM PARAM NOF/U: HCPCS | Performed by: INTERNAL MEDICINE

## 2022-12-13 PROCEDURE — G8482 FLU IMMUNIZE ORDER/ADMIN: HCPCS | Performed by: INTERNAL MEDICINE

## 2022-12-13 PROCEDURE — 99211 OFF/OP EST MAY X REQ PHY/QHP: CPT

## 2022-12-13 PROCEDURE — 36415 COLL VENOUS BLD VENIPUNCTURE: CPT

## 2022-12-13 PROCEDURE — 1036F TOBACCO NON-USER: CPT | Performed by: INTERNAL MEDICINE

## 2022-12-13 PROCEDURE — 80053 COMPREHEN METABOLIC PANEL: CPT

## 2022-12-13 PROCEDURE — 3074F SYST BP LT 130 MM HG: CPT | Performed by: INTERNAL MEDICINE

## 2022-12-13 NOTE — PROGRESS NOTES
DIAGNOSIS:   Severe thrombocytopenia, likely ITP with petechia and bruising, no active bleeding, steroid dependent  Hashimoto thyroiditis     CURRENT THERAPY:  Start steroids course (high dose dex)  07/31/2018 - good initial response  Transition to Rituxan, with excellent response, completed September 2018   Relapsed ITP in January 2022, retreatment with rituximab. BRIEF CASE HISTORY:  Som Bullcok is a very pleasant 62 y.o. female who is referred to us for thrombocytopenia. She recently had CBC via PCP which showed PLT count at 5. She has no other history of lab work. She notes she has petechia ecchymosis which started about a year ago and has been bruising easily over the last weeks. She was on steroids for shoulder dislocation a few moths ago and had GI distress. She reports the bruising started after that. She had hypothyroidism and takes Synthroid. She has not started new medication recently and does not take any blood thinners or pain medication. She has no history of stroke, heart attack, previous blood disorders, diabetes, or cancer. She does not drink alcohol or smoke cigarettes. Started on steroids 07/31/2018 - good initial response. She was found to be steroid dependent, plan for Rituxan. Excellent response. She had persistent bone pain and bone marrow biopsy was done and was negative. Her nephew was diagnosed with ITP 11/2019 at age 55.    she was later diagnosed with hashimoto thyroiditis. The patient was monitored and off treatment for about 3 years but in 2022, her platelets were down to 28 and she required another treatment. She was started again on Rituxan and steroids with good response. She underwent mammogram that was abnormal, Bx was done and showed inflammation, fibroadenoma and calcification. INTERIM HISTORY: The patient presents for follow up with ITP surveillance.  She reports she has been well with no new concerns or complaints, she specifically denies any bleeding or bruising concerns. She continues to take papaya leaf extract daily. PAST MEDICAL HISTORY: has a past medical history of Acute ITP ([address] Atrial fibrillation ([address] Hyperthyroidism, and Wears dentures. PAST SURGICAL HISTORY: has a past surgical history that includes  section (); US BREAST BIOPSY W LOC DEVICE 1ST LESION LEFT (Left, 3/18/2022); and US BREAST BIOPSY W LOC DEVICE EACH ADDL LESION LEFT (Left, 3/18/2022). CURRENT MEDICATIONS:  has a current medication list which includes the following prescription(s): omeprazole, hydrochlorothiazide, potassium chloride, levothyroxine, cyanocobalamin, vitamin d-3, zinc, iron, biotin, and homeopathic products. ALLERGIES:  is allergic to bee venom and nuts [peanut-containing drug products]. FAMILY HISTORY: Mother and nephew: colon cancer; Aunt: bladder cancer; mother, aunt, 2 nieces with Hashimoto's; nephew ITP     SOCIAL HISTORY:  reports that she has never smoked. She has never used smokeless tobacco. She reports current alcohol use. She reports that she does not use drugs. REVIEW OF SYSTEMS:   General: No fever or night sweats. Weight is stable. ENT: No double or blurred vision, no tinnitus or hearing problem, no dysphagia or sore throat   Respiratory: No chest pain, no shortness of breath, no cough or hemoptysis. Cardiovascular: Denies chest pain, PND or orthopnea. No L E swelling or palpitations. Gastrointestinal:  No vomiting, diarrhea or constipation, abdominal pain, nausea   Genitourinary: Denies dysuria, hematuria, frequency, urgency or incontinence. Neurological: Denies decreased LOC, no sensory or motor focal deficits, headaches   Musculoskeletal:  No arthralgia no back pain or joint swelling. Skin: There are no rashes or bleeding. Psychiatric:  No anxiety, no depression. Endocrine: No diabetes or thyroid disease. Hematologic: No bleeding, no adenopathy.      PHYSICAL EXAM: Shows a well appearing 62 y.o.-year-old female who is not in pain or distress. Vital Signs: Blood pressure (!) 150/83, pulse 72, temperature 96.9 °F (36.1 °C), temperature source Temporal, weight 167 lb (75.8 kg), not currently breastfeeding. HEENT: Normocephalic and atraumatic. Pupils are equal, round, reactive to light and accommodation. Extraocular muscles are intact. Neck: Showed no JVD, no carotid bruit . Lungs: Clear to auscultation bilaterally. Heart: Regular without any murmur. Abdomen: Soft, mild tenderness LUQ. No hepatosplenomegaly. Extremities: No point tenderness in knee or hip. Lower extremities show no edema, clubbing, or cyanosis. Breasts: Left: no mass appreciated Right: no masses, no lesions Neuro exam: intact cranial nerves bilaterally no motor or sensory deficit, gait is normal. Lymphatic: no adenopathy appreciated in the supraclavicular, axillary, cervical or inguinal area Skin: petechia and bruising; bone marrow site is well healed    REVIEW OF LABORATORY DATA:   Lab Results   Component Value Date    WBC 5.4 12/13/2022    HGB 15.6 12/13/2022    HCT 45.1 12/13/2022    MCV 92.1 12/13/2022     12/13/2022           Chemistry        Component Value Date/Time     12/13/2022 0927    K 3.8 12/13/2022 0927     12/13/2022 0927    CO2 27 12/13/2022 0927    BUN 15 12/13/2022 0927    CREATININE 0.86 12/13/2022 0927        Component Value Date/Time    CALCIUM 9.8 12/13/2022 0927    ALKPHOS 85 12/13/2022 0927    AST 17 12/13/2022 0927    ALT 14 12/13/2022 0927    BILITOT 0.5 12/13/2022 0927          PATHOLOGY:       REVIEW OF RADIOLOGICAL RESULTS:     IMPRESSION:   ITP, steroid refractory  Started 4 day course of steroids - good initial response.   Steroid dependent, plan to transition to Rituxan - excellent response  Persistent bone pain, bone marrow was done, negative, ITP active but controlled  Hashimoto thyroiditis, on synthroid   Left breast mass seen on CT scan, biopsy negative     PLAN:   Her lab work was reviewed, her PLT are in range, all other counts are also in range, her electrolytes are also in range, liver and renal functions are normal.   Clinically she is well with no symptoms, her disease remains inactive. We will continue with surveillance unchanged. Return in 3 months with lab work. Scribe Attestation   This note was created by Ze Quintanilla acting as scribe for the physician signing this note  Electronically Signed  Ze Quintanilla, 12/13/2022  Scribe, [employer]. Attending Attestation   Note was reviewed and edited.   I am in agreement with the note as entered    Luna Anthony MD  Hematologist/Medical [id number] [facility] hematology oncology physicians

## 2022-12-14 ENCOUNTER — TELEPHONE (OUTPATIENT)
Dept: ONCOLOGY | Age: 57
End: 2022-12-14

## 2022-12-14 NOTE — TELEPHONE ENCOUNTER
AVS from 12/13/22    Rv in 3 months, need cbc, cmp prior to RV     Rv scheduled for 3/14 @ 9:30 am     Pt will have labs drawn one week prior to RV    Pt was given AVS and appointment schedule    Electronically signed by Anselmo Lei on 12/14/2022 at 8:56 AM

## 2023-02-05 DIAGNOSIS — L65.9 HAIR LOSS: ICD-10-CM

## 2023-02-05 DIAGNOSIS — E87.6 HYPOKALEMIA: ICD-10-CM

## 2023-02-05 DIAGNOSIS — E06.3 HYPOTHYROIDISM DUE TO HASHIMOTO'S THYROIDITIS: ICD-10-CM

## 2023-02-05 DIAGNOSIS — E03.8 HYPOTHYROIDISM DUE TO HASHIMOTO'S THYROIDITIS: ICD-10-CM

## 2023-02-07 RX ORDER — POTASSIUM CHLORIDE 750 MG/1
10 CAPSULE, EXTENDED RELEASE ORAL DAILY
Qty: 90 CAPSULE | Refills: 0 | OUTPATIENT
Start: 2023-02-07 | End: 2023-05-08

## 2023-02-07 RX ORDER — LEVOTHYROXINE SODIUM 0.07 MG/1
TABLET ORAL
Qty: 90 TABLET | Refills: 0 | OUTPATIENT
Start: 2023-02-07 | End: 2024-02-04

## 2023-03-09 ENCOUNTER — PATIENT MESSAGE (OUTPATIENT)
Dept: FAMILY MEDICINE CLINIC | Age: 58
End: 2023-03-09

## 2023-03-09 DIAGNOSIS — D69.3 ACUTE ITP (HCC): Primary | ICD-10-CM

## 2023-03-09 NOTE — TELEPHONE ENCOUNTER
From: Abbey Mercer  To: Isaak Montes  Sent: 3/9/2023 9:25 AM EST  Subject: Blood    Hi Sue,   I will be having a blood draw on Tuesday. If you want any test run, please include them.  I'll fast.  Thanks   Rama Staley

## 2023-03-14 ENCOUNTER — HOSPITAL ENCOUNTER (OUTPATIENT)
Age: 58
Discharge: HOME | End: 2023-03-14
Payer: MEDICAID

## 2023-03-14 ENCOUNTER — OFFICE VISIT (OUTPATIENT)
Dept: ONCOLOGY | Age: 58
End: 2023-03-14
Payer: MEDICAID

## 2023-03-14 VITALS
HEART RATE: 75 BPM | TEMPERATURE: 97.8 F | BODY MASS INDEX: 26.91 KG/M2 | WEIGHT: 166.7 LBS | SYSTOLIC BLOOD PRESSURE: 148 MMHG | RESPIRATION RATE: 16 BRPM | DIASTOLIC BLOOD PRESSURE: 87 MMHG

## 2023-03-14 DIAGNOSIS — D69.3 ACUTE ITP (HCC): ICD-10-CM

## 2023-03-14 DIAGNOSIS — D69.3 ACUTE ITP (HCC): Primary | ICD-10-CM

## 2023-03-14 LAB
ABSOLUTE EOS #: 0.3 K/UL (ref 0–0.4)
ABSOLUTE LYMPH #: 1.3 K/UL (ref 1–4.8)
ABSOLUTE MONO #: 0.5 K/UL (ref 0.1–1.2)
ALBUMIN SERPL-MCNC: 4.7 G/DL (ref 3.5–5.2)
ALBUMIN/GLOBULIN RATIO: 1.9 (ref 1–2.5)
ALP SERPL-CCNC: 81 U/L (ref 35–104)
ALT SERPL-CCNC: 16 U/L (ref 5–33)
ANION GAP SERPL CALCULATED.3IONS-SCNC: 13 MMOL/L (ref 9–17)
AST SERPL-CCNC: 18 U/L
BASOPHILS # BLD: 1 % (ref 0–2)
BASOPHILS ABSOLUTE: 0 K/UL (ref 0–0.2)
BILIRUB SERPL-MCNC: 0.7 MG/DL (ref 0.3–1.2)
BUN SERPL-MCNC: 13 MG/DL (ref 6–20)
CALCIUM SERPL-MCNC: 10 MG/DL (ref 8.6–10.4)
CHLORIDE SERPL-SCNC: 102 MMOL/L (ref 98–107)
CO2 SERPL-SCNC: 28 MMOL/L (ref 20–31)
CREAT SERPL-MCNC: 0.73 MG/DL (ref 0.5–0.9)
EOSINOPHILS RELATIVE PERCENT: 6 % (ref 1–4)
GFR SERPL CREATININE-BSD FRML MDRD: >60 ML/MIN/1.73M2
GLUCOSE SERPL-MCNC: 96 MG/DL (ref 70–99)
HCT VFR BLD AUTO: 45.4 % (ref 36–46)
HGB BLD-MCNC: 15.3 G/DL (ref 12–16)
LYMPHOCYTES # BLD: 29 % (ref 24–44)
MCH RBC QN AUTO: 30.9 PG (ref 26–34)
MCHC RBC AUTO-ENTMCNC: 33.6 G/DL (ref 31–37)
MCV RBC AUTO: 91.9 FL (ref 80–100)
MONOCYTES # BLD: 10 % (ref 2–11)
PDW BLD-RTO: 14.5 % (ref 12.5–15.4)
PLATELET # BLD AUTO: 145 K/UL (ref 140–450)
PMV BLD AUTO: 7.4 FL (ref 6–12)
POTASSIUM SERPL-SCNC: 3.9 MMOL/L (ref 3.7–5.3)
PROT SERPL-MCNC: 7.2 G/DL (ref 6.4–8.3)
RBC # BLD: 4.94 M/UL (ref 4–5.2)
SEG NEUTROPHILS: 54 % (ref 36–66)
SEGMENTED NEUTROPHILS ABSOLUTE COUNT: 2.4 K/UL (ref 1.8–7.7)
SODIUM SERPL-SCNC: 143 MMOL/L (ref 135–144)
WBC # BLD AUTO: 4.4 K/UL (ref 3.5–11)

## 2023-03-14 PROCEDURE — 80053 COMPREHEN METABOLIC PANEL: CPT

## 2023-03-14 PROCEDURE — 36415 COLL VENOUS BLD VENIPUNCTURE: CPT

## 2023-03-14 PROCEDURE — 99211 OFF/OP EST MAY X REQ PHY/QHP: CPT | Performed by: INTERNAL MEDICINE

## 2023-03-14 PROCEDURE — 3077F SYST BP >= 140 MM HG: CPT | Performed by: INTERNAL MEDICINE

## 2023-03-14 PROCEDURE — 99213 OFFICE O/P EST LOW 20 MIN: CPT | Performed by: INTERNAL MEDICINE

## 2023-03-14 PROCEDURE — 3079F DIAST BP 80-89 MM HG: CPT | Performed by: INTERNAL MEDICINE

## 2023-03-14 PROCEDURE — 85025 COMPLETE CBC W/AUTO DIFF WBC: CPT

## 2023-03-14 NOTE — PROGRESS NOTES
DIAGNOSIS:   Severe thrombocytopenia, likely ITP with petechia and bruising, no active bleeding, steroid dependent  Hashimoto thyroiditis     CURRENT THERAPY:  Start steroids course (high dose dex)  07/31/2018 - good initial response  Transition to Rituxan, with excellent response, completed September 2018   Relapsed ITP in January 2022, retreatment with rituximab. BRIEF CASE HISTORY:  Laraine Bloch is a very pleasant 62 y.o. female who is referred to us for thrombocytopenia. She recently had CBC via PCP which showed PLT count at 5. She has no other history of lab work. She notes she has petechia ecchymosis which started about a year ago and has been bruising easily over the last weeks. She was on steroids for shoulder dislocation a few moths ago and had GI distress. She reports the bruising started after that. She had hypothyroidism and takes Synthroid. She has not started new medication recently and does not take any blood thinners or pain medication. She has no history of stroke, heart attack, previous blood disorders, diabetes, or cancer. She does not drink alcohol or smoke cigarettes. Started on steroids 07/31/2018 - good initial response. She was found to be steroid dependent, plan for Rituxan. Excellent response. She had persistent bone pain and bone marrow biopsy was done and was negative. Her nephew was diagnosed with ITP 11/2019 at age 55.    she was later diagnosed with hashimoto thyroiditis. The patient was monitored and off treatment for about 3 years but in 2022, her platelets were down to 28 and she required another treatment. She was started again on Rituxan and steroids with good response. She underwent mammogram that was abnormal, Bx was done and showed inflammation, fibroadenoma and calcification. INTERIM HISTORY: The patient presents for follow up with ITP surveillance.  She reports she has been well with no new concerns or complaints, she specifically denies any bleeding or bruising concerns. She is working without any limitation. She is using homeopathic supplement of papaya extract. PAST MEDICAL HISTORY: has a past medical history of Acute ITP ([address] Atrial fibrillation ([address] Hyperthyroidism, and Wears dentures. PAST SURGICAL HISTORY: has a past surgical history that includes  section (); US BREAST BIOPSY W LOC DEVICE 1ST LESION LEFT (Left, 3/18/2022); and US BREAST BIOPSY W LOC DEVICE EACH ADDL LESION LEFT (Left, 3/18/2022). CURRENT MEDICATIONS:  has a current medication list which includes the following prescription(s): omeprazole, hydrochlorothiazide, potassium chloride, levothyroxine, cyanocobalamin, vitamin d-3, zinc, biotin, homeopathic products, and iron. ALLERGIES:  is allergic to bee venom and nuts [peanut-containing drug products]. FAMILY HISTORY: Mother and nephew: colon cancer; Aunt: bladder cancer; mother, aunt, 2 nieces with Hashimoto's; nephew ITP     SOCIAL HISTORY:  reports that she has never smoked. She has never used smokeless tobacco. She reports current alcohol use. She reports that she does not use drugs. REVIEW OF SYSTEMS:   General: No fever or night sweats. Weight is stable. ENT: No double or blurred vision, no tinnitus or hearing problem, no dysphagia or sore throat   Respiratory: No chest pain, no shortness of breath, no cough or hemoptysis. Cardiovascular: Denies chest pain, PND or orthopnea. No L E swelling or palpitations. Gastrointestinal:  No vomiting, diarrhea or constipation, abdominal pain, nausea   Genitourinary: Denies dysuria, hematuria, frequency, urgency or incontinence. Neurological: Denies decreased LOC, no sensory or motor focal deficits, headaches   Musculoskeletal:  No arthralgia no back pain or joint swelling. Skin: There are no rashes or bleeding. Psychiatric:  No anxiety, no depression. Endocrine: No diabetes or thyroid disease. Hematologic: No bleeding, no adenopathy. PHYSICAL EXAM: Shows a well appearing 62y.o.-year-old female who is not in pain or distress. Vital Signs: Blood pressure (!) 148/87, pulse 75, temperature 97.8 °F (36.6 °C), temperature source Oral, resp. rate 16, weight 166 lb 11.2 oz (75.6 kg), not currently breastfeeding. HEENT: Normocephalic and atraumatic. Pupils are equal, round, reactive to light and accommodation. Extraocular muscles are intact. Neck: Showed no JVD, no carotid bruit . Lungs: Clear to auscultation bilaterally. Heart: Regular without any murmur. Abdomen: Soft, mild tenderness LUQ. No hepatosplenomegaly. Extremities: No point tenderness in knee or hip. Lower extremities show no edema, clubbing, or cyanosis. Breasts: Left: no mass appreciated Right: no masses, no lesions Neuro exam: intact cranial nerves bilaterally no motor or sensory deficit, gait is normal. Lymphatic: no adenopathy appreciated in the supraclavicular, axillary, cervical or inguinal area Skin: petechia and bruising; bone marrow site is well healed    REVIEW OF LABORATORY DATA:   Lab Results   Component Value Date    WBC 4.4 03/14/2023    HGB 15.3 03/14/2023    HCT 45.4 03/14/2023    MCV 91.9 03/14/2023     03/14/2023           Chemistry        Component Value Date/Time     03/14/2023 0926    K 3.9 03/14/2023 0926     03/14/2023 0926    CO2 28 03/14/2023 0926    BUN 13 03/14/2023 0926    CREATININE 0.73 03/14/2023 0926        Component Value Date/Time    CALCIUM 10.0 03/14/2023 0926    ALKPHOS 81 03/14/2023 0926    AST 18 03/14/2023 0926    ALT 16 03/14/2023 0926    BILITOT 0.7 03/14/2023 0926          PATHOLOGY:       REVIEW OF RADIOLOGICAL RESULTS:     IMPRESSION:   ITP, steroid refractory  Started 4 day course of steroids - good initial response.   Steroid dependent, plan to transition to Rituxan - excellent response  Persistent bone pain, bone marrow was done, negative, ITP active but controlled  Hashimoto thyroiditis, on synthroid   Left breast mass seen on CT scan, biopsy negative     PLAN:   Patient continues to do well and she is in remission  This is have been stable for extended period of time  Unfortunately she had recurrent ITP so I am expecting her platelets to drop at some point and we will have to retreat her with rituximab  We will continue surveillance unchanged. We will see her every 6 months and do labs every 3.          ANTHONY TODD [facility] MD Raj  Hematologist/Medical Oncologist  [id number] [facility] hematology oncology physicians

## 2023-03-15 ENCOUNTER — TELEPHONE (OUTPATIENT)
Dept: FAMILY MEDICINE CLINIC | Age: 58
End: 2023-03-15

## 2023-05-09 ENCOUNTER — OFFICE VISIT (OUTPATIENT)
Dept: FAMILY MEDICINE CLINIC | Age: 58
End: 2023-05-09
Payer: MEDICAID

## 2023-05-09 VITALS
DIASTOLIC BLOOD PRESSURE: 80 MMHG | RESPIRATION RATE: 16 BRPM | OXYGEN SATURATION: 98 % | WEIGHT: 165.8 LBS | TEMPERATURE: 96.9 F | HEIGHT: 66 IN | SYSTOLIC BLOOD PRESSURE: 130 MMHG | HEART RATE: 69 BPM | BODY MASS INDEX: 26.65 KG/M2

## 2023-05-09 DIAGNOSIS — J30.2 SEASONAL ALLERGIC RHINITIS, UNSPECIFIED TRIGGER: ICD-10-CM

## 2023-05-09 DIAGNOSIS — R92.8 ABNORMAL MAMMOGRAM: ICD-10-CM

## 2023-05-09 DIAGNOSIS — E03.8 HYPOTHYROIDISM DUE TO HASHIMOTO'S THYROIDITIS: ICD-10-CM

## 2023-05-09 DIAGNOSIS — E87.6 HYPOKALEMIA: ICD-10-CM

## 2023-05-09 DIAGNOSIS — Z86.2 HISTORY OF ITP: ICD-10-CM

## 2023-05-09 DIAGNOSIS — I10 ESSENTIAL HYPERTENSION: Primary | ICD-10-CM

## 2023-05-09 DIAGNOSIS — E78.2 MIXED HYPERLIPIDEMIA: ICD-10-CM

## 2023-05-09 DIAGNOSIS — L65.9 HAIR LOSS: ICD-10-CM

## 2023-05-09 DIAGNOSIS — E06.3 HYPOTHYROIDISM DUE TO HASHIMOTO'S THYROIDITIS: ICD-10-CM

## 2023-05-09 PROCEDURE — 3079F DIAST BP 80-89 MM HG: CPT | Performed by: NURSE PRACTITIONER

## 2023-05-09 PROCEDURE — 3075F SYST BP GE 130 - 139MM HG: CPT | Performed by: NURSE PRACTITIONER

## 2023-05-09 PROCEDURE — 99396 PREV VISIT EST AGE 40-64: CPT | Performed by: NURSE PRACTITIONER

## 2023-05-09 RX ORDER — MAGNESIUM GLYCINATE 100 MG
1 CAPSULE ORAL DAILY
Qty: 90 CAPSULE | Refills: 3 | Status: SHIPPED | OUTPATIENT
Start: 2023-05-09 | End: 2024-05-08

## 2023-05-09 RX ORDER — OMEPRAZOLE 20 MG/1
20 CAPSULE, DELAYED RELEASE ORAL DAILY
Qty: 90 CAPSULE | Refills: 3 | Status: SHIPPED | OUTPATIENT
Start: 2023-05-09 | End: 2024-05-08

## 2023-05-09 RX ORDER — POTASSIUM CHLORIDE 750 MG/1
10 CAPSULE, EXTENDED RELEASE ORAL DAILY
Qty: 90 CAPSULE | Refills: 3 | Status: SHIPPED | OUTPATIENT
Start: 2023-05-09 | End: 2024-05-08

## 2023-05-09 RX ORDER — LORATADINE 10 MG/1
10 TABLET ORAL DAILY
Qty: 90 TABLET | Refills: 3 | Status: SHIPPED | OUTPATIENT
Start: 2023-05-09 | End: 2024-05-08

## 2023-05-09 RX ORDER — LEVOTHYROXINE SODIUM 0.07 MG/1
TABLET ORAL
Qty: 90 TABLET | Refills: 3 | Status: SHIPPED | OUTPATIENT
Start: 2023-05-09 | End: 2024-05-07

## 2023-05-09 RX ORDER — HYDROCHLOROTHIAZIDE 25 MG/1
25 TABLET ORAL DAILY
Qty: 90 TABLET | Refills: 3 | Status: SHIPPED | OUTPATIENT
Start: 2023-05-09 | End: 2024-05-08

## 2023-05-09 ASSESSMENT — PATIENT HEALTH QUESTIONNAIRE - PHQ9
SUM OF ALL RESPONSES TO PHQ9 QUESTIONS 1 & 2: 0
2. FEELING DOWN, DEPRESSED OR HOPELESS: 0
10. IF YOU CHECKED OFF ANY PROBLEMS, HOW DIFFICULT HAVE THESE PROBLEMS MADE IT FOR YOU TO DO YOUR WORK, TAKE CARE OF THINGS AT HOME, OR GET ALONG WITH OTHER PEOPLE: 0
SUM OF ALL RESPONSES TO PHQ QUESTIONS 1-9: 6
6. FEELING BAD ABOUT YOURSELF - OR THAT YOU ARE A FAILURE OR HAVE LET YOURSELF OR YOUR FAMILY DOWN: 0
SUM OF ALL RESPONSES TO PHQ QUESTIONS 1-9: 6
1. LITTLE INTEREST OR PLEASURE IN DOING THINGS: 0
SUM OF ALL RESPONSES TO PHQ QUESTIONS 1-9: 6
4. FEELING TIRED OR HAVING LITTLE ENERGY: 1
SUM OF ALL RESPONSES TO PHQ QUESTIONS 1-9: 6
9. THOUGHTS THAT YOU WOULD BE BETTER OFF DEAD, OR OF HURTING YOURSELF: 0
3. TROUBLE FALLING OR STAYING ASLEEP: 3
5. POOR APPETITE OR OVEREATING: 2
7. TROUBLE CONCENTRATING ON THINGS, SUCH AS READING THE NEWSPAPER OR WATCHING TELEVISION: 0
8. MOVING OR SPEAKING SO SLOWLY THAT OTHER PEOPLE COULD HAVE NOTICED. OR THE OPPOSITE, BEING SO FIGETY OR RESTLESS THAT YOU HAVE BEEN MOVING AROUND A LOT MORE THAN USUAL: 0

## 2023-05-09 ASSESSMENT — ENCOUNTER SYMPTOMS
CHEST TIGHTNESS: 0
DIARRHEA: 0
SHORTNESS OF BREATH: 0
BACK PAIN: 0
BLOOD IN STOOL: 0
ABDOMINAL DISTENTION: 0
COUGH: 0
NAUSEA: 0
CONSTIPATION: 0
ABDOMINAL PAIN: 0

## 2023-05-09 ASSESSMENT — ANXIETY QUESTIONNAIRES
6. BECOMING EASILY ANNOYED OR IRRITABLE: 1
5. BEING SO RESTLESS THAT IT IS HARD TO SIT STILL: 0
7. FEELING AFRAID AS IF SOMETHING AWFUL MIGHT HAPPEN: 0
3. WORRYING TOO MUCH ABOUT DIFFERENT THINGS: 0
2. NOT BEING ABLE TO STOP OR CONTROL WORRYING: 1
4. TROUBLE RELAXING: 0
IF YOU CHECKED OFF ANY PROBLEMS ON THIS QUESTIONNAIRE, HOW DIFFICULT HAVE THESE PROBLEMS MADE IT FOR YOU TO DO YOUR WORK, TAKE CARE OF THINGS AT HOME, OR GET ALONG WITH OTHER PEOPLE: NOT DIFFICULT AT ALL
GAD7 TOTAL SCORE: 2
1. FEELING NERVOUS, ANXIOUS, OR ON EDGE: 0

## 2023-05-09 NOTE — PROGRESS NOTES
appetite change, chills and fever. Frustrated not able to loss weight? HENT:  Negative for congestion. Upper and lower dentures    Eyes:         Not seen eye MD in few years. No changes in vision   Respiratory:  Negative for cough, chest tightness and shortness of breath. Cardiovascular:  Negative for chest pain, palpitations and leg swelling. Gastrointestinal:  Negative for abdominal distention, abdominal pain, blood in stool, constipation, diarrhea and nausea. Genitourinary:  Negative for difficulty urinating, dysuria, frequency, pelvic pain and urgency. Postmenopausal    Musculoskeletal:  Positive for arthralgias (generalized). Negative for back pain, gait problem, joint swelling and myalgias. Neurological:  Positive for numbness (bilateral hands, at night ) and headaches (almost daily, no OTC medications. ). Negative for dizziness and light-headedness. Had dizziness with infusions    Hematological:         Follows with hematology    Psychiatric/Behavioral:  Positive for agitation and sleep disturbance. Negative for decreased concentration, dysphoric mood, self-injury and suicidal ideas. The patient is nervous/anxious. Physical Exam:   Vitals:    05/09/23 0829   BP: 130/80   Site: Left Upper Arm   Position: Sitting   Cuff Size: Medium Adult   Pulse: 69   Resp: 16   Temp: 96.9 °F (36.1 °C)   TempSrc: Temporal   SpO2: 98%   Weight: 165 lb 12.8 oz (75.2 kg)   Height: 5' 6\" (1.676 m)     Body mass index is 26.76 kg/m². Physical Exam  Vitals and nursing note reviewed. Constitutional:       General: She is not in acute distress. Appearance: Normal appearance. She is well-developed and well-groomed. She is not ill-appearing or toxic-appearing. Cardiovascular:      Rate and Rhythm: Normal rate and regular rhythm. No extrasystoles are present. Heart sounds: Normal heart sounds, S1 normal and S2 normal. No murmur heard.   Pulmonary:      Effort: Pulmonary effort

## 2023-06-13 ENCOUNTER — HOSPITAL ENCOUNTER (OUTPATIENT)
Age: 58
Discharge: HOME | End: 2023-06-13
Payer: MEDICAID

## 2023-06-13 DIAGNOSIS — D69.3 ACUTE ITP (HCC): ICD-10-CM

## 2023-06-13 DIAGNOSIS — E06.3 HYPOTHYROIDISM DUE TO HASHIMOTO'S THYROIDITIS: ICD-10-CM

## 2023-06-13 DIAGNOSIS — E03.8 HYPOTHYROIDISM DUE TO HASHIMOTO'S THYROIDITIS: ICD-10-CM

## 2023-06-13 DIAGNOSIS — E87.6 HYPOKALEMIA: ICD-10-CM

## 2023-06-13 DIAGNOSIS — E78.2 MIXED HYPERLIPIDEMIA: ICD-10-CM

## 2023-06-13 DIAGNOSIS — I10 ESSENTIAL HYPERTENSION: ICD-10-CM

## 2023-06-13 LAB
ALBUMIN SERPL-MCNC: 4.6 G/DL (ref 3.5–5.2)
ALBUMIN SERPL-MCNC: 4.6 G/DL (ref 3.5–5.2)
ALBUMIN/GLOB SERPL: 1.8 {RATIO} (ref 1–2.5)
ALBUMIN/GLOB SERPL: 1.8 {RATIO} (ref 1–2.5)
ALP SERPL-CCNC: 88 U/L (ref 35–104)
ALP SERPL-CCNC: 88 U/L (ref 35–104)
ALT SERPL-CCNC: 15 U/L (ref 5–33)
ALT SERPL-CCNC: 15 U/L (ref 5–33)
ANION GAP SERPL CALCULATED.3IONS-SCNC: 10 MMOL/L (ref 9–17)
ANION GAP SERPL CALCULATED.3IONS-SCNC: 10 MMOL/L (ref 9–17)
AST SERPL-CCNC: 18 U/L
AST SERPL-CCNC: 18 U/L
BASOPHILS # BLD: 0 K/UL (ref 0–0.2)
BASOPHILS NFR BLD: 1 % (ref 0–2)
BILIRUB SERPL-MCNC: 0.5 MG/DL (ref 0.3–1.2)
BILIRUB SERPL-MCNC: 0.5 MG/DL (ref 0.3–1.2)
BUN SERPL-MCNC: 14 MG/DL (ref 6–20)
BUN SERPL-MCNC: 14 MG/DL (ref 6–20)
CALCIUM SERPL-MCNC: 9.8 MG/DL (ref 8.6–10.4)
CALCIUM SERPL-MCNC: 9.8 MG/DL (ref 8.6–10.4)
CHLORIDE SERPL-SCNC: 101 MMOL/L (ref 98–107)
CHLORIDE SERPL-SCNC: 101 MMOL/L (ref 98–107)
CHOLEST SERPL-MCNC: 230 MG/DL
CHOLESTEROL/HDL RATIO: 3.6
CO2 SERPL-SCNC: 27 MMOL/L (ref 20–31)
CO2 SERPL-SCNC: 27 MMOL/L (ref 20–31)
CREAT SERPL-MCNC: 0.86 MG/DL (ref 0.5–0.9)
CREAT SERPL-MCNC: 0.86 MG/DL (ref 0.5–0.9)
EOSINOPHIL # BLD: 0.2 K/UL (ref 0–0.4)
EOSINOPHILS RELATIVE PERCENT: 5 % (ref 1–4)
ERYTHROCYTE [DISTWIDTH] IN BLOOD BY AUTOMATED COUNT: 13.9 % (ref 12.5–15.4)
GFR SERPL CREATININE-BSD FRML MDRD: >60 ML/MIN/1.73M2
GFR SERPL CREATININE-BSD FRML MDRD: >60 ML/MIN/1.73M2
GLUCOSE P FAST SERPL-MCNC: 102 MG/DL (ref 70–99)
GLUCOSE SERPL-MCNC: 102 MG/DL (ref 70–99)
HCT VFR BLD AUTO: 44.8 % (ref 36–46)
HDLC SERPL-MCNC: 64 MG/DL
HGB BLD-MCNC: 15.3 G/DL (ref 12–16)
LDLC SERPL CALC-MCNC: 149 MG/DL (ref 0–130)
LYMPHOCYTES # BLD: 28 % (ref 24–44)
LYMPHOCYTES NFR BLD: 1.4 K/UL (ref 1–4.8)
MAGNESIUM SERPL-MCNC: 2.2 MG/DL (ref 1.6–2.6)
MCH RBC QN AUTO: 31.1 PG (ref 26–34)
MCHC RBC AUTO-ENTMCNC: 34.1 G/DL (ref 31–37)
MCV RBC AUTO: 91.2 FL (ref 80–100)
MONOCYTES NFR BLD: 0.4 K/UL (ref 0.1–1.2)
MONOCYTES NFR BLD: 8 % (ref 2–11)
NEUTROPHILS NFR BLD: 58 % (ref 36–66)
NEUTS SEG NFR BLD: 2.9 K/UL (ref 1.8–7.7)
PLATELET # BLD AUTO: 166 K/UL (ref 140–450)
PMV BLD AUTO: 7.6 FL (ref 6–12)
POTASSIUM SERPL-SCNC: 4.2 MMOL/L (ref 3.7–5.3)
POTASSIUM SERPL-SCNC: 4.2 MMOL/L (ref 3.7–5.3)
PROT SERPL-MCNC: 7.2 G/DL (ref 6.4–8.3)
PROT SERPL-MCNC: 7.2 G/DL (ref 6.4–8.3)
RBC # BLD AUTO: 4.91 M/UL (ref 4–5.2)
SODIUM SERPL-SCNC: 138 MMOL/L (ref 135–144)
SODIUM SERPL-SCNC: 138 MMOL/L (ref 135–144)
TRIGL SERPL-MCNC: 87 MG/DL
TSH SERPL-MCNC: 3.33 UIU/ML (ref 0.3–5)
WBC OTHER # BLD: 5 K/UL (ref 3.5–11)

## 2023-06-13 PROCEDURE — 84443 ASSAY THYROID STIM HORMONE: CPT

## 2023-06-13 PROCEDURE — 80061 LIPID PANEL: CPT

## 2023-06-13 PROCEDURE — 80053 COMPREHEN METABOLIC PANEL: CPT

## 2023-06-13 PROCEDURE — 83735 ASSAY OF MAGNESIUM: CPT

## 2023-06-13 PROCEDURE — 85027 COMPLETE CBC AUTOMATED: CPT

## 2023-06-13 PROCEDURE — 36415 COLL VENOUS BLD VENIPUNCTURE: CPT

## 2023-06-19 ENCOUNTER — PATIENT MESSAGE (OUTPATIENT)
Dept: FAMILY MEDICINE CLINIC | Age: 58
End: 2023-06-19

## 2023-12-05 ENCOUNTER — OFFICE VISIT (OUTPATIENT)
Dept: ONCOLOGY | Age: 58
End: 2023-12-05
Payer: COMMERCIAL

## 2023-12-05 ENCOUNTER — TELEPHONE (OUTPATIENT)
Dept: ONCOLOGY | Age: 58
End: 2023-12-05

## 2023-12-05 ENCOUNTER — HOSPITAL ENCOUNTER (OUTPATIENT)
Age: 58
Discharge: HOME | End: 2023-12-05
Payer: COMMERCIAL

## 2023-12-05 VITALS
TEMPERATURE: 96.9 F | BODY MASS INDEX: 24.92 KG/M2 | DIASTOLIC BLOOD PRESSURE: 92 MMHG | RESPIRATION RATE: 18 BRPM | HEART RATE: 71 BPM | WEIGHT: 154.4 LBS | OXYGEN SATURATION: 100 % | SYSTOLIC BLOOD PRESSURE: 140 MMHG

## 2023-12-05 DIAGNOSIS — Z86.2 HISTORY OF ITP: ICD-10-CM

## 2023-12-05 DIAGNOSIS — D69.3 ACUTE ITP (HCC): ICD-10-CM

## 2023-12-05 DIAGNOSIS — D69.3 ACUTE ITP (HCC): Primary | ICD-10-CM

## 2023-12-05 LAB
ALBUMIN SERPL-MCNC: 4.6 G/DL (ref 3.5–5.2)
ALBUMIN/GLOB SERPL: 1.8 {RATIO} (ref 1–2.5)
ALP SERPL-CCNC: 81 U/L (ref 35–104)
ALT SERPL-CCNC: 13 U/L (ref 5–33)
ANION GAP SERPL CALCULATED.3IONS-SCNC: 12 MMOL/L (ref 9–17)
AST SERPL-CCNC: 18 U/L
BASOPHILS # BLD: 0 K/UL (ref 0–0.2)
BASOPHILS NFR BLD: 0 % (ref 0–2)
BILIRUB SERPL-MCNC: 0.6 MG/DL (ref 0.3–1.2)
BUN SERPL-MCNC: 15 MG/DL (ref 6–20)
CALCIUM SERPL-MCNC: 10.2 MG/DL (ref 8.6–10.4)
CHLORIDE SERPL-SCNC: 101 MMOL/L (ref 98–107)
CO2 SERPL-SCNC: 29 MMOL/L (ref 20–31)
CREAT SERPL-MCNC: 0.9 MG/DL (ref 0.5–0.9)
EOSINOPHIL # BLD: 0.11 K/UL (ref 0–0.4)
EOSINOPHILS RELATIVE PERCENT: 2 % (ref 1–4)
ERYTHROCYTE [DISTWIDTH] IN BLOOD BY AUTOMATED COUNT: 14.1 % (ref 12.5–15.4)
GFR SERPL CREATININE-BSD FRML MDRD: >60 ML/MIN/1.73M2
GLUCOSE SERPL-MCNC: 93 MG/DL (ref 70–99)
HCT VFR BLD AUTO: 43.5 % (ref 36–46)
HGB BLD-MCNC: 15.3 G/DL (ref 12–16)
LYMPHOCYTES NFR BLD: 2.18 K/UL (ref 1–4.8)
LYMPHOCYTES RELATIVE PERCENT: 39 % (ref 24–44)
MCH RBC QN AUTO: 31.9 PG (ref 26–34)
MCHC RBC AUTO-ENTMCNC: 35.1 G/DL (ref 31–37)
MCV RBC AUTO: 90.9 FL (ref 80–100)
MONOCYTES NFR BLD: 0.17 K/UL (ref 0.1–0.8)
MONOCYTES NFR BLD: 3 % (ref 1–7)
MORPHOLOGY: NORMAL
NEUTROPHILS NFR BLD: 56 % (ref 36–66)
NEUTS SEG NFR BLD: 3.14 K/UL (ref 1.8–7.7)
PLATELET # BLD AUTO: 188 K/UL (ref 140–450)
PMV BLD AUTO: 7.5 FL (ref 6–12)
POTASSIUM SERPL-SCNC: 3.6 MMOL/L (ref 3.7–5.3)
PROT SERPL-MCNC: 7.2 G/DL (ref 6.4–8.3)
RBC # BLD AUTO: 4.79 M/UL (ref 4–5.2)
SODIUM SERPL-SCNC: 142 MMOL/L (ref 135–144)
WBC OTHER # BLD: 5.6 K/UL (ref 3.5–11)

## 2023-12-05 PROCEDURE — G8484 FLU IMMUNIZE NO ADMIN: HCPCS | Performed by: INTERNAL MEDICINE

## 2023-12-05 PROCEDURE — G8420 CALC BMI NORM PARAMETERS: HCPCS | Performed by: INTERNAL MEDICINE

## 2023-12-05 PROCEDURE — 99213 OFFICE O/P EST LOW 20 MIN: CPT | Performed by: INTERNAL MEDICINE

## 2023-12-05 PROCEDURE — 3077F SYST BP >= 140 MM HG: CPT | Performed by: INTERNAL MEDICINE

## 2023-12-05 PROCEDURE — 3017F COLORECTAL CA SCREEN DOC REV: CPT | Performed by: INTERNAL MEDICINE

## 2023-12-05 PROCEDURE — 99211 OFF/OP EST MAY X REQ PHY/QHP: CPT | Performed by: INTERNAL MEDICINE

## 2023-12-05 PROCEDURE — G8427 DOCREV CUR MEDS BY ELIG CLIN: HCPCS | Performed by: INTERNAL MEDICINE

## 2023-12-05 PROCEDURE — 80053 COMPREHEN METABOLIC PANEL: CPT

## 2023-12-05 PROCEDURE — 1036F TOBACCO NON-USER: CPT | Performed by: INTERNAL MEDICINE

## 2023-12-05 PROCEDURE — 85025 COMPLETE CBC W/AUTO DIFF WBC: CPT

## 2023-12-05 PROCEDURE — 36415 COLL VENOUS BLD VENIPUNCTURE: CPT

## 2023-12-05 PROCEDURE — 3079F DIAST BP 80-89 MM HG: CPT | Performed by: INTERNAL MEDICINE

## 2023-12-05 NOTE — TELEPHONE ENCOUNTER
RV in 6 months, with cbc, cmp       Draw cbc, cmp at md visit    Rv scheduled for 6/4/24 @ 10:00am    PT was given AVS and appt schedule    Electronically signed by Madeline Ribera on 12/5/2023 at 10:01 AM

## 2023-12-05 NOTE — PROGRESS NOTES
DIAGNOSIS:   Severe thrombocytopenia, likely ITP with petechia and bruising, no active bleeding, steroid dependent  Hashimoto thyroiditis     CURRENT THERAPY:  Start steroids course (high dose dex)  07/31/2018 - good initial response  Transition to Rituxan, with excellent response, completed September 2018   Relapsed ITP in January 2022, retreatment with rituximab. BRIEF CASE HISTORY:  Johann Torres is a very pleasant 62 y.o. female who is referred to us for thrombocytopenia. She recently had CBC via PCP which showed PLT count at 5. She has no other history of lab work. She notes she has petechia ecchymosis which started about a year ago and has been bruising easily over the last weeks. She was on steroids for shoulder dislocation a few moths ago and had GI distress. She reports the bruising started after that. She had hypothyroidism and takes Synthroid. She has not started new medication recently and does not take any blood thinners or pain medication. She has no history of stroke, heart attack, previous blood disorders, diabetes, or cancer. She does not drink alcohol or smoke cigarettes. Started on steroids 07/31/2018 - good initial response. She was found to be steroid dependent, plan for Rituxan. Excellent response. She had persistent bone pain and bone marrow biopsy was done and was negative. Her nephew was diagnosed with ITP 11/2019 at age 55.    she was later diagnosed with hashimoto thyroiditis. The patient was monitored and off treatment for about 3 years but in 2022, her platelets were down to 28 and she required another treatment. She was started again on Rituxan and steroids with good response. She underwent mammogram that was abnormal, Bx was done and showed inflammation, fibroadenoma and calcification. INTERIM HISTORY: The patient presents for follow up with ITP surveillance.  She reports she has been well with no new concerns or complaints, she specifically denies any

## 2024-03-05 NOTE — TELEPHONE ENCOUNTER
Called pt and advised we do have pulmonology encounter through care everywhere   Patient did not follow up at the beginning of March so we could discuss her Synthroid dosing further. .. I do not see any documentation that this was done. I would like her to complete pending orders for thyroid levels. I will reorder patient's Synthroid with proper dosing when the labs are complete. Can we ask her if she still holding her synthroid dose as recommended end of February due to [facility] being low?

## 2024-04-12 DIAGNOSIS — I10 ESSENTIAL HYPERTENSION: ICD-10-CM

## 2024-04-14 RX ORDER — HYDROCHLOROTHIAZIDE 25 MG/1
25 TABLET ORAL DAILY
Qty: 30 TABLET | Refills: 1 | Status: SHIPPED | OUTPATIENT
Start: 2024-04-14 | End: 2024-06-13

## 2024-04-17 DIAGNOSIS — E03.8 HYPOTHYROIDISM DUE TO HASHIMOTO'S THYROIDITIS: ICD-10-CM

## 2024-04-17 DIAGNOSIS — L65.9 HAIR LOSS: ICD-10-CM

## 2024-04-17 DIAGNOSIS — Z86.2 HISTORY OF ITP: ICD-10-CM

## 2024-04-17 DIAGNOSIS — E87.6 HYPOKALEMIA: ICD-10-CM

## 2024-04-17 DIAGNOSIS — E06.3 HYPOTHYROIDISM DUE TO HASHIMOTO'S THYROIDITIS: ICD-10-CM

## 2024-04-17 NOTE — TELEPHONE ENCOUNTER
LOV 5/9/23  RTO 6 months; LVM for patient to schedule F/U  LRF 5/9/23    Health Maintenance   Topic Date Due    Hepatitis B vaccine (1 of 3 - 3-dose series) Never done    COVID-19 Vaccine (1) Never done    Cervical cancer screen  Never done    Shingles vaccine (1 of 2) Never done    Colorectal Cancer Screen  07/30/2019    Breast cancer screen  02/04/2023    DTaP/Tdap/Td vaccine (2 - Td or Tdap) 10/31/2023    Depression Screen  05/09/2024    Flu vaccine (Season Ended) 08/01/2024    Lipids  06/13/2028    Hepatitis C screen  Completed    HIV screen  Completed    Hepatitis A vaccine  Aged Out    Hib vaccine  Aged Out    Polio vaccine  Aged Out    Meningococcal (ACWY) vaccine  Aged Out    Pneumococcal 0-64 years Vaccine  Aged Out    Diabetes screen  Discontinued             (applicable per patient's age: Cancer Screenings, Depression Screening, Fall Risk Screening, Immunizations)    Hemoglobin A1C (%)   Date Value   04/26/2022 5.2     LDL Cholesterol (mg/dL)   Date Value   06/13/2023 149 (H)     AST (U/L)   Date Value   12/05/2023 18     ALT (U/L)   Date Value   12/05/2023 13     BUN (mg/dL)   Date Value   12/05/2023 15      (goal A1C is < 7)   (goal LDL is <100) need 30-50% reduction from baseline     BP Readings from Last 3 Encounters:   12/05/23 (!) 140/92   05/09/23 130/80   03/14/23 (!) 148/87    (goal /80)      All Future Testing planned in [facility]:  Lab Frequency Next Occurrence   HENRIQUE DIGITAL DIAGNOSTIC W OR WO CAD BILATERAL Once 05/09/2023   CBC with Auto Differential     Comprehensive Metabolic Panel         Next Visit Date:  Future Appointments   Date Time Provider Department Center   6/4/2024 10:00 AM Karen Anthony MD [facility]            Patient Active Problem List:     Chronic non-seasonal allergic rhinitis     Wears dentures     Family history of breast cancer in sister     Abnormal bruising     Acute ITP (HCC)     Thyroid enlargement     History of ITP     Mixed hyperlipidemia

## 2024-04-19 RX ORDER — LEVOTHYROXINE SODIUM 0.07 MG/1
TABLET ORAL
Qty: 30 TABLET | Refills: 1 | Status: SHIPPED | OUTPATIENT
Start: 2024-04-19 | End: 2025-04-17

## 2024-04-19 RX ORDER — OMEPRAZOLE 20 MG/1
20 CAPSULE, DELAYED RELEASE ORAL DAILY
Qty: 30 CAPSULE | Refills: 1 | Status: SHIPPED | OUTPATIENT
Start: 2024-04-19

## 2024-04-19 RX ORDER — POTASSIUM CHLORIDE 750 MG/1
10 CAPSULE, EXTENDED RELEASE ORAL DAILY
Qty: 30 CAPSULE | Refills: 1 | Status: SHIPPED | OUTPATIENT
Start: 2024-04-19

## 2024-06-04 ENCOUNTER — HOSPITAL ENCOUNTER (OUTPATIENT)
Age: 59
Discharge: HOME | End: 2024-06-04
Payer: COMMERCIAL

## 2024-06-04 ENCOUNTER — OFFICE VISIT (OUTPATIENT)
Dept: ONCOLOGY | Age: 59
End: 2024-06-04
Payer: COMMERCIAL

## 2024-06-04 VITALS
RESPIRATION RATE: 18 BRPM | WEIGHT: 165.4 LBS | TEMPERATURE: 97.4 F | OXYGEN SATURATION: 100 % | SYSTOLIC BLOOD PRESSURE: 152 MMHG | HEART RATE: 59 BPM | BODY MASS INDEX: 26.7 KG/M2 | DIASTOLIC BLOOD PRESSURE: 83 MMHG

## 2024-06-04 DIAGNOSIS — D69.3 CHRONIC ITP (IDIOPATHIC THROMBOCYTOPENIA) (HCC): ICD-10-CM

## 2024-06-04 DIAGNOSIS — Z86.2 HISTORY OF ITP: ICD-10-CM

## 2024-06-04 DIAGNOSIS — D69.3 ACUTE ITP (HCC): Primary | ICD-10-CM

## 2024-06-04 DIAGNOSIS — D69.3 ACUTE ITP (HCC): ICD-10-CM

## 2024-06-04 LAB
ALBUMIN SERPL-MCNC: 4.6 G/DL (ref 3.5–5.2)
ALBUMIN/GLOB SERPL: 1.5 {RATIO} (ref 1–2.5)
ALP SERPL-CCNC: 85 U/L (ref 35–104)
ALT SERPL-CCNC: 18 U/L (ref 5–33)
ANION GAP SERPL CALCULATED.3IONS-SCNC: 10 MMOL/L (ref 9–17)
AST SERPL-CCNC: 21 U/L
BASOPHILS # BLD: 0.1 K/UL (ref 0–0.2)
BASOPHILS NFR BLD: 1 % (ref 0–2)
BILIRUB SERPL-MCNC: 0.5 MG/DL (ref 0.3–1.2)
BUN SERPL-MCNC: 17 MG/DL (ref 6–20)
CALCIUM SERPL-MCNC: 9.9 MG/DL (ref 8.6–10.4)
CHLORIDE SERPL-SCNC: 103 MMOL/L (ref 98–107)
CO2 SERPL-SCNC: 27 MMOL/L (ref 20–31)
CREAT SERPL-MCNC: 0.8 MG/DL (ref 0.5–0.9)
EOSINOPHIL # BLD: 0.2 K/UL (ref 0–0.4)
EOSINOPHILS RELATIVE PERCENT: 4 % (ref 1–4)
ERYTHROCYTE [DISTWIDTH] IN BLOOD BY AUTOMATED COUNT: 14.7 % (ref 12.5–15.4)
GFR, ESTIMATED: 85 ML/MIN/1.73M2
GLUCOSE SERPL-MCNC: 99 MG/DL (ref 70–99)
HCT VFR BLD AUTO: 43.2 % (ref 36–46)
HGB BLD-MCNC: 15.1 G/DL (ref 12–16)
LYMPHOCYTES NFR BLD: 1.7 K/UL (ref 1–4.8)
LYMPHOCYTES RELATIVE PERCENT: 28 % (ref 24–44)
MCH RBC QN AUTO: 32.2 PG (ref 26–34)
MCHC RBC AUTO-ENTMCNC: 34.8 G/DL (ref 31–37)
MCV RBC AUTO: 92.3 FL (ref 80–100)
MONOCYTES NFR BLD: 0.5 K/UL (ref 0.1–1.2)
MONOCYTES NFR BLD: 8 % (ref 2–11)
NEUTROPHILS NFR BLD: 59 % (ref 36–66)
NEUTS SEG NFR BLD: 3.6 K/UL (ref 1.8–7.7)
PLATELET # BLD AUTO: 211 K/UL (ref 140–450)
PMV BLD AUTO: 7.3 FL (ref 6–12)
POTASSIUM SERPL-SCNC: 4.3 MMOL/L (ref 3.7–5.3)
PROT SERPL-MCNC: 7.7 G/DL (ref 6.4–8.3)
RBC # BLD AUTO: 4.68 M/UL (ref 4–5.2)
SODIUM SERPL-SCNC: 140 MMOL/L (ref 135–144)
WBC OTHER # BLD: 6.1 K/UL (ref 3.5–11)

## 2024-06-04 PROCEDURE — 99211 OFF/OP EST MAY X REQ PHY/QHP: CPT | Performed by: INTERNAL MEDICINE

## 2024-06-04 PROCEDURE — 3077F SYST BP >= 140 MM HG: CPT | Performed by: INTERNAL MEDICINE

## 2024-06-04 PROCEDURE — 99213 OFFICE O/P EST LOW 20 MIN: CPT | Performed by: INTERNAL MEDICINE

## 2024-06-04 PROCEDURE — G8419 CALC BMI OUT NRM PARAM NOF/U: HCPCS | Performed by: INTERNAL MEDICINE

## 2024-06-04 PROCEDURE — 3017F COLORECTAL CA SCREEN DOC REV: CPT | Performed by: INTERNAL MEDICINE

## 2024-06-04 PROCEDURE — G8427 DOCREV CUR MEDS BY ELIG CLIN: HCPCS | Performed by: INTERNAL MEDICINE

## 2024-06-04 PROCEDURE — 1036F TOBACCO NON-USER: CPT | Performed by: INTERNAL MEDICINE

## 2024-06-04 PROCEDURE — 36415 COLL VENOUS BLD VENIPUNCTURE: CPT

## 2024-06-04 PROCEDURE — 80053 COMPREHEN METABOLIC PANEL: CPT

## 2024-06-04 PROCEDURE — 3079F DIAST BP 80-89 MM HG: CPT | Performed by: INTERNAL MEDICINE

## 2024-06-04 PROCEDURE — 85025 COMPLETE CBC W/AUTO DIFF WBC: CPT

## 2024-06-04 NOTE — PROGRESS NOTES
DIAGNOSIS:   Severe thrombocytopenia, likely ITP with petechia and bruising, no active bleeding, steroid dependent  Hashimoto thyroiditis     CURRENT THERAPY:  Start steroids course (high dose dex)  07/31/2018 - good initial response  Transition to Rituxan, with excellent response, completed September 2018   Relapsed ITP in January 2022, retreatment with rituximab.      BRIEF CASE HISTORY:  Rola Schuster is a very pleasant 58 y.o. female who is referred to us for thrombocytopenia. She recently had CBC via PCP which showed PLT count at 5. She has no other history of lab work. She notes she has petechia ecchymosis which started about a year ago and has been bruising easily over the last weeks. She was on steroids for shoulder dislocation a few moths ago and had GI distress. She reports the bruising started after that.   She had hypothyroidism and takes Synthroid. She has not started new medication recently and does not take any blood thinners or pain medication.   She has no history of stroke, heart attack, previous blood disorders, diabetes, or cancer.   She does not drink alcohol or smoke cigarettes.   Started on steroids 07/31/2018 - good initial response. She was found to be steroid dependent, plan for Rituxan. Excellent response. She had persistent bone pain and bone marrow biopsy was done and was negative.   Her nephew was diagnosed with ITP 11/2019 at age 46.    she was later diagnosed with hashimoto thyroiditis.   The patient was monitored and off treatment for about 3 years but in 2022, her platelets were down to 28 and she required another treatment.  She was started again on Rituxan and steroids with good response. She underwent mammogram that was abnormal, Bx was done and showed inflammation, fibroadenoma and calcification.     INTERIM HISTORY: The patient presents for follow up with ITP surveillance. She reports she has been well with no new concerns or complaints, she specifically denies any

## 2024-06-13 DIAGNOSIS — I10 ESSENTIAL HYPERTENSION: ICD-10-CM

## 2024-06-13 RX ORDER — HYDROCHLOROTHIAZIDE 25 MG/1
25 TABLET ORAL DAILY
Qty: 30 TABLET | Refills: 1 | Status: SHIPPED | OUTPATIENT
Start: 2024-06-13

## 2024-06-13 NOTE — TELEPHONE ENCOUNTER
LOV 5/9/23  RTO 8/6/24  LRF 4/14/24          Controlled Substance Monitoring:    Acute and Chronic Pain Monitoring:        No data to display

## 2024-07-13 DIAGNOSIS — I10 ESSENTIAL HYPERTENSION: ICD-10-CM

## 2024-07-13 DIAGNOSIS — Z86.2 HISTORY OF ITP: ICD-10-CM

## 2024-07-15 RX ORDER — OMEPRAZOLE 20 MG/1
20 CAPSULE, DELAYED RELEASE ORAL DAILY
Qty: 30 CAPSULE | Refills: 2 | Status: SHIPPED | OUTPATIENT
Start: 2024-07-15 | End: 2024-10-13

## 2024-07-15 RX ORDER — HYDROCHLOROTHIAZIDE 25 MG/1
25 TABLET ORAL DAILY
Qty: 30 TABLET | Refills: 2 | Status: SHIPPED | OUTPATIENT
Start: 2024-07-15 | End: 2024-10-13

## 2024-07-15 NOTE — TELEPHONE ENCOUNTER
LOV 5/9/23  RTO 6 months; F/U scheduled  LRF 6/13/24 and 4/19/24    Health Maintenance   Topic Date Due    Hepatitis B vaccine (1 of 3 - 3-dose series) Never done    COVID-19 Vaccine (1) Never done    Cervical cancer screen  Never done    Shingles vaccine (1 of 2) Never done    Colorectal Cancer Screen  07/30/2019    Breast cancer screen  02/04/2023    DTaP/Tdap/Td vaccine (2 - Td or Tdap) 10/31/2023    Depression Screen  05/09/2024    Flu vaccine (1) 08/01/2024    Lipids  06/13/2028    Hepatitis C screen  Completed    HIV screen  Completed    Hepatitis A vaccine  Aged Out    Hib vaccine  Aged Out    Polio vaccine  Aged Out    Meningococcal (ACWY) vaccine  Aged Out    Pneumococcal 0-64 years Vaccine  Aged Out    Diabetes screen  Discontinued             (applicable per patient's age: Cancer Screenings, Depression Screening, Fall Risk Screening, Immunizations)    Hemoglobin A1C (%)   Date Value   04/26/2022 5.2     AST (U/L)   Date Value   06/04/2024 21     ALT (U/L)   Date Value   06/04/2024 18     BUN (mg/dL)   Date Value   06/04/2024 17      (goal A1C is < 7)   (goal LDL is <100) need 30-50% reduction from baseline     BP Readings from Last 3 Encounters:   06/04/24 (!) 152/83   12/05/23 (!) 140/92   05/09/23 130/80    (goal /80)      All Future Testing planned in [facility]:  Lab Frequency Next Occurrence   CBC with Auto Differential     Comprehensive Metabolic Panel     CBC with Auto Differential     Comprehensive Metabolic Panel         Next Visit Date:  Future Appointments   Date Time Provider Department Center   8/6/2024  8:00 AM Sue Baker, APRN - CNP [facility]   12/3/2024 10:30 AM Karen Anthony MD [facility]            Patient Active Problem List:     Chronic non-seasonal allergic rhinitis     Wears dentures     Family history of breast cancer in sister     Abnormal bruising     Acute ITP (HCC)     Thyroid enlargement     History of ITP     Mixed hyperlipidemia

## 2024-07-22 DIAGNOSIS — Z86.2 HISTORY OF ITP: ICD-10-CM

## 2024-08-06 ENCOUNTER — HOSPITAL ENCOUNTER (OUTPATIENT)
Age: 59
Setting detail: SPECIMEN
Discharge: HOME | End: 2024-08-06

## 2024-08-06 ENCOUNTER — OFFICE VISIT (OUTPATIENT)
Dept: FAMILY MEDICINE CLINIC | Age: 59
End: 2024-08-06
Payer: COMMERCIAL

## 2024-08-06 VITALS
DIASTOLIC BLOOD PRESSURE: 78 MMHG | HEIGHT: 66 IN | HEART RATE: 70 BPM | OXYGEN SATURATION: 98 % | BODY MASS INDEX: 27.23 KG/M2 | TEMPERATURE: 97 F | WEIGHT: 169.4 LBS | SYSTOLIC BLOOD PRESSURE: 128 MMHG | RESPIRATION RATE: 16 BRPM

## 2024-08-06 DIAGNOSIS — Z00.00 ANNUAL PHYSICAL EXAM: Primary | ICD-10-CM

## 2024-08-06 DIAGNOSIS — Z12.31 ENCOUNTER FOR SCREENING MAMMOGRAM FOR BREAST CANCER: ICD-10-CM

## 2024-08-06 DIAGNOSIS — E06.3 HYPOTHYROIDISM DUE TO HASHIMOTO'S THYROIDITIS: ICD-10-CM

## 2024-08-06 DIAGNOSIS — R63.5 WEIGHT GAIN: ICD-10-CM

## 2024-08-06 DIAGNOSIS — E04.9 THYROID ENLARGEMENT: ICD-10-CM

## 2024-08-06 DIAGNOSIS — E87.6 HYPOKALEMIA: ICD-10-CM

## 2024-08-06 DIAGNOSIS — E03.8 HYPOTHYROIDISM DUE TO HASHIMOTO'S THYROIDITIS: ICD-10-CM

## 2024-08-06 DIAGNOSIS — E78.2 MIXED HYPERLIPIDEMIA: ICD-10-CM

## 2024-08-06 DIAGNOSIS — I10 ESSENTIAL HYPERTENSION: ICD-10-CM

## 2024-08-06 DIAGNOSIS — Z13.31 SCREENING FOR DEPRESSION: ICD-10-CM

## 2024-08-06 LAB
CHOLEST SERPL-MCNC: 252 MG/DL (ref 0–199)
CHOLESTEROL/HDL RATIO: 4
CORTIS SERPL-MCNC: 9.5 UG/DL (ref 2.5–19.5)
CORTISOL COLLECTION INFO: NORMAL
HDLC SERPL-MCNC: 67 MG/DL
LDLC SERPL CALC-MCNC: 163 MG/DL (ref 0–100)
T3FREE SERPL-MCNC: 3 PG/ML (ref 2–4.4)
T4 FREE SERPL-MCNC: 1.5 NG/DL (ref 0.92–1.68)
TRIGL SERPL-MCNC: 111 MG/DL
TSH SERPL DL<=0.05 MIU/L-ACNC: 6.09 UIU/ML (ref 0.27–4.2)
VLDLC SERPL CALC-MCNC: 22 MG/DL

## 2024-08-06 PROCEDURE — 3078F DIAST BP <80 MM HG: CPT | Performed by: NURSE PRACTITIONER

## 2024-08-06 PROCEDURE — 99396 PREV VISIT EST AGE 40-64: CPT | Performed by: NURSE PRACTITIONER

## 2024-08-06 PROCEDURE — 96127 BRIEF EMOTIONAL/BEHAV ASSMT: CPT | Performed by: NURSE PRACTITIONER

## 2024-08-06 PROCEDURE — 3074F SYST BP LT 130 MM HG: CPT | Performed by: NURSE PRACTITIONER

## 2024-08-06 RX ORDER — LEVOTHYROXINE SODIUM 0.07 MG/1
75 TABLET ORAL DAILY
Qty: 90 TABLET | Refills: 3 | Status: SHIPPED | OUTPATIENT
Start: 2024-08-06 | End: 2025-08-06

## 2024-08-06 RX ORDER — HYDROCHLOROTHIAZIDE 25 MG/1
25 TABLET ORAL DAILY
Qty: 90 TABLET | Refills: 3 | Status: SHIPPED | OUTPATIENT
Start: 2024-08-06 | End: 2025-08-06

## 2024-08-06 RX ORDER — POTASSIUM CHLORIDE 750 MG/1
10 CAPSULE, EXTENDED RELEASE ORAL DAILY
Qty: 90 CAPSULE | Refills: 3 | Status: SHIPPED | OUTPATIENT
Start: 2024-08-06 | End: 2025-08-06

## 2024-08-06 ASSESSMENT — PATIENT HEALTH QUESTIONNAIRE - PHQ9
SUM OF ALL RESPONSES TO PHQ QUESTIONS 1-9: 1
SUM OF ALL RESPONSES TO PHQ9 QUESTIONS 1 & 2: 1
SUM OF ALL RESPONSES TO PHQ QUESTIONS 1-9: 1
2. FEELING DOWN, DEPRESSED OR HOPELESS: SEVERAL DAYS
1. LITTLE INTEREST OR PLEASURE IN DOING THINGS: NOT AT ALL
SUM OF ALL RESPONSES TO PHQ QUESTIONS 1-9: 1
SUM OF ALL RESPONSES TO PHQ QUESTIONS 1-9: 1

## 2024-08-06 ASSESSMENT — ENCOUNTER SYMPTOMS
ABDOMINAL DISTENTION: 0
BLOOD IN STOOL: 0
NAUSEA: 0
DIARRHEA: 0
ABDOMINAL PAIN: 0
SHORTNESS OF BREATH: 0
BACK PAIN: 0
CONSTIPATION: 0
COUGH: 0
CHEST TIGHTNESS: 0

## 2024-08-06 ASSESSMENT — VISUAL ACUITY: OU: 1

## 2024-09-03 DIAGNOSIS — E06.3 HYPOTHYROIDISM DUE TO HASHIMOTO'S THYROIDITIS: Primary | ICD-10-CM

## 2024-09-03 DIAGNOSIS — E03.8 HYPOTHYROIDISM DUE TO HASHIMOTO'S THYROIDITIS: Primary | ICD-10-CM

## 2024-09-24 ENCOUNTER — HOSPITAL ENCOUNTER (OUTPATIENT)
Age: 59
Discharge: HOME | End: 2024-09-24
Payer: COMMERCIAL

## 2024-09-24 DIAGNOSIS — Z86.2 HISTORY OF ITP: ICD-10-CM

## 2024-09-24 DIAGNOSIS — D69.3 ACUTE ITP (HCC): ICD-10-CM

## 2024-09-24 LAB
ALBUMIN SERPL-MCNC: 4.5 G/DL (ref 3.5–5.2)
ALBUMIN/GLOB SERPL: 1.5 {RATIO} (ref 1–2.5)
ALP SERPL-CCNC: 77 U/L (ref 35–104)
ALT SERPL-CCNC: 17 U/L (ref 5–33)
ANION GAP SERPL CALCULATED.3IONS-SCNC: 11 MMOL/L (ref 9–17)
AST SERPL-CCNC: 19 U/L
BASOPHILS # BLD: 0 K/UL (ref 0–0.2)
BASOPHILS NFR BLD: 1 % (ref 0–2)
BILIRUB SERPL-MCNC: 0.4 MG/DL (ref 0.3–1.2)
BUN SERPL-MCNC: 14 MG/DL (ref 6–20)
CALCIUM SERPL-MCNC: 9.6 MG/DL (ref 8.6–10.4)
CHLORIDE SERPL-SCNC: 103 MMOL/L (ref 98–107)
CO2 SERPL-SCNC: 25 MMOL/L (ref 20–31)
CREAT SERPL-MCNC: 0.9 MG/DL (ref 0.5–0.9)
EOSINOPHIL # BLD: 0.3 K/UL (ref 0–0.4)
EOSINOPHILS RELATIVE PERCENT: 5 % (ref 1–4)
ERYTHROCYTE [DISTWIDTH] IN BLOOD BY AUTOMATED COUNT: 13.8 % (ref 12.5–15.4)
GFR, ESTIMATED: 74 ML/MIN/1.73M2
GLUCOSE SERPL-MCNC: 93 MG/DL (ref 70–99)
HCT VFR BLD AUTO: 42.9 % (ref 36–46)
HGB BLD-MCNC: 14.6 G/DL (ref 12–16)
LYMPHOCYTES NFR BLD: 2 K/UL (ref 1–4.8)
LYMPHOCYTES RELATIVE PERCENT: 35 % (ref 24–44)
MCH RBC QN AUTO: 31.7 PG (ref 26–34)
MCHC RBC AUTO-ENTMCNC: 34.2 G/DL (ref 31–37)
MCV RBC AUTO: 92.8 FL (ref 80–100)
MONOCYTES NFR BLD: 0.5 K/UL (ref 0.1–1.2)
MONOCYTES NFR BLD: 8 % (ref 2–11)
NEUTROPHILS NFR BLD: 51 % (ref 36–66)
NEUTS SEG NFR BLD: 3.1 K/UL (ref 1.8–7.7)
PLATELET # BLD AUTO: 199 K/UL (ref 140–450)
PMV BLD AUTO: 7.3 FL (ref 6–12)
POTASSIUM SERPL-SCNC: 4.3 MMOL/L (ref 3.7–5.3)
PROT SERPL-MCNC: 7.5 G/DL (ref 6.4–8.3)
RBC # BLD AUTO: 4.62 M/UL (ref 4–5.2)
SODIUM SERPL-SCNC: 139 MMOL/L (ref 135–144)
WBC OTHER # BLD: 5.9 K/UL (ref 3.5–11)

## 2024-09-24 PROCEDURE — 85025 COMPLETE CBC W/AUTO DIFF WBC: CPT

## 2024-09-24 PROCEDURE — 36415 COLL VENOUS BLD VENIPUNCTURE: CPT

## 2024-09-24 PROCEDURE — 80053 COMPREHEN METABOLIC PANEL: CPT

## 2024-10-19 DIAGNOSIS — Z86.2 HISTORY OF ITP: ICD-10-CM

## 2024-10-20 DIAGNOSIS — Z86.2 HISTORY OF ITP: ICD-10-CM

## 2024-10-21 NOTE — TELEPHONE ENCOUNTER
LOV 8/6/24  RTO 1 year  LRF 7/15/24    Health Maintenance   Topic Date Due    Hepatitis B vaccine (1 of 3 - 19+ 3-dose series) Never done    Cervical cancer screen  Never done    Shingles vaccine (1 of 2) Never done    Colorectal Cancer Screen  07/30/2019    Breast cancer screen  02/04/2023    DTaP/Tdap/Td vaccine (2 - Td or Tdap) 10/31/2023    Flu vaccine (1) 08/01/2024    COVID-19 Vaccine (1 - 2023-24 season) Never done    Depression Screen  08/06/2025    Lipids  08/06/2029    Hepatitis C screen  Completed    HIV screen  Completed    Hepatitis A vaccine  Aged Out    Hib vaccine  Aged Out    Polio vaccine  Aged Out    Meningococcal (ACWY) vaccine  Aged Out    Pneumococcal 0-64 years Vaccine  Aged Out    Diabetes screen  Discontinued             (applicable per patient's age: Cancer Screenings, Depression Screening, Fall Risk Screening, Immunizations)    Hemoglobin A1C (%)   Date Value   04/26/2022 5.2     AST (U/L)   Date Value   09/24/2024 19     ALT (U/L)   Date Value   09/24/2024 17     BUN (mg/dL)   Date Value   09/24/2024 14      (goal A1C is < 7)   (goal LDL is <100) need 30-50% reduction from baseline     BP Readings from Last 3 Encounters:   08/06/24 128/78   06/04/24 (!) 152/83   12/05/23 (!) 140/92    (goal /80)      All Future Testing planned in [facility]:  Lab Frequency Next Occurrence   HENRIQUE DIGITAL SCREEN W OR WO CAD BILATERAL Once 08/06/2024   TSH Once 12/03/2024   T4, Free Once 12/03/2024   T3, Free Once 12/03/2024   CBC with Auto Differential     Comprehensive Metabolic Panel     CBC with Auto Differential     Comprehensive Metabolic Panel         Next Visit Date:  Future Appointments   Date Time Provider Department Center   12/3/2024 10:30 AM Karen Anthony MD [facility]            Patient Active Problem List:     Chronic non-seasonal allergic rhinitis     Wears dentures     Family history of breast cancer in sister     Abnormal bruising     Acute ITP (HCC)     Thyroid

## 2024-10-24 DIAGNOSIS — Z86.2 HISTORY OF ITP: ICD-10-CM

## 2024-12-03 ENCOUNTER — OFFICE VISIT (OUTPATIENT)
Dept: ONCOLOGY | Age: 59
End: 2024-12-03
Payer: COMMERCIAL

## 2024-12-03 ENCOUNTER — HOSPITAL ENCOUNTER (OUTPATIENT)
Age: 59
Discharge: HOME | End: 2024-12-03
Payer: COMMERCIAL

## 2024-12-03 ENCOUNTER — TELEPHONE (OUTPATIENT)
Dept: ONCOLOGY | Age: 59
End: 2024-12-03

## 2024-12-03 VITALS
SYSTOLIC BLOOD PRESSURE: 168 MMHG | TEMPERATURE: 96.8 F | DIASTOLIC BLOOD PRESSURE: 88 MMHG | HEART RATE: 62 BPM | RESPIRATION RATE: 18 BRPM | WEIGHT: 173.1 LBS | BODY MASS INDEX: 27.94 KG/M2 | OXYGEN SATURATION: 98 %

## 2024-12-03 DIAGNOSIS — R07.89 OTHER CHEST PAIN: ICD-10-CM

## 2024-12-03 DIAGNOSIS — D69.3 ACUTE ITP (HCC): ICD-10-CM

## 2024-12-03 DIAGNOSIS — E06.3 HYPOTHYROIDISM DUE TO HASHIMOTO'S THYROIDITIS: ICD-10-CM

## 2024-12-03 DIAGNOSIS — Z86.2 HISTORY OF ITP: ICD-10-CM

## 2024-12-03 DIAGNOSIS — D69.3 ACUTE ITP (HCC): Primary | ICD-10-CM

## 2024-12-03 LAB
ALBUMIN SERPL-MCNC: 4.8 G/DL (ref 3.5–5.2)
ALBUMIN/GLOB SERPL: 1.7 {RATIO} (ref 1–2.5)
ALP SERPL-CCNC: 89 U/L (ref 35–104)
ALT SERPL-CCNC: 20 U/L (ref 5–33)
ANION GAP SERPL CALCULATED.3IONS-SCNC: 11 MMOL/L (ref 9–17)
AST SERPL-CCNC: 23 U/L
BASOPHILS # BLD: 0 K/UL (ref 0–0.2)
BASOPHILS NFR BLD: 1 % (ref 0–2)
BILIRUB SERPL-MCNC: 0.5 MG/DL (ref 0.3–1.2)
BUN SERPL-MCNC: 14 MG/DL (ref 6–20)
CALCIUM SERPL-MCNC: 10.3 MG/DL (ref 8.6–10.4)
CHLORIDE SERPL-SCNC: 99 MMOL/L (ref 98–107)
CO2 SERPL-SCNC: 28 MMOL/L (ref 20–31)
CREAT SERPL-MCNC: 1 MG/DL (ref 0.5–0.9)
EOSINOPHIL # BLD: 0.3 K/UL (ref 0–0.4)
EOSINOPHILS RELATIVE PERCENT: 4 % (ref 1–4)
ERYTHROCYTE [DISTWIDTH] IN BLOOD BY AUTOMATED COUNT: 13.7 % (ref 12.5–15.4)
GFR, ESTIMATED: 65 ML/MIN/1.73M2
GLUCOSE SERPL-MCNC: 95 MG/DL (ref 70–99)
HCT VFR BLD AUTO: 46.5 % (ref 36–46)
HGB BLD-MCNC: 15.9 G/DL (ref 12–16)
LYMPHOCYTES NFR BLD: 2.3 K/UL (ref 1–4.8)
LYMPHOCYTES RELATIVE PERCENT: 33 % (ref 24–44)
MCH RBC QN AUTO: 31.7 PG (ref 26–34)
MCHC RBC AUTO-ENTMCNC: 34.3 G/DL (ref 31–37)
MCV RBC AUTO: 92.4 FL (ref 80–100)
MONOCYTES NFR BLD: 0.5 K/UL (ref 0.1–1.2)
MONOCYTES NFR BLD: 8 % (ref 2–11)
NEUTROPHILS NFR BLD: 54 % (ref 36–66)
NEUTS SEG NFR BLD: 3.8 K/UL (ref 1.8–7.7)
PLATELET # BLD AUTO: 216 K/UL (ref 140–450)
PMV BLD AUTO: 7.2 FL (ref 6–12)
POTASSIUM SERPL-SCNC: 4.4 MMOL/L (ref 3.7–5.3)
PROT SERPL-MCNC: 7.7 G/DL (ref 6.4–8.3)
RBC # BLD AUTO: 5.03 M/UL (ref 4–5.2)
SODIUM SERPL-SCNC: 138 MMOL/L (ref 135–144)
T3FREE SERPL-MCNC: 2.9 PG/ML (ref 2–4.4)
T4 FREE SERPL-MCNC: 1.5 NG/DL (ref 0.92–1.68)
TSH SERPL DL<=0.05 MIU/L-ACNC: 3.13 UIU/ML (ref 0.3–5)
WBC OTHER # BLD: 6.9 K/UL (ref 3.5–11)

## 2024-12-03 PROCEDURE — 3079F DIAST BP 80-89 MM HG: CPT | Performed by: INTERNAL MEDICINE

## 2024-12-03 PROCEDURE — G8484 FLU IMMUNIZE NO ADMIN: HCPCS | Performed by: INTERNAL MEDICINE

## 2024-12-03 PROCEDURE — 84481 FREE ASSAY (FT-3): CPT

## 2024-12-03 PROCEDURE — 3017F COLORECTAL CA SCREEN DOC REV: CPT | Performed by: INTERNAL MEDICINE

## 2024-12-03 PROCEDURE — 36415 COLL VENOUS BLD VENIPUNCTURE: CPT

## 2024-12-03 PROCEDURE — 85025 COMPLETE CBC W/AUTO DIFF WBC: CPT

## 2024-12-03 PROCEDURE — 99211 OFF/OP EST MAY X REQ PHY/QHP: CPT | Performed by: INTERNAL MEDICINE

## 2024-12-03 PROCEDURE — G8419 CALC BMI OUT NRM PARAM NOF/U: HCPCS | Performed by: INTERNAL MEDICINE

## 2024-12-03 PROCEDURE — 99214 OFFICE O/P EST MOD 30 MIN: CPT | Performed by: INTERNAL MEDICINE

## 2024-12-03 PROCEDURE — 84439 ASSAY OF FREE THYROXINE: CPT

## 2024-12-03 PROCEDURE — G8427 DOCREV CUR MEDS BY ELIG CLIN: HCPCS | Performed by: INTERNAL MEDICINE

## 2024-12-03 PROCEDURE — 1036F TOBACCO NON-USER: CPT | Performed by: INTERNAL MEDICINE

## 2024-12-03 PROCEDURE — 3077F SYST BP >= 140 MM HG: CPT | Performed by: INTERNAL MEDICINE

## 2024-12-03 PROCEDURE — 84443 ASSAY THYROID STIM HORMONE: CPT

## 2024-12-03 PROCEDURE — 80053 COMPREHEN METABOLIC PANEL: CPT

## 2024-12-03 RX ORDER — DM/P-EPHED/ACETAMINOPH/DOXYLAM
LIQUID (ML) ORAL
COMMUNITY

## 2024-12-03 NOTE — PROGRESS NOTES
DIAGNOSIS:   Severe thrombocytopenia, likely ITP with petechia and bruising, no active bleeding, steroid dependent  Hashimoto thyroiditis     CURRENT THERAPY:  Start steroids course (high dose dex)  07/31/2018 - good initial response  Transition to Rituxan, with excellent response, completed September 2018   Relapsed ITP in January 2022, retreatment with rituximab.      BRIEF CASE HISTORY:  Rola Schuster is a very pleasant 59 y.o. female who is referred to us for thrombocytopenia. She recently had CBC via PCP which showed PLT count at 5. She has no other history of lab work. She notes she has petechia ecchymosis which started about a year ago and has been bruising easily over the last weeks. She was on steroids for shoulder dislocation a few moths ago and had GI distress. She reports the bruising started after that.   She had hypothyroidism and takes Synthroid. She has not started new medication recently and does not take any blood thinners or pain medication.   She has no history of stroke, heart attack, previous blood disorders, diabetes, or cancer.   She does not drink alcohol or smoke cigarettes.   Started on steroids 07/31/2018 - good initial response. She was found to be steroid dependent, plan for Rituxan. Excellent response. She had persistent bone pain and bone marrow biopsy was done and was negative.   Her nephew was diagnosed with ITP 11/2019 at age 46.    she was later diagnosed with hashimoto thyroiditis.   The patient was monitored and off treatment for about 3 years but in 2022, her platelets were down to 28 and she required another treatment.  She was started again on Rituxan and steroids with good response. She underwent mammogram that was abnormal, Bx was done and showed inflammation, fibroadenoma and calcification.     INTERIM HISTORY: The patient presents for follow up with ITP surveillance. She reports she has been well but is having significant chest discomfort.  It is more of an

## 2024-12-03 NOTE — TELEPHONE ENCOUNTER
Instructions   from Dr. Karen Anthony MD    Refer to cardiology   Rv in 6 months with cbc, cmp    Referral sent to Cardiology. Patient given their number to call if she does not hear from them within 2-3 business days  RV 6/10/25 at 8:15 am with labs

## 2024-12-06 ENCOUNTER — TELEPHONE (OUTPATIENT)
Age: 59
End: 2024-12-06

## 2024-12-06 NOTE — TELEPHONE ENCOUNTER
Spoke with Sharmin from the cancer center about scheduling patient sooner than available Codie Dr. Aldo lima. I spoke with Susan CHADWICK and we opened up a spot on 12/18 with Dr. Carlson at 11:15a.     Sharmin stated that appt works and voiced understanding.

## 2024-12-18 ENCOUNTER — OFFICE VISIT (OUTPATIENT)
Age: 59
End: 2024-12-18
Payer: COMMERCIAL

## 2024-12-18 VITALS
HEART RATE: 63 BPM | OXYGEN SATURATION: 98 % | WEIGHT: 172.2 LBS | BODY MASS INDEX: 27.79 KG/M2 | SYSTOLIC BLOOD PRESSURE: 179 MMHG | DIASTOLIC BLOOD PRESSURE: 83 MMHG

## 2024-12-18 DIAGNOSIS — I10 ESSENTIAL HYPERTENSION: Primary | ICD-10-CM

## 2024-12-18 DIAGNOSIS — I20.9 ANGINA PECTORIS (HCC): ICD-10-CM

## 2024-12-18 PROCEDURE — 99204 OFFICE O/P NEW MOD 45 MIN: CPT | Performed by: INTERNAL MEDICINE

## 2024-12-18 PROCEDURE — 93000 ELECTROCARDIOGRAM COMPLETE: CPT | Performed by: INTERNAL MEDICINE

## 2024-12-18 PROCEDURE — 3079F DIAST BP 80-89 MM HG: CPT | Performed by: INTERNAL MEDICINE

## 2024-12-18 PROCEDURE — 3017F COLORECTAL CA SCREEN DOC REV: CPT | Performed by: INTERNAL MEDICINE

## 2024-12-18 PROCEDURE — 3077F SYST BP >= 140 MM HG: CPT | Performed by: INTERNAL MEDICINE

## 2024-12-18 PROCEDURE — G8427 DOCREV CUR MEDS BY ELIG CLIN: HCPCS | Performed by: INTERNAL MEDICINE

## 2024-12-18 PROCEDURE — G8419 CALC BMI OUT NRM PARAM NOF/U: HCPCS | Performed by: INTERNAL MEDICINE

## 2024-12-18 PROCEDURE — 1036F TOBACCO NON-USER: CPT | Performed by: INTERNAL MEDICINE

## 2024-12-18 PROCEDURE — G8484 FLU IMMUNIZE NO ADMIN: HCPCS | Performed by: INTERNAL MEDICINE

## 2024-12-18 NOTE — PROGRESS NOTES
Cardiology Office Consultation           CC: Patient is here to establish cardiac care for chest pain    HPI  Rola Schuster is here to establish cardiac care.  She has no prior cardiac history  She reports intermittent episodes of substernal chest discomfort described as achiness usually last few minutes, comes and goes, intermittent occasionally occurs twice a day, has been going on for last 4 months, no exertional component, denies any exertional dyspnea or decline in functional capacity.  Has significant history of ITP and following with , platelets are now stable    Past Medical:  Past Medical History:   Diagnosis Date    Acute ITP (HCC) 2018    Atrial fibrillation (HCC)     Hyperthyroidism     Wears dentures        Past Surgical:  Past Surgical History:   Procedure Laterality Date     SECTION      US BREAST BIOPSY W LOC DEVICE 1ST LESION LEFT Left 3/18/2022    US BREAST NEEDLE BIOPSY LEFT 3/18/2022 [facility]    US BREAST BIOPSY W LOC DEVICE EACH ADDL LESION LEFT Left 3/18/2022    US BREAST BIOPSY NEEDLE ADDITIONAL LEFT 3/18/2022 [facility]       Family History:  Family History   Problem Relation Age of Onset    Hypertension Mother     Thyroid Disease Mother     Colon Cancer Mother 70    Diabetes Father     Heart Attack Father     Breast Cancer Sister 63    High Blood Pressure Brother     Heart Disease Brother     Cancer Brother         liver cancer     Other Brother         MVA    Arthritis Brother     Depression Brother     Alcohol Abuse Brother     Colon Cancer Other 39        nephew - her brother's son       Social History:  Social History     Tobacco Use    Smoking status: Never    Smokeless tobacco: Never   Vaping Use    Vaping status: Never Used   Substance Use Topics    Alcohol use: Yes     Comment: Rare    Drug use: No        REVIEW OF SYSTEMS:    Constitutional: there has been no unanticipated weight loss. There's been No change in energy level, No

## 2025-01-06 ENCOUNTER — OFFICE VISIT (OUTPATIENT)
Dept: PRIMARY CARE CLINIC | Age: 60
End: 2025-01-06
Payer: COMMERCIAL

## 2025-01-06 VITALS
TEMPERATURE: 98 F | DIASTOLIC BLOOD PRESSURE: 86 MMHG | OXYGEN SATURATION: 99 % | HEART RATE: 76 BPM | WEIGHT: 170 LBS | SYSTOLIC BLOOD PRESSURE: 138 MMHG | BODY MASS INDEX: 27.44 KG/M2

## 2025-01-06 DIAGNOSIS — R05.1 ACUTE COUGH: Primary | ICD-10-CM

## 2025-01-06 DIAGNOSIS — R06.2 WHEEZING: ICD-10-CM

## 2025-01-06 PROCEDURE — G8427 DOCREV CUR MEDS BY ELIG CLIN: HCPCS

## 2025-01-06 PROCEDURE — 3017F COLORECTAL CA SCREEN DOC REV: CPT

## 2025-01-06 PROCEDURE — 99213 OFFICE O/P EST LOW 20 MIN: CPT

## 2025-01-06 PROCEDURE — G8419 CALC BMI OUT NRM PARAM NOF/U: HCPCS

## 2025-01-06 PROCEDURE — 3079F DIAST BP 80-89 MM HG: CPT

## 2025-01-06 PROCEDURE — M1308 PR FLU IMMUNIZE NO ADMIN: HCPCS

## 2025-01-06 PROCEDURE — 1036F TOBACCO NON-USER: CPT

## 2025-01-06 PROCEDURE — 3075F SYST BP GE 130 - 139MM HG: CPT

## 2025-01-06 RX ORDER — GUAIFENESIN 600 MG/1
600 TABLET, EXTENDED RELEASE ORAL 2 TIMES DAILY
Qty: 30 TABLET | Refills: 0 | Status: SHIPPED | OUTPATIENT
Start: 2025-01-06 | End: 2025-01-21

## 2025-01-06 RX ORDER — ASPIRIN 81 MG/1
81 TABLET ORAL DAILY
COMMUNITY

## 2025-01-06 RX ORDER — ALBUTEROL SULFATE 90 UG/1
2 INHALANT RESPIRATORY (INHALATION) 4 TIMES DAILY PRN
Qty: 54 G | Refills: 1 | Status: SHIPPED | OUTPATIENT
Start: 2025-01-06

## 2025-01-06 RX ORDER — AZITHROMYCIN 250 MG/1
TABLET, FILM COATED ORAL
Qty: 6 TABLET | Refills: 0 | Status: SHIPPED | OUTPATIENT
Start: 2025-01-06 | End: 2025-01-16

## 2025-01-06 RX ORDER — PREDNISONE 20 MG/1
20 TABLET ORAL DAILY
Qty: 5 TABLET | Refills: 0 | Status: SHIPPED | OUTPATIENT
Start: 2025-01-06 | End: 2025-01-06 | Stop reason: SINTOL

## 2025-01-06 ASSESSMENT — ENCOUNTER SYMPTOMS
SORE THROAT: 0
COUGH: 1
WHEEZING: 1
NAUSEA: 0
VOMITING: 0
SINUS PAIN: 1

## 2025-01-06 NOTE — PROGRESS NOTES
1 tablet by mouth daily for 5 days     Dispense:  5 tablet     Refill:  0    albuterol sulfate HFA (VENTOLIN HFA) 108 (90 Base) MCG/ACT inhaler     Sig: Inhale 2 puffs into the lungs 4 times daily as needed for Wheezing     Dispense:  54 g     Refill:  1    guaiFENesin (MUCINEX) 600 MG extended release tablet     Sig: Take 1 tablet by mouth 2 times daily for 15 days     Dispense:  30 tablet     Refill:  0             Patient and/or parent given educational materials - see patient instructions.  Discussed use, benefit, and side effects of prescribed medications.  All patient questions answered.  Patient and/or parent voiced understanding.      Labs in the last 30 days:     No visits with results within 1 Month(s) from this visit.   Latest known visit with results is:   Hospital Outpatient Visit on 12/03/2024   Component Date Value    T3, Free 12/03/2024 2.90     T4 Free 12/03/2024 1.5     TSH 12/03/2024 3.13       Electronically signed by JAYLENE Boyer 1/6/2025 at 9:56 AM

## 2025-01-26 DIAGNOSIS — Z86.2 HISTORY OF ITP: ICD-10-CM

## 2025-01-27 NOTE — TELEPHONE ENCOUNTER
LOV 8/6/2024   RTO 1 yr  LRF 10/22/24          Controlled Substance Monitoring:    Acute and Chronic Pain Monitoring:        No data to display

## 2025-01-29 ENCOUNTER — TELEPHONE (OUTPATIENT)
Age: 60
End: 2025-01-29

## 2025-01-29 NOTE — TELEPHONE ENCOUNTER
Pre call done, pt instucted to arrive at 0715 through entrance A, NPO after midnight, pt verbalizes understanding.

## 2025-01-30 ENCOUNTER — HOSPITAL ENCOUNTER (OUTPATIENT)
Dept: NUCLEAR MEDICINE | Age: 60
Discharge: HOME | End: 2025-02-01
Attending: INTERNAL MEDICINE
Payer: COMMERCIAL

## 2025-01-30 ENCOUNTER — HOSPITAL ENCOUNTER (OUTPATIENT)
Age: 60
Discharge: HOME | End: 2025-02-01
Attending: INTERNAL MEDICINE
Payer: COMMERCIAL

## 2025-01-30 VITALS — SYSTOLIC BLOOD PRESSURE: 152 MMHG | HEART RATE: 79 BPM | DIASTOLIC BLOOD PRESSURE: 81 MMHG

## 2025-01-30 DIAGNOSIS — I20.9 ANGINA PECTORIS (HCC): ICD-10-CM

## 2025-01-30 LAB
STRESS BASELINE DIAS BP: 81 MMHG
STRESS BASELINE HR: 64 BPM
STRESS BASELINE SYS BP: 148 MMHG
STRESS ESTIMATED WORKLOAD: 7 METS
STRESS EXERCISE DUR MIN: 5 MIN
STRESS EXERCISE DUR SEC: 56 SEC
STRESS PEAK DIAS BP: 88 MMHG
STRESS PEAK SYS BP: 207 MMHG
STRESS PERCENT HR ACHIEVED: 89 %
STRESS POST PEAK HR: 144 BPM
STRESS RATE PRESSURE PRODUCT: NORMAL BPM*MMHG
STRESS TARGET HR: 161 BPM

## 2025-01-30 PROCEDURE — 3430000000 HC RX DIAGNOSTIC RADIOPHARMACEUTICAL: Performed by: INTERNAL MEDICINE

## 2025-01-30 PROCEDURE — 2500000003 HC RX 250 WO HCPCS: Performed by: INTERNAL MEDICINE

## 2025-01-30 PROCEDURE — 78452 HT MUSCLE IMAGE SPECT MULT: CPT

## 2025-01-30 PROCEDURE — A9500 TC99M SESTAMIBI: HCPCS | Performed by: INTERNAL MEDICINE

## 2025-01-30 PROCEDURE — 93017 CV STRESS TEST TRACING ONLY: CPT

## 2025-01-30 RX ORDER — ATROPINE SULFATE 0.1 MG/ML
0.5 INJECTION INTRAVENOUS EVERY 5 MIN PRN
Status: DISCONTINUED | OUTPATIENT
Start: 2025-01-30 | End: 2025-01-30

## 2025-01-30 RX ORDER — SODIUM CHLORIDE 0.9 % (FLUSH) 0.9 %
10 SYRINGE (ML) INJECTION ONCE
Status: COMPLETED | OUTPATIENT
Start: 2025-01-30 | End: 2025-01-30

## 2025-01-30 RX ORDER — TETRAKIS(2-METHOXYISOBUTYLISOCYANIDE)COPPER(I) TETRAFLUOROBORATE 1 MG/ML
36 INJECTION, POWDER, LYOPHILIZED, FOR SOLUTION INTRAVENOUS
Status: COMPLETED | OUTPATIENT
Start: 2025-01-30 | End: 2025-01-30

## 2025-01-30 RX ORDER — METOPROLOL TARTRATE 1 MG/ML
5 INJECTION, SOLUTION INTRAVENOUS EVERY 5 MIN PRN
Status: DISCONTINUED | OUTPATIENT
Start: 2025-01-30 | End: 2025-01-30

## 2025-01-30 RX ORDER — SODIUM CHLORIDE 0.9 % (FLUSH) 0.9 %
5-40 SYRINGE (ML) INJECTION PRN
Status: DISCONTINUED | OUTPATIENT
Start: 2025-01-30 | End: 2025-01-30

## 2025-01-30 RX ORDER — SODIUM CHLORIDE 9 MG/ML
500 INJECTION, SOLUTION INTRAVENOUS CONTINUOUS PRN
Status: DISCONTINUED | OUTPATIENT
Start: 2025-01-30 | End: 2025-01-30

## 2025-01-30 RX ORDER — NITROGLYCERIN 0.4 MG/1
0.4 TABLET SUBLINGUAL EVERY 5 MIN PRN
Status: DISCONTINUED | OUTPATIENT
Start: 2025-01-30 | End: 2025-01-30

## 2025-01-30 RX ORDER — TETRAKIS(2-METHOXYISOBUTYLISOCYANIDE)COPPER(I) TETRAFLUOROBORATE 1 MG/ML
12 INJECTION, POWDER, LYOPHILIZED, FOR SOLUTION INTRAVENOUS
Status: COMPLETED | OUTPATIENT
Start: 2025-01-30 | End: 2025-01-30

## 2025-01-30 RX ORDER — ALBUTEROL SULFATE 90 UG/1
2 INHALANT RESPIRATORY (INHALATION) PRN
Status: DISCONTINUED | OUTPATIENT
Start: 2025-01-30 | End: 2025-01-30

## 2025-01-30 RX ADMIN — TETRAKIS(2-METHOXYISOBUTYLISOCYANIDE)COPPER(I) TETRAFLUOROBORATE 15.39 MILLICURIE: 1 INJECTION, POWDER, LYOPHILIZED, FOR SOLUTION INTRAVENOUS at 07:15

## 2025-01-30 RX ADMIN — SODIUM CHLORIDE, PRESERVATIVE FREE 10 ML: 5 INJECTION INTRAVENOUS at 07:15

## 2025-01-30 RX ADMIN — SODIUM CHLORIDE, PRESERVATIVE FREE 10 ML: 5 INJECTION INTRAVENOUS at 08:43

## 2025-01-30 RX ADMIN — TETRAKIS(2-METHOXYISOBUTYLISOCYANIDE)COPPER(I) TETRAFLUOROBORATE 35.99 MILLICURIE: 1 INJECTION, POWDER, LYOPHILIZED, FOR SOLUTION INTRAVENOUS at 08:51

## 2025-01-30 RX ADMIN — SODIUM CHLORIDE, PRESERVATIVE FREE 10 ML: 5 INJECTION INTRAVENOUS at 08:51

## 2025-01-30 NOTE — PROGRESS NOTES
Patient present for Cardio ite stress test. Educated on procedure. All questions/concerns addressed. Allergies and medication reviewed. Patient prepped for procedure. Stress test will be supervised by EDEL Dietrich.

## 2025-01-31 ENCOUNTER — TELEPHONE (OUTPATIENT)
Age: 60
End: 2025-01-31

## 2025-01-31 NOTE — TELEPHONE ENCOUNTER
Patient called back and was given normal test results    Also scheduled 6mo fup appt in 6/3 with Dr. Carlson    Patient voice understanding

## 2025-01-31 NOTE — TELEPHONE ENCOUNTER
Called and left voice mail for callback for nuclear stress results.    Waiting for patient callback

## 2025-04-07 ENCOUNTER — OFFICE VISIT (OUTPATIENT)
Dept: FAMILY MEDICINE CLINIC | Age: 60
End: 2025-04-07
Payer: COMMERCIAL

## 2025-04-07 VITALS
BODY MASS INDEX: 29.09 KG/M2 | RESPIRATION RATE: 16 BRPM | HEART RATE: 69 BPM | SYSTOLIC BLOOD PRESSURE: 122 MMHG | OXYGEN SATURATION: 98 % | DIASTOLIC BLOOD PRESSURE: 82 MMHG | WEIGHT: 180.2 LBS | TEMPERATURE: 97.9 F

## 2025-04-07 DIAGNOSIS — E06.3 HYPOTHYROIDISM DUE TO HASHIMOTO'S THYROIDITIS: ICD-10-CM

## 2025-04-07 DIAGNOSIS — J30.2 SEASONAL ALLERGIC RHINITIS, UNSPECIFIED TRIGGER: ICD-10-CM

## 2025-04-07 DIAGNOSIS — Z86.2 HISTORY OF ITP: ICD-10-CM

## 2025-04-07 DIAGNOSIS — Z12.31 ENCOUNTER FOR SCREENING MAMMOGRAM FOR BREAST CANCER: ICD-10-CM

## 2025-04-07 DIAGNOSIS — Z76.89 ENCOUNTER TO ESTABLISH CARE: Primary | ICD-10-CM

## 2025-04-07 DIAGNOSIS — M79.675 TOE PAIN, LEFT: ICD-10-CM

## 2025-04-07 DIAGNOSIS — I10 ESSENTIAL HYPERTENSION: ICD-10-CM

## 2025-04-07 DIAGNOSIS — M79.622 LEFT UPPER ARM PAIN: ICD-10-CM

## 2025-04-07 PROCEDURE — 99214 OFFICE O/P EST MOD 30 MIN: CPT

## 2025-04-07 PROCEDURE — G8419 CALC BMI OUT NRM PARAM NOF/U: HCPCS

## 2025-04-07 PROCEDURE — 3079F DIAST BP 80-89 MM HG: CPT

## 2025-04-07 PROCEDURE — G8427 DOCREV CUR MEDS BY ELIG CLIN: HCPCS

## 2025-04-07 PROCEDURE — 3017F COLORECTAL CA SCREEN DOC REV: CPT

## 2025-04-07 PROCEDURE — 1036F TOBACCO NON-USER: CPT

## 2025-04-07 PROCEDURE — 3074F SYST BP LT 130 MM HG: CPT

## 2025-04-07 RX ORDER — MAGNESIUM GLYCINATE 100 MG
400 TABLET ORAL NIGHTLY
COMMUNITY

## 2025-04-07 ASSESSMENT — PATIENT HEALTH QUESTIONNAIRE - PHQ9
1. LITTLE INTEREST OR PLEASURE IN DOING THINGS: NOT AT ALL
SUM OF ALL RESPONSES TO PHQ QUESTIONS 1-9: 0
SUM OF ALL RESPONSES TO PHQ QUESTIONS 1-9: 0
2. FEELING DOWN, DEPRESSED OR HOPELESS: NOT AT ALL
SUM OF ALL RESPONSES TO PHQ QUESTIONS 1-9: 0
SUM OF ALL RESPONSES TO PHQ QUESTIONS 1-9: 0

## 2025-04-08 ENCOUNTER — HOSPITAL ENCOUNTER (OUTPATIENT)
Dept: GENERAL RADIOLOGY | Age: 60
Discharge: HOME | End: 2025-04-10
Payer: COMMERCIAL

## 2025-04-08 ENCOUNTER — HOSPITAL ENCOUNTER (OUTPATIENT)
Age: 60
Discharge: HOME | End: 2025-04-10
Payer: COMMERCIAL

## 2025-04-08 DIAGNOSIS — M79.622 LEFT UPPER ARM PAIN: ICD-10-CM

## 2025-04-08 DIAGNOSIS — M79.675 TOE PAIN, LEFT: ICD-10-CM

## 2025-04-08 PROCEDURE — 73630 X-RAY EXAM OF FOOT: CPT

## 2025-04-08 PROCEDURE — 73060 X-RAY EXAM OF HUMERUS: CPT

## 2025-04-11 ENCOUNTER — RESULTS FOLLOW-UP (OUTPATIENT)
Dept: FAMILY MEDICINE CLINIC | Age: 60
End: 2025-04-11

## 2025-06-03 ENCOUNTER — OFFICE VISIT (OUTPATIENT)
Age: 60
End: 2025-06-03
Payer: COMMERCIAL

## 2025-06-03 VITALS
WEIGHT: 179 LBS | OXYGEN SATURATION: 100 % | SYSTOLIC BLOOD PRESSURE: 154 MMHG | DIASTOLIC BLOOD PRESSURE: 83 MMHG | BODY MASS INDEX: 28.77 KG/M2 | HEIGHT: 66 IN | HEART RATE: 68 BPM

## 2025-06-03 DIAGNOSIS — I10 ESSENTIAL HYPERTENSION: Primary | ICD-10-CM

## 2025-06-03 PROCEDURE — G8419 CALC BMI OUT NRM PARAM NOF/U: HCPCS | Performed by: INTERNAL MEDICINE

## 2025-06-03 PROCEDURE — 93000 ELECTROCARDIOGRAM COMPLETE: CPT | Performed by: INTERNAL MEDICINE

## 2025-06-03 PROCEDURE — 3017F COLORECTAL CA SCREEN DOC REV: CPT | Performed by: INTERNAL MEDICINE

## 2025-06-03 PROCEDURE — G8427 DOCREV CUR MEDS BY ELIG CLIN: HCPCS | Performed by: INTERNAL MEDICINE

## 2025-06-03 PROCEDURE — 99213 OFFICE O/P EST LOW 20 MIN: CPT | Performed by: INTERNAL MEDICINE

## 2025-06-03 PROCEDURE — 3077F SYST BP >= 140 MM HG: CPT | Performed by: INTERNAL MEDICINE

## 2025-06-03 PROCEDURE — 1036F TOBACCO NON-USER: CPT | Performed by: INTERNAL MEDICINE

## 2025-06-03 PROCEDURE — 3079F DIAST BP 80-89 MM HG: CPT | Performed by: INTERNAL MEDICINE

## 2025-06-03 NOTE — PROGRESS NOTES
Cardiology Office follow up            CC: Patient is here for three months follow up     HPI  Rola Schuster is here for follow up   She underwent treadmill Cardiolite stress test which was low  Denies any further episode of chest pain after she had stopped antihypertensive  Blood pressure is controlled at home, mildly elevated in office which she attributes towards whitecoat hypertension  Otherwise stable with no dyspnea, orthopnea or leg    Past Medical:  Past Medical History:   Diagnosis Date    Acute ITP (HCC) 2018    Atrial fibrillation (HCC)     Hyperthyroidism     Wears dentures        Past Surgical:  Past Surgical History:   Procedure Laterality Date     SECTION      US BREAST BIOPSY W LOC DEVICE 1ST LESION LEFT Left 3/18/2022    US BREAST NEEDLE BIOPSY LEFT 3/18/2022 [facility]    US BREAST BIOPSY W LOC DEVICE EACH ADDL LESION LEFT Left 3/18/2022    US BREAST BIOPSY NEEDLE ADDITIONAL LEFT 3/18/2022 [facility]       Family History:  Family History   Problem Relation Age of Onset    Hypertension Mother     Thyroid Disease Mother     Colon Cancer Mother 70    Diabetes Father     Heart Attack Father     Breast Cancer Sister 63    High Blood Pressure Brother     Heart Disease Brother     Cancer Brother         liver cancer     Other Brother         MVA    Arthritis Brother     Depression Brother     Alcohol Abuse Brother     Colon Cancer Other 39        nephew - her brother's son       Social History:  Social History     Tobacco Use    Smoking status: Never    Smokeless tobacco: Never   Vaping Use    Vaping status: Never Used   Substance Use Topics    Alcohol use: Yes     Comment: Rare    Drug use: No        REVIEW OF SYSTEMS:    Constitutional: there has been no unanticipated weight loss. There's been No change in energy level, No change in activity level.     Eyes: No visual changes or diplopia. No scleral icterus.  ENT: No Headaches, hearing loss or vertigo. No mouth

## 2025-06-10 ENCOUNTER — TELEPHONE (OUTPATIENT)
Age: 60
End: 2025-06-10

## 2025-06-10 ENCOUNTER — OFFICE VISIT (OUTPATIENT)
Age: 60
End: 2025-06-10
Payer: COMMERCIAL

## 2025-06-10 ENCOUNTER — HOSPITAL ENCOUNTER (OUTPATIENT)
Age: 60
Discharge: HOME | End: 2025-06-10
Payer: COMMERCIAL

## 2025-06-10 VITALS
OXYGEN SATURATION: 100 % | DIASTOLIC BLOOD PRESSURE: 85 MMHG | SYSTOLIC BLOOD PRESSURE: 161 MMHG | BODY MASS INDEX: 28.88 KG/M2 | HEART RATE: 61 BPM | TEMPERATURE: 97.5 F | WEIGHT: 178.9 LBS

## 2025-06-10 DIAGNOSIS — Z86.2 HISTORY OF ITP: ICD-10-CM

## 2025-06-10 DIAGNOSIS — E06.3 HYPOTHYROIDISM DUE TO HASHIMOTO'S THYROIDITIS: Primary | ICD-10-CM

## 2025-06-10 PROBLEM — D69.3 ACUTE ITP (HCC): Status: RESOLVED | Noted: 2018-07-31 | Resolved: 2025-06-10

## 2025-06-10 LAB
ALBUMIN SERPL-MCNC: 4.5 G/DL (ref 3.5–5.2)
ALBUMIN/GLOB SERPL: 1.7 {RATIO} (ref 1–2.5)
ALP SERPL-CCNC: 81 U/L (ref 35–104)
ALT SERPL-CCNC: 25 U/L (ref 10–35)
ANION GAP SERPL CALCULATED.3IONS-SCNC: 13 MMOL/L (ref 9–16)
AST SERPL-CCNC: 29 U/L (ref 10–35)
BASOPHILS # BLD: 0 K/UL (ref 0–0.2)
BASOPHILS NFR BLD: 1 % (ref 0–2)
BILIRUB SERPL-MCNC: 0.5 MG/DL (ref 0–1.2)
BUN SERPL-MCNC: 12 MG/DL (ref 6–20)
CALCIUM SERPL-MCNC: 9.6 MG/DL (ref 8.6–10.4)
CHLORIDE SERPL-SCNC: 104 MMOL/L (ref 98–107)
CO2 SERPL-SCNC: 23 MMOL/L (ref 20–31)
CREAT SERPL-MCNC: 0.9 MG/DL (ref 0.6–0.9)
EOSINOPHIL # BLD: 0.3 K/UL (ref 0–0.4)
EOSINOPHILS RELATIVE PERCENT: 6 % (ref 1–4)
ERYTHROCYTE [DISTWIDTH] IN BLOOD BY AUTOMATED COUNT: 13.9 % (ref 12.5–15.4)
GFR, ESTIMATED: 74 ML/MIN/1.73M2
GLUCOSE SERPL-MCNC: 89 MG/DL (ref 74–99)
HCT VFR BLD AUTO: 44.6 % (ref 36–46)
HGB BLD-MCNC: 14.7 G/DL (ref 12–16)
LYMPHOCYTES NFR BLD: 1.7 K/UL (ref 1–4.8)
LYMPHOCYTES RELATIVE PERCENT: 35 % (ref 24–44)
MCH RBC QN AUTO: 30.6 PG (ref 26–34)
MCHC RBC AUTO-ENTMCNC: 33 G/DL (ref 31–37)
MCV RBC AUTO: 92.6 FL (ref 80–100)
MONOCYTES NFR BLD: 0.4 K/UL (ref 0.1–1.2)
MONOCYTES NFR BLD: 9 % (ref 2–11)
NEUTROPHILS NFR BLD: 49 % (ref 36–66)
NEUTS SEG NFR BLD: 2.5 K/UL (ref 1.8–7.7)
PLATELET # BLD AUTO: 186 K/UL (ref 140–450)
PMV BLD AUTO: 7.8 FL (ref 6–12)
POTASSIUM SERPL-SCNC: 4.3 MMOL/L (ref 3.7–5.3)
PROT SERPL-MCNC: 7.2 G/DL (ref 6.6–8.7)
RBC # BLD AUTO: 4.81 M/UL (ref 4–5.2)
SODIUM SERPL-SCNC: 140 MMOL/L (ref 136–145)
WBC OTHER # BLD: 5 K/UL (ref 3.5–11)

## 2025-06-10 PROCEDURE — 1036F TOBACCO NON-USER: CPT | Performed by: INTERNAL MEDICINE

## 2025-06-10 PROCEDURE — 3017F COLORECTAL CA SCREEN DOC REV: CPT | Performed by: INTERNAL MEDICINE

## 2025-06-10 PROCEDURE — 99202 OFFICE O/P NEW SF 15 MIN: CPT | Performed by: INTERNAL MEDICINE

## 2025-06-10 PROCEDURE — 36415 COLL VENOUS BLD VENIPUNCTURE: CPT

## 2025-06-10 PROCEDURE — 99213 OFFICE O/P EST LOW 20 MIN: CPT | Performed by: INTERNAL MEDICINE

## 2025-06-10 PROCEDURE — 3077F SYST BP >= 140 MM HG: CPT | Performed by: INTERNAL MEDICINE

## 2025-06-10 PROCEDURE — 85025 COMPLETE CBC W/AUTO DIFF WBC: CPT

## 2025-06-10 PROCEDURE — G8419 CALC BMI OUT NRM PARAM NOF/U: HCPCS | Performed by: INTERNAL MEDICINE

## 2025-06-10 PROCEDURE — G8427 DOCREV CUR MEDS BY ELIG CLIN: HCPCS | Performed by: INTERNAL MEDICINE

## 2025-06-10 PROCEDURE — 99214 OFFICE O/P EST MOD 30 MIN: CPT | Performed by: INTERNAL MEDICINE

## 2025-06-10 PROCEDURE — 3079F DIAST BP 80-89 MM HG: CPT | Performed by: INTERNAL MEDICINE

## 2025-06-10 PROCEDURE — 80053 COMPREHEN METABOLIC PANEL: CPT

## 2025-06-10 NOTE — PROGRESS NOTES
biopsy negative     PLAN:   Patient continues to do well and she is in remission  This is have been stable for extended period of time  Will continue to monitor platelets closely and intervene as needed.  She is asked to get her follow-up mammogram which was ordered by primary care  We reviewed the cardiology note and workup.  Cardiac workup is essentially negative.  I asked her to stop taking the low-dose aspirin.  Monitor blood pressure at home and report to us and to cardiology and primary team         Karen Anthony MD  Hematologist/Medical Oncologist  [facility]

## 2025-06-10 NOTE — TELEPHONE ENCOUNTER
Instructions   from Dr. Karen Nicholson MD    Rv in 6 months with cbc , cmp         RV 12/9/25 at 8 am with cbc, cmp